# Patient Record
Sex: FEMALE | Race: WHITE | NOT HISPANIC OR LATINO | Employment: FULL TIME | URBAN - METROPOLITAN AREA
[De-identification: names, ages, dates, MRNs, and addresses within clinical notes are randomized per-mention and may not be internally consistent; named-entity substitution may affect disease eponyms.]

---

## 2017-12-13 ENCOUNTER — OFFICE VISIT (OUTPATIENT)
Dept: FAMILY MEDICINE | Facility: CLINIC | Age: 52
End: 2017-12-13
Payer: COMMERCIAL

## 2017-12-13 ENCOUNTER — TELEPHONE (OUTPATIENT)
Dept: FAMILY MEDICINE | Facility: CLINIC | Age: 52
End: 2017-12-13

## 2017-12-13 VITALS
DIASTOLIC BLOOD PRESSURE: 70 MMHG | BODY MASS INDEX: 28.28 KG/M2 | HEART RATE: 93 BPM | HEIGHT: 63 IN | TEMPERATURE: 98 F | OXYGEN SATURATION: 97 % | WEIGHT: 159.6 LBS | SYSTOLIC BLOOD PRESSURE: 120 MMHG

## 2017-12-13 DIAGNOSIS — Z12.11 SCREEN FOR COLON CANCER: ICD-10-CM

## 2017-12-13 DIAGNOSIS — L29.9 ITCHING: ICD-10-CM

## 2017-12-13 DIAGNOSIS — Z23 NEED FOR PROPHYLACTIC VACCINATION AND INOCULATION AGAINST INFLUENZA: ICD-10-CM

## 2017-12-13 DIAGNOSIS — Z00.00 ROUTINE GENERAL MEDICAL EXAMINATION AT A HEALTH CARE FACILITY: Primary | ICD-10-CM

## 2017-12-13 DIAGNOSIS — I10 HYPERTENSION GOAL BP (BLOOD PRESSURE) < 140/90: ICD-10-CM

## 2017-12-13 LAB
ANION GAP SERPL CALCULATED.3IONS-SCNC: 8 MMOL/L (ref 3–14)
BUN SERPL-MCNC: 13 MG/DL (ref 7–30)
CALCIUM SERPL-MCNC: 9.2 MG/DL (ref 8.5–10.1)
CHLORIDE SERPL-SCNC: 106 MMOL/L (ref 94–109)
CHOLEST SERPL-MCNC: 173 MG/DL
CO2 SERPL-SCNC: 26 MMOL/L (ref 20–32)
CREAT SERPL-MCNC: 0.92 MG/DL (ref 0.52–1.04)
GFR SERPL CREATININE-BSD FRML MDRD: 63 ML/MIN/1.7M2
GLUCOSE SERPL-MCNC: 90 MG/DL (ref 70–99)
HDLC SERPL-MCNC: 63 MG/DL
LDLC SERPL CALC-MCNC: 95 MG/DL
NONHDLC SERPL-MCNC: 110 MG/DL
POTASSIUM SERPL-SCNC: 3.9 MMOL/L (ref 3.4–5.3)
SODIUM SERPL-SCNC: 140 MMOL/L (ref 133–144)
TRIGL SERPL-MCNC: 74 MG/DL

## 2017-12-13 PROCEDURE — 36415 COLL VENOUS BLD VENIPUNCTURE: CPT | Performed by: FAMILY MEDICINE

## 2017-12-13 PROCEDURE — 90471 IMMUNIZATION ADMIN: CPT | Performed by: FAMILY MEDICINE

## 2017-12-13 PROCEDURE — 80048 BASIC METABOLIC PNL TOTAL CA: CPT | Performed by: FAMILY MEDICINE

## 2017-12-13 PROCEDURE — 90686 IIV4 VACC NO PRSV 0.5 ML IM: CPT | Performed by: FAMILY MEDICINE

## 2017-12-13 PROCEDURE — 99396 PREV VISIT EST AGE 40-64: CPT | Mod: 25 | Performed by: FAMILY MEDICINE

## 2017-12-13 PROCEDURE — 80061 LIPID PANEL: CPT | Performed by: FAMILY MEDICINE

## 2017-12-13 RX ORDER — LOSARTAN POTASSIUM 100 MG/1
100 TABLET ORAL DAILY
Qty: 90 TABLET | Refills: 3 | Status: SHIPPED | OUTPATIENT
Start: 2017-12-13 | End: 2018-12-05

## 2017-12-13 RX ORDER — TRIAMCINOLONE ACETONIDE 1 MG/G
CREAM TOPICAL 2 TIMES DAILY
COMMUNITY
End: 2017-12-13

## 2017-12-13 RX ORDER — TRIAMCINOLONE ACETONIDE 1 MG/G
CREAM TOPICAL 2 TIMES DAILY
Qty: 30 G | Refills: 1 | Status: SHIPPED | OUTPATIENT
Start: 2017-12-13 | End: 2018-12-17

## 2017-12-13 NOTE — NURSING NOTE
"Chief Complaint   Patient presents with     Physical     Currently, fasting        Initial BP (!) 140/92 (BP Location: Left arm, Patient Position: Chair, Cuff Size: Adult Regular)  Pulse 93  Temp 98  F (36.7  C) (Tympanic)  Ht 5' 3\" (1.6 m)  Wt 159 lb 9.6 oz (72.4 kg)  SpO2 97%  Breastfeeding? No  BMI 28.27 kg/m2 Estimated body mass index is 28.27 kg/(m^2) as calculated from the following:    Height as of this encounter: 5' 3\" (1.6 m).    Weight as of this encounter: 159 lb 9.6 oz (72.4 kg).  Medication Reconciliation: complete     Rochelle Cam MA     "

## 2017-12-13 NOTE — TELEPHONE ENCOUNTER
[employer] Screening form faxed to [phone]  Original /confirmation mailed to patient  Copy sent to stat abstracting  Althea Rosas TC

## 2017-12-13 NOTE — MR AVS SNAPSHOT
After Visit Summary   12/13/2017    Kristen Bernstein    MRN: [id number]           Patient Information     Date Of Birth          1965        Visit Information        Provider Department      12/13/2017 9:00 AM Evelin Soto MD [facility]        Today's Diagnoses     Routine general medical examination at a health care facility    -  1    Screen for colon cancer        Need for prophylactic vaccination and inoculation against influenza        Hypertension goal BP (blood pressure) < 140/90        Itching          Care Instructions      Preventive Health Recommendations  Female Ages 50 - 64    Yearly exam: See your health care provider every year in order to  o Review health changes.   o Discuss preventive care.    o Review your medicines if your doctor has prescribed any.      Get a Pap test every three years (unless you have an abnormal result and your provider advises testing more often).    If you get Pap tests with HPV test, you only need to test every 5 years, unless you have an abnormal result.     You do not need a Pap test if your uterus was removed (hysterectomy) and you have not had cancer.    You should be tested each year for STDs (sexually transmitted diseases) if you're at risk.     Have a mammogram every 1 to 2 years.    Have a colonoscopy at age 50, or have a yearly FIT test (stool test). These exams screen for colon cancer.      Have a cholesterol test every 5 years, or more often if advised.    Have a diabetes test (fasting glucose) every three years. If you are at risk for diabetes, you should have this test more often.     If you are at risk for osteoporosis (brittle bone disease), think about having a bone density scan (DEXA).    Shots: Get a flu shot each year. Get a tetanus shot every 10 years.    Nutrition:     Eat at least 5 servings of fruits and vegetables each day.    Eat whole-grain bread, whole-wheat pasta and brown rice instead of white grains and  rice.    Talk to your provider about Calcium and Vitamin D.     Lifestyle    Exercise at least 150 minutes a week (30 minutes a day, 5 days a week). This will help you control your weight and prevent disease.    Limit alcohol to one drink per day.    No smoking.     Wear sunscreen to prevent skin cancer.     See your dentist every six months for an exam and cleaning.    See your eye doctor every 1 to 2 years.            Follow-ups after your visit        Additional Services     GASTROENTEROLOGY ADULT REF PROCEDURE ONLY       Last Lab Result: Creatinine (mg/dL)       Date                     Value                 11/22/2016               0.95             ----------  Body mass index is 28.27 kg/(m^2).      Patient will be contacted to schedule procedure.     Please be aware that coverage of these services is subject to the terms and limitations of your health insurance plan.  Call member services at your health plan with any benefit or coverage questions.  Any procedures must be performed at a [facility] facility OR coordinated by your clinic's referral office.    Please bring the following with you to your appointment:    (1) Any X-Rays, CTs or MRIs which have been performed.  Contact the facility where they were done to arrange for  prior to your scheduled appointment.    (2) List of current medications   (3) This referral request   (4) Any documents/labs given to you for this referral                  Follow-up notes from your care team     Return in about 1 year (around 12/13/2018) for Annual Physical Exam.      Who to contact     If you have questions or need follow up information about today's clinic visit or your schedule please contact [facility] [place] directly at [phone].  Normal or non-critical lab and imaging results will be communicated to you by MyChart, letter or phone within 4 business days after the clinic has received the results. If you do not hear from us within 7 days, please  "contact the clinic through [email or web address] or phone. If you have a critical or abnormal lab result, we will notify you by phone as soon as possible.  Submit refill requests through [email or web address] or call your pharmacy and they will forward the refill request to us. Please allow 3 business days for your refill to be completed.          Additional Information About Your Visit        [employer] Information     [email or web address] gives you secure access to your electronic health record. If you see a primary care provider, you can also send messages to your care team and make appointments. If you have questions, please call your primary care clinic.  If you do not have a primary care provider, please call [phone] and they will assist you.        Care EveryWhere ID     This is your Care EveryWhere ID. This could be used by other organizations to access your [place] medical records  [id number]        Your Vitals Were     Pulse Temperature Height Pulse Oximetry Breastfeeding? BMI (Body Mass Index)    93 98  F (36.7  C) (Tympanic) 5' 3\" (1.6 m) 97% No 28.27 kg/m2       Blood Pressure from Last 3 Encounters:   12/13/17 120/70   11/22/16 136/86   08/25/16 130/87    Weight from Last 3 Encounters:   12/13/17 159 lb 9.6 oz (72.4 kg)   11/22/16 155 lb (70.3 kg)   08/25/16 158 lb (71.7 kg)              We Performed the Following     Basic metabolic panel     FLU VAC, SPLIT VIRUS IM > 3 YO (QUADRIVALENT) [75934]     GASTROENTEROLOGY ADULT REF PROCEDURE ONLY     Lipid Profile     Vaccine Administration, Initial [31761]          Where to get your medicines      These medications were sent to [id number] - [place] - [address]  [address] [address]     Phone:  [phone]     losartan 100 MG tablet    triamcinolone 0.1 % cream          Primary Care Provider Office Phone # Fax #    Evelin Soto [phone]       [address]  [place] [address]        Equal Access to Services     " MAO MARX : Hadii aad ku codi Dalal, waaxda luqadaha, qaybta kaalmada abimbola, vika jeramie parmjitkarey keyesendermarcelino shaw . So [facility] [phone].    ATENCIÓN: Si habla español, tiene a tyson disposición servicios gratuitos de asistencia lingüística. Llame al [phone].    We comply with applicable federal civil rights laws and [place] laws. We do not discriminate on the basis of race, color, national origin, age, disability, sex, sexual orientation, or gender identity.            Thank you!     Thank you for choosing [facility] [place]  for your care. Our goal is always to provide you with excellent care. Hearing back from our patients is one way we can continue to improve our services. Please take a few minutes to complete the written survey that you may receive in the mail after your visit with us. Thank you!             Your Updated Medication List - Protect others around you: Learn how to safely use, store and throw away your medicines at www.disposemymeds.org.          This list is accurate as of: 12/13/17  9:39 AM.  Always use your most recent med list.                   Brand Name Dispense Instructions for use Diagnosis    losartan 100 MG tablet    COZAAR    90 tablet    Take 1 tablet (100 mg) by mouth daily    Hypertension goal BP (blood pressure) < 140/90       nystatin 411100 UNIT/GM Powd    MYCOSTATIN    60 g    Apply topically 2 times daily as needed (itching)    Candidal skin infection       omeprazole 20 MG CR capsule    priLOSEC    90 capsule    Take 1 capsule by mouth daily.    GERD (gastroesophageal reflux disease)       triamcinolone 0.1 % cream    KENALOG    30 g    Apply topically 2 times daily    Itching

## 2017-12-13 NOTE — PROGRESS NOTES
SUBJECTIVE:   CC: Kristen Bernstein is an 52 year old woman who presents for preventive health visit.     Healthy Habits:    Do you get at least three servings of calcium containing foods daily (dairy, green leafy vegetables, etc.)? yes    Amount of exercise or daily activities, outside of work: 0 hour(s) per day    Problems taking medications regularly No    Medication side effects: No    Have you had an eye exam in the past two years? Unsure     Do you see a dentist twice per year? yes  Do you have sleep apnea, excessive snoring or daytime drowsiness?no      Hypertension Follow-up      Outpatient blood pressures are not being checked.    Low Salt Diet: no added salt          Today's PHQ-2 Score:   PHQ-2 ( 1999 Pfizer) 11/22/2016 11/19/2016   Q1: Little interest or pleasure in doing things 0 -   Q2: Feeling down, depressed or hopeless 0 -   PHQ-2 Score 0 -   Q1: Little interest or pleasure in doing things - Not at all   Q2: Feeling down, depressed or hopeless - Not at all   PHQ-2 Score - 0         Abuse: Current or Past(Physical, Sexual or Emotional)- No  Do you feel safe in your environment - Yes  Social History   Substance Use Topics     Smoking status: Never Smoker     Smokeless tobacco: Never Used     Alcohol use 0.0 oz/week     0 Standard drinks or equivalent per week      Comment: occ.      The patient does not drink >3 drinks per day nor >7 drinks per week.    Reviewed orders with patient.  Reviewed health maintenance and updated orders accordingly - Yes  Labs reviewed in EPIC  BP Readings from Last 3 Encounters:   12/13/17 120/70   11/22/16 136/86   08/25/16 130/87    Wt Readings from Last 3 Encounters:   12/13/17 159 lb 9.6 oz (72.4 kg)   11/22/16 155 lb (70.3 kg)   08/25/16 158 lb (71.7 kg)                  Patient Active Problem List   Diagnosis     Hypertension goal BP (blood pressure) < 140/90     Family history of colon cancer     GERD (gastroesophageal reflux disease)     CARDIOVASCULAR SCREENING;  LDL GOAL LESS THAN 130     Past Surgical History:   Procedure Laterality Date     C C-SEC ONLY,PREV C-SEC       COLONOSCOPY  10/12/2012    Procedure: COLONOSCOPY;  Colonoscopy, screening;  Surgeon: Kristen Alexander MD;  Location: [facility] OR      COLONOSCOPY THRU STOMA, DIAGNOSTIC  2007    polyps removed.      UPPER GI ENDOSCOPY  3/2006    [facility]       Social History   Substance Use Topics     Smoking status: Never Smoker     Smokeless tobacco: Never Used     Alcohol use 0.0 oz/week     0 Standard drinks or equivalent per week      Comment: occ.      Family History   Problem Relation Age of Onset     DIABETES Father      Hypertension Father      Colon Cancer Father      Hypertension Maternal Grandmother      Heart Failure Maternal Grandmother      DIABETES Maternal Uncle      Lung Cancer Maternal Uncle          Current Outpatient Prescriptions   Medication Sig Dispense Refill     losartan (COZAAR) 100 MG tablet Take 1 tablet (100 mg) by mouth daily 90 tablet 3     triamcinolone (KENALOG) 0.1 % cream Apply topically 2 times daily 30 g 1     nystatin (MYCOSTATIN) 326297 UNIT/GM POWD Apply topically 2 times daily as needed (itching) 60 g 1     omeprazole (PRILOSEC) 20 MG capsule Take 1 capsule by mouth daily. 90 capsule 3     [DISCONTINUED] losartan (COZAAR) 100 MG tablet Take 1 tablet (100 mg) by mouth daily 90 tablet 3     No Known Allergies        Mammogram : Will schedule a mammogram for next year     Colonoscopy : Will schedule     Pertinent mammograms are reviewed under the imaging tab.  History of abnormal Pap smear: No    Reviewed and updated as needed this visit by clinical staff  Tobacco  Allergies  Meds  Med Hx  Surg Hx  Fam Hx  Soc Hx        Reviewed and updated as needed this visit by Provider  Allergies              ROS:  C: NEGATIVE for fever, chills, change in weight  I: NEGATIVE for worrisome rashes, moles or lesions  E: NEGATIVE for vision changes or irritation  ENT: NEGATIVE for ear,  "mouth and throat problems  R: NEGATIVE for significant cough or SOB  B: NEGATIVE for masses, tenderness or discharge  CV: NEGATIVE for chest pain, palpitations or peripheral edema  GI: NEGATIVE for nausea, abdominal pain, heartburn, or change in bowel habits  : NEGATIVE for unusual urinary or vaginal symptoms. Periods are regular.  M: NEGATIVE for significant arthralgias or myalgia  N: NEGATIVE for weakness, dizziness or paresthesias  P: NEGATIVE for changes in mood or affect    OBJECTIVE:   /70 (BP Location: Left arm, Patient Position: Chair, Cuff Size: Adult Regular)  Pulse 93  Temp 98  F (36.7  C) (Tympanic)  Ht 5' 3\" (1.6 m)  Wt 159 lb 9.6 oz (72.4 kg)  SpO2 97%  Breastfeeding? No  BMI 28.27 kg/m2  EXAM:  GENERAL: healthy, alert and no distress  EYES: Eyes grossly normal to inspection, PERRL and conjunctivae and sclerae normal  HENT: ear canals and TM's normal, nose and mouth without ulcers or lesions  NECK: no adenopathy, no asymmetry, masses, or scars and thyroid normal to palpation  RESP: lungs clear to auscultation - no rales, rhonchi or wheezes  BREAST: normal without masses, tenderness or nipple discharge and no palpable axillary masses or adenopathy  CV: regular rate and rhythm, normal S1 S2, no S3 or S4, no murmur, click or rub, no peripheral edema and peripheral pulses strong  ABDOMEN: soft, nontender, no hepatosplenomegaly, no masses and bowel sounds normal  MS: no gross musculoskeletal defects noted, no edema  SKIN: no suspicious lesions or rashes  NEURO: Normal strength and tone, mentation intact and speech normal  PSYCH: mentation appears normal, affect normal/bright    ASSESSMENT/PLAN:   1. Routine general medical examination at a health care facility  Screening labs ordered.  - Lipid Profile    2. Hypertension goal BP (blood pressure) < 140/90  Well controlled. Resume Cozaar.  - losartan (COZAAR) 100 MG tablet; Take 1 tablet (100 mg) by mouth daily  Dispense: 90 tablet; Refill: 3  - " "Basic metabolic panel    3. Itching  No active lesions noted. Refill on Kenalog given for itch on the neck.  - triamcinolone (KENALOG) 0.1 % cream; Apply topically 2 times daily  Dispense: 30 g; Refill: 1    4. Screen for colon cancer  Family history of colon cancer in father. Colonoscopy ordered  - GASTROENTEROLOGY ADULT REF PROCEDURE ONLY    5. Need for prophylactic vaccination and inoculation against influenza      - FLU VAC, SPLIT VIRUS IM > 3 YO (QUADRIVALENT) [18526]  - Vaccine Administration, Initial [30845]    COUNSELING:   Reviewed preventive health counseling, as reflected in patient instructions       Regular exercise       Healthy diet/nutrition         reports that she has never smoked. She has never used smokeless tobacco.    Estimated body mass index is 28.27 kg/(m^2) as calculated from the following:    Height as of this encounter: 5' 3\" (1.6 m).    Weight as of this encounter: 159 lb 9.6 oz (72.4 kg).   Weight management plan: Discussed healthy diet and exercise guidelines and patient will follow up in 12 months in clinic to re-evaluate.    Counseling Resources:  ATP IV Guidelines  Pooled Cohorts Equation Calculator  Breast Cancer Risk Calculator  FRAX Risk Assessment  ICSI Preventive Guidelines  Dietary Guidelines for Americans, 2010  USDA's MyPlate  ASA Prophylaxis  Lung CA Screening    Evelin Soto MD  [facility]  Injectable Influenza Immunization Documentation    1.  Is the person to be vaccinated sick today?   No    2. Does the person to be vaccinated have an allergy to a component   of the vaccine?   No  Egg Allergy Algorithm Link    3. Has the person to be vaccinated ever had a serious reaction   to influenza vaccine in the past?   No    4. Has the person to be vaccinated ever had Guillain-Barré syndrome?   No    Form completed by       Rochelle Cam MA            "

## 2017-12-17 ENCOUNTER — HEALTH MAINTENANCE LETTER (OUTPATIENT)
Age: 52
End: 2017-12-17

## 2018-01-22 ENCOUNTER — HOSPITAL ENCOUNTER (OUTPATIENT)
Facility: CLINIC | Age: 53
Discharge: HOME | End: 2018-01-22
Attending: COLON & RECTAL SURGERY | Admitting: COLON & RECTAL SURGERY
Payer: COMMERCIAL

## 2018-01-22 ENCOUNTER — SURGERY (OUTPATIENT)
Age: 53
End: 2018-01-22

## 2018-01-22 VITALS
OXYGEN SATURATION: 99 % | SYSTOLIC BLOOD PRESSURE: 123 MMHG | RESPIRATION RATE: 16 BRPM | DIASTOLIC BLOOD PRESSURE: 87 MMHG | HEIGHT: 63 IN | BODY MASS INDEX: 27.82 KG/M2 | WEIGHT: 157 LBS

## 2018-01-22 LAB — COLONOSCOPY: NORMAL

## 2018-01-22 PROCEDURE — G0500 MOD SEDAT ENDO SERVICE >5YRS: HCPCS | Performed by: COLON & RECTAL SURGERY

## 2018-01-22 PROCEDURE — 88305 TISSUE EXAM BY PATHOLOGIST: CPT | Mod: 26 | Performed by: COLON & RECTAL SURGERY

## 2018-01-22 PROCEDURE — 45385 COLONOSCOPY W/LESION REMOVAL: CPT | Performed by: COLON & RECTAL SURGERY

## 2018-01-22 PROCEDURE — 88305 TISSUE EXAM BY PATHOLOGIST: CPT | Performed by: COLON & RECTAL SURGERY

## 2018-01-22 PROCEDURE — 25000128 H RX IP 250 OP 636: Performed by: COLON & RECTAL SURGERY

## 2018-01-22 PROCEDURE — 45380 COLONOSCOPY AND BIOPSY: CPT | Performed by: COLON & RECTAL SURGERY

## 2018-01-22 RX ORDER — LIDOCAINE 40 MG/G
CREAM TOPICAL
Status: DISCONTINUED | OUTPATIENT
Start: 2018-01-22 | End: 2018-01-22 | Stop reason: HOSPADM

## 2018-01-22 RX ORDER — ONDANSETRON 2 MG/ML
4 INJECTION INTRAMUSCULAR; INTRAVENOUS
Status: DISCONTINUED | OUTPATIENT
Start: 2018-01-22 | End: 2018-01-22 | Stop reason: HOSPADM

## 2018-01-22 RX ORDER — FENTANYL CITRATE 50 UG/ML
INJECTION, SOLUTION INTRAMUSCULAR; INTRAVENOUS PRN
Status: DISCONTINUED | OUTPATIENT
Start: 2018-01-22 | End: 2018-01-22 | Stop reason: HOSPADM

## 2018-01-22 RX ADMIN — FENTANYL CITRATE 100 MCG: 50 INJECTION, SOLUTION INTRAMUSCULAR; INTRAVENOUS at 09:22

## 2018-01-22 RX ADMIN — MIDAZOLAM 1 MG: 1 INJECTION INTRAMUSCULAR; INTRAVENOUS at 09:32

## 2018-01-22 RX ADMIN — MIDAZOLAM 2 MG: 1 INJECTION INTRAMUSCULAR; INTRAVENOUS at 09:22

## 2018-01-22 NOTE — H&P
Colon & Rectal Surgery History and Physical  Pre-Endoscopy Procedure Note    History of Present Illness   I have been asked by Dr. Soto to evaluate this 52 year old female for colorectal cancer screening. She has a family history of colon cancer, diagnosed in her father and a personal history of colon polyps.  She had a normal colonoscopy in  and currently denies any abdominal pain, weight loss, bleeding per rectum, or recent change in bowel habits.    Past Medical History  Diagnosis Date     GERD      Rash and other nonspecific skin eruption (aka PRURITIS)      Unspecified essential hypertension        Past Surgical History  Procedure Laterality Date             Medications  Medication Sig     losartan (COZAAR) 100 MG tablet Take 1 tablet (100 mg) by mouth daily     omeprazole (PRILOSEC) 20 MG capsule Take 1 capsule by mouth daily.     triamcinolone (KENALOG) 0.1 % cream Apply topically 2 times daily     nystatin (MYCOSTATIN) 098574 UNIT/GM POWD Apply topically 2 times daily as needed (itching)       Allergies   No Known Allergies     Family History   Family history includes Colon Cancer in her father; DIABETES in her father and maternal uncle; Heart Failure in her maternal grandmother; Hypertension in her father and maternal grandmother; Lung Cancer in her maternal uncle.     Social History    She reports that she has never smoked. She has never used smokeless tobacco. She reports that she drinks alcohol. She reports that she does not use illicit drugs.    Review of Systems   Constitutional:  No fever, weight change or fatigue.    Eyes:     No dry eyes or vision changes.   Ears/Nose/Throat/Neck:  No oral ulcers, sore throat or voice change.    Cardiovascular:   No palpitations, syncope, angina or edema.   Respiratory:    No chest pain, excessive sleepiness, shortness of breath or hemoptysis.    Gastrointestinal:   No abdominal pain, nausea, vomiting, diarrhea or heartburn.    Genitourinary:   No  "dysuria, hematuria, urinary retention or urinary frequency.   Musculoskeletal:  No joint swelling or arthralgias.    Dermatologic:  No skin rash or other skin changes.   Neurologic:    No focal weakness or numbness. No neuropathy.   Psychiatric:    No depression, anxiety, suicidal ideation, or paranoid ideation.   Endocrine:   No cold or heat intolerance, polydipsia, hirsutism, change in libido, or flushing.   Hematology/Lymphatic:  No bleeding or lymphadenopathy.    Allergy/Immunology:  No rhinitis or hives.     Physical Exam   Vitals:  BP (!) 132/97, RR 16, HR 64 height 1.6 m (5' 3\"), weight 71.2 kg (157 lb), SpO2 100 %, not currently breastfeeding.    General:  Alert and oriented to person, place and time   Airway: Normal oropharyngeal airway and neck mobility   Lungs:  Clear bilaterally   Heart:  Regular rate and rhythm   Abdomen: Soft, NT, ND, no masses   Rectal:  Perianal skin without excoriation, hemorrhoidal disease or anal fissure        Digital rectal examination reveals normal sphincter tone without masses    ASA Grade: II (mild systemic disease)    Impression: Cleared for use of conscious sedation for colorectal cancer screening    Plan: Proceed with colonoscopy     Priscilla You MD  [facility]  [phone]  "

## 2018-01-23 LAB — COPATH REPORT: NORMAL

## 2018-05-02 DIAGNOSIS — Z12.31 ENCOUNTER FOR SCREENING MAMMOGRAM FOR BREAST CANCER: ICD-10-CM

## 2018-05-02 PROCEDURE — 77067 SCR MAMMO BI INCL CAD: CPT | Mod: TC

## 2018-12-05 DIAGNOSIS — I10 HYPERTENSION GOAL BP (BLOOD PRESSURE) < 140/90: ICD-10-CM

## 2018-12-05 RX ORDER — LOSARTAN POTASSIUM 100 MG/1
TABLET ORAL
Qty: 30 TABLET | Refills: 0 | Status: SHIPPED | OUTPATIENT
Start: 2018-12-05 | End: 2018-12-17

## 2018-12-05 NOTE — TELEPHONE ENCOUNTER
Medication is being filled for 1 time refill only due to:  Patient needs to be seen because it has been more than one year since last visit. Routing to team to inform and assist in scheduling.   Freda Sanchez RN   [facility] - Triage

## 2018-12-05 NOTE — TELEPHONE ENCOUNTER
"Requested Prescriptions   Pending Prescriptions Disp Refills     losartan (COZAAR) 100 MG tablet [Pharmacy Med Name: Losartan Potassium Oral Tablet 100 MG]  Last Written Prescription Date:  12/13/17  Last Fill Quantity: 90,  # refills: 3   Last office visit: 12/13/2017 with prescribing provider:  Charles   Future Office Visit:     30 tablet 2     Sig: Take 1 tablet (100 mg) by mouth daily    Angiotensin-II Receptors Passed    12/5/2018  7:01 AM       Passed - Blood pressure under 140/90 in past 12 months    BP Readings from Last 3 Encounters:   01/22/18 123/87   12/13/17 120/70   11/22/16 136/86                Passed - Recent (12 mo) or future (30 days) visit within the authorizing provider's specialty    Patient had office visit in the last 12 months or has a visit in the next 30 days with authorizing provider or within the authorizing provider's specialty.  See \"Patient Info\" tab in inbasket, or \"Choose Columns\" in Meds & Orders section of the refill encounter.             Passed - Patient is age 18 or older       Passed - No active pregnancy on record       Passed - Normal serum creatinine on file in past 12 months    Recent Labs   Lab Test  12/13/17   0939   CR  0.92            Passed - Normal serum potassium on file in past 12 months    Recent Labs   Lab Test  12/13/17   0939   POTASSIUM  3.9                   Passed - No positive pregnancy test in past 12 months          "

## 2018-12-17 ENCOUNTER — OFFICE VISIT (OUTPATIENT)
Dept: FAMILY MEDICINE | Facility: CLINIC | Age: 53
End: 2018-12-17
Payer: COMMERCIAL

## 2018-12-17 VITALS
DIASTOLIC BLOOD PRESSURE: 80 MMHG | TEMPERATURE: 97.9 F | SYSTOLIC BLOOD PRESSURE: 136 MMHG | HEIGHT: 62 IN | BODY MASS INDEX: 30 KG/M2 | HEART RATE: 75 BPM | WEIGHT: 163 LBS | OXYGEN SATURATION: 99 %

## 2018-12-17 DIAGNOSIS — I10 HYPERTENSION GOAL BP (BLOOD PRESSURE) < 140/90: ICD-10-CM

## 2018-12-17 DIAGNOSIS — Z00.00 ANNUAL PHYSICAL EXAM: Primary | ICD-10-CM

## 2018-12-17 DIAGNOSIS — L29.9 ITCHING: ICD-10-CM

## 2018-12-17 LAB
ALBUMIN SERPL-MCNC: 3.8 G/DL (ref 3.4–5)
ALP SERPL-CCNC: 97 U/L (ref 40–150)
ALT SERPL W P-5'-P-CCNC: 30 U/L (ref 0–50)
ANION GAP SERPL CALCULATED.3IONS-SCNC: 5 MMOL/L (ref 3–14)
AST SERPL W P-5'-P-CCNC: 25 U/L (ref 0–45)
BILIRUB SERPL-MCNC: 0.4 MG/DL (ref 0.2–1.3)
BUN SERPL-MCNC: 14 MG/DL (ref 7–30)
CALCIUM SERPL-MCNC: 9.4 MG/DL (ref 8.5–10.1)
CHLORIDE SERPL-SCNC: 107 MMOL/L (ref 94–109)
CHOLEST SERPL-MCNC: 168 MG/DL
CO2 SERPL-SCNC: 26 MMOL/L (ref 20–32)
CREAT SERPL-MCNC: 1.05 MG/DL (ref 0.52–1.04)
GFR SERPL CREATININE-BSD FRML MDRD: 55 ML/MIN/1.7M2
GLUCOSE SERPL-MCNC: 97 MG/DL (ref 70–99)
HDLC SERPL-MCNC: 51 MG/DL
HGB BLD-MCNC: 13.2 G/DL (ref 11.7–15.7)
HIV 1+2 AB+HIV1 P24 AG SERPL QL IA: NONREACTIVE
LDLC SERPL CALC-MCNC: 101 MG/DL
NONHDLC SERPL-MCNC: 117 MG/DL
POTASSIUM SERPL-SCNC: 4.3 MMOL/L (ref 3.4–5.3)
PROT SERPL-MCNC: 7.2 G/DL (ref 6.8–8.8)
SODIUM SERPL-SCNC: 138 MMOL/L (ref 133–144)
TRIGL SERPL-MCNC: 82 MG/DL

## 2018-12-17 PROCEDURE — 99396 PREV VISIT EST AGE 40-64: CPT | Performed by: FAMILY MEDICINE

## 2018-12-17 PROCEDURE — 80061 LIPID PANEL: CPT | Performed by: FAMILY MEDICINE

## 2018-12-17 PROCEDURE — 36415 COLL VENOUS BLD VENIPUNCTURE: CPT | Performed by: FAMILY MEDICINE

## 2018-12-17 PROCEDURE — 80053 COMPREHEN METABOLIC PANEL: CPT | Performed by: FAMILY MEDICINE

## 2018-12-17 PROCEDURE — 85018 HEMOGLOBIN: CPT | Performed by: FAMILY MEDICINE

## 2018-12-17 PROCEDURE — 87389 HIV-1 AG W/HIV-1&-2 AB AG IA: CPT | Performed by: FAMILY MEDICINE

## 2018-12-17 PROCEDURE — 99213 OFFICE O/P EST LOW 20 MIN: CPT | Mod: 25 | Performed by: FAMILY MEDICINE

## 2018-12-17 RX ORDER — TRIAMCINOLONE ACETONIDE 1 MG/G
CREAM TOPICAL 2 TIMES DAILY
Qty: 30 G | Refills: 1 | Status: SHIPPED | OUTPATIENT
Start: 2018-12-17 | End: 2019-05-24

## 2018-12-17 RX ORDER — LOSARTAN POTASSIUM 100 MG/1
TABLET ORAL
Qty: 90 TABLET | Refills: 3 | Status: SHIPPED | OUTPATIENT
Start: 2018-12-17 | End: 2020-01-03

## 2018-12-17 ASSESSMENT — MIFFLIN-ST. JEOR: SCORE: 1299.61

## 2018-12-17 NOTE — PROGRESS NOTES
SUBJECTIVE:   CC: Kristen Bernstein is an 53 year old woman who presents for preventive health visit.     Physical   Annual:     Getting at least 3 servings of Calcium per day:  NO    Bi-annual eye exam:  Yes    Dental care twice a year:  Yes    Sleep apnea or symptoms of sleep apnea:  None    Diet:  Regular (no restrictions)    Frequency of exercise:  None    Taking medications regularly:  Yes    Medication side effects:  None    Additional concerns today:  Yes    PHQ-2 Total Score: 0          Hypertension Follow-up      Outpatient blood pressures are not being checked.    Low Salt Diet: no added salt      Complain of itching at the nape of neck, every morning when she wakes up.  Scalp otherwise does not itch.    Today's PHQ-2 Score:   PHQ-2 ( 1999 Pf[employer]) 12/16/2018   Q1: Little interest or pleasure in doing things 0   Q2: Feeling down, depressed or hopeless 0   PHQ-2 Score 0   Q1: Little interest or pleasure in doing things Not at all   Q2: Feeling down, depressed or hopeless Not at all   PHQ-2 Score 0       Abuse: Current or Past(Physical, Sexual or Emotional)- No  Do you feel safe in your environment? Yes    Social History     Tobacco Use     Smoking status: Never Smoker     Smokeless tobacco: Never Used   Substance Use Topics     Alcohol use: Yes     Alcohol/week: 0.0 oz     Comment: 1/week     Alcohol Use 12/16/2018   If you drink alcohol do you typically have greater than 3 drinks per day OR greater than 7 drinks per week? No       Reviewed orders with patient.  Reviewed health maintenance and updated orders accordingly - Yes  Labs reviewed in EPIC  BP Readings from Last 3 Encounters:   12/17/18 136/80   01/22/18 123/87   12/13/17 120/70    Wt Readings from Last 3 Encounters:   12/17/18 73.9 kg (163 lb)   01/22/18 71.2 kg (157 lb)   12/13/17 72.4 kg (159 lb 9.6 oz)                  Patient Active Problem List   Diagnosis     Hypertension goal BP (blood pressure) < 140/90     Family history of colon cancer      GERD (gastroesophageal reflux disease)     CARDIOVASCULAR SCREENING; LDL GOAL LESS THAN 130     Past Surgical History:   Procedure Laterality Date     C C-SEC ONLY,PREV C-SEC       COLONOSCOPY  10/12/2012    Procedure: COLONOSCOPY;  Colonoscopy, screening;  Surgeon: Kristen Alexander MD;  Location: [facility] OR     COLONOSCOPY N/A 1/22/2018    Procedure: COMBINED COLONOSCOPY, SINGLE OR MULTIPLE BIOPSY/POLYPECTOMY BY BIOPSY;;  Surgeon: Priscilla You MD;  Location:  GI     [facility] COLONOSCOPY THRU STOMA, DIAGNOSTIC  2007    polyps removed.      UPPER GI ENDOSCOPY  3/2006    [facility]       Social History     Tobacco Use     Smoking status: Never Smoker     Smokeless tobacco: Never Used   Substance Use Topics     Alcohol use: Yes     Alcohol/week: 0.0 oz     Comment: 1/week     Family History   Problem Relation Age of Onset     Diabetes Father      Hypertension Father      Colon Cancer Father      Hypertension Maternal Grandmother      Heart Failure Maternal Grandmother      Diabetes Maternal Uncle      Lung Cancer Maternal Uncle          Current Outpatient Medications   Medication Sig Dispense Refill     losartan (COZAAR) 100 MG tablet Take 1 tablet (100 mg) by mouth daily 90 tablet 3     ranitidine (ZANTAC) 150 MG tablet Take 150 mg by mouth 2 times daily       triamcinolone (KENALOG) 0.1 % external cream Apply topically 2 times daily 30 g 1     No Known Allergies    Mammogram Screening: Patient over age 50, mutual decision to screen reflected in health maintenance.    Pertinent mammograms are reviewed under the imaging tab.  History of abnormal Pap smear: NO - age 30-65 PAP every 5 years with negative HPV co-testing recommended  PAP / HPV Latest Ref Rng & Units 11/12/2015 9/5/2012 9/13/2010   PAP - NIL NIL NIL   HPV 16 DNA NEG Negative - -   HPV 18 DNA NEG Negative - -   OTHER HR HPV NEG Negative - -     Reviewed and updated as needed this visit by clinical staff         Reviewed and updated as needed this  visit by Provider            Review of Systems  CONSTITUTIONAL: NEGATIVE for fever, chills, change in weight  EYES: NEGATIVE for vision changes or irritation  ENT: NEGATIVE for ear, mouth and throat problems  RESP: NEGATIVE for significant cough or SOB  BREAST: NEGATIVE for masses, tenderness or discharge  CV: NEGATIVE for chest pain, palpitations or peripheral edema  GI: NEGATIVE for nausea, abdominal pain, heartburn, or change in bowel habits  : NEGATIVE for unusual urinary or vaginal symptoms. No vaginal bleeding.  MUSCULOSKELETAL: NEGATIVE for significant arthralgias or myalgia  NEURO: NEGATIVE for weakness, dizziness or paresthesias  PSYCHIATRIC: NEGATIVE for changes in mood or affect      OBJECTIVE:   There were no vitals taken for this visit.  Physical Exam  GENERAL: healthy, alert and no distress  EYES: Eyes grossly normal to inspection, PERRL and conjunctivae and sclerae normal  HENT: ear canals and TM's normal, nose and mouth without ulcers or lesions  NECK: no adenopathy, no asymmetry, masses, or scars and thyroid normal to palpation  RESP: lungs clear to auscultation - no rales, rhonchi or wheezes  BREAST: normal without masses, tenderness or nipple discharge and no palpable axillary masses or adenopathy  CV: regular rate and rhythm, normal S1 S2, no S3 or S4, no murmur, click or rub, no peripheral edema and peripheral pulses strong  ABDOMEN: soft, nontender, no hepatosplenomegaly, no masses and bowel sounds normal  MS: no gross musculoskeletal defects noted, no edema  SKIN: Erythema noted at the nape of the neck.  A few moles noted on the leg.  NEURO: Normal strength and tone, mentation intact and speech normal  PSYCH: mentation appears normal, affect normal/bright        ASSESSMENT/PLAN:   1. Annual physical exam  Screening labs ordered.  - HIV Antigen Antibody Combo  - Hemoglobin    2. Hypertension goal BP (blood pressure) < 140/90  Well-controlled.  - losartan (COZAAR) 100 MG tablet; Take 1 tablet  "(100 mg) by mouth daily  Dispense: 90 tablet; Refill: 3  - Lipid panel reflex to direct LDL Fasting  - Comprehensive metabolic panel    3. Itching  Recommending using triamcinolone cream twice daily for a week or 2.  If symptoms are not improving, recommended to see skincare clinic.  Patient tells that she is due for annual skin check anyways.  She will plan that.  - triamcinolone (KENALOG) 0.1 % external cream; Apply topically 2 times daily  Dispense: 30 g; Refill: 1    COUNSELING:  Reviewed preventive health counseling, as reflected in patient instructions       Regular exercise       Healthy diet/nutrition    BP Readings from Last 1 Encounters:   01/22/18 123/87     Estimated body mass index is 27.81 kg/m  as calculated from the following:    Height as of 1/22/18: 1.6 m (5' 3\").    Weight as of 1/22/18: 71.2 kg (157 lb).      Weight management plan: Discussed healthy diet and exercise guidelines     reports that  has never smoked. she has never used smokeless tobacco.      Counseling Resources:  ATP IV Guidelines  Pooled Cohorts Equation Calculator  Breast Cancer Risk Calculator  FRAX Risk Assessment  ICSI Preventive Guidelines  Dietary Guidelines for Americans, 2010  USDA's MyPlate  ASA Prophylaxis  Lung CA Screening    Evelin Soto MD  [facility] ([facility] [place]  "

## 2019-01-03 ENCOUNTER — OFFICE VISIT (OUTPATIENT)
Dept: FAMILY MEDICINE | Facility: CLINIC | Age: 54
End: 2019-01-03
Payer: COMMERCIAL

## 2019-01-03 VITALS — SYSTOLIC BLOOD PRESSURE: 100 MMHG | DIASTOLIC BLOOD PRESSURE: 74 MMHG | HEART RATE: 74 BPM

## 2019-01-03 DIAGNOSIS — D22.9 MULTIPLE BENIGN MELANOCYTIC NEVI: ICD-10-CM

## 2019-01-03 DIAGNOSIS — L81.4 SOLAR LENTIGO: ICD-10-CM

## 2019-01-03 DIAGNOSIS — Z12.83 SKIN CANCER SCREENING: Primary | ICD-10-CM

## 2019-01-03 DIAGNOSIS — L30.9 ECZEMA, UNSPECIFIED TYPE: ICD-10-CM

## 2019-01-03 DIAGNOSIS — D48.5 NEOPLASM OF UNCERTAIN BEHAVIOR OF SKIN: ICD-10-CM

## 2019-01-03 PROCEDURE — 99213 OFFICE O/P EST LOW 20 MIN: CPT | Mod: 25 | Performed by: FAMILY MEDICINE

## 2019-01-03 PROCEDURE — 11301 SHAVE SKIN LESION 0.6-1.0 CM: CPT | Performed by: FAMILY MEDICINE

## 2019-01-03 PROCEDURE — 88305 TISSUE EXAM BY PATHOLOGIST: CPT | Mod: TC | Performed by: FAMILY MEDICINE

## 2019-01-03 PROCEDURE — 88342 IMHCHEM/IMCYTCHM 1ST ANTB: CPT | Mod: TC | Performed by: FAMILY MEDICINE

## 2019-01-03 PROCEDURE — 11300 SHAVE SKIN LESION 0.5 CM/<: CPT | Mod: 51 | Performed by: FAMILY MEDICINE

## 2019-01-03 RX ORDER — BETAMETHASONE DIPROPIONATE 0.5 MG/G
CREAM TOPICAL 2 TIMES DAILY
Qty: 45 G | Refills: 1 | Status: SHIPPED | OUTPATIENT
Start: 2019-01-03 | End: 2020-08-25

## 2019-01-03 NOTE — PATIENT INSTRUCTIONS
"FUTURE APPOINTMENTS  Follow up in 1 year(s) for a full-body skin cancer screening.  Follow up per pathology report.    WOUND CARE INSTRUCTIONS  1. Wash hands before every dressing change.  2. After 24 hours, change dressing daily.  3. Wash the wound area with a mild soap, then rinse.  4. Gently pat dry with a sterile gauze or Q-tip.  5. Using a Q-tip, apply Vaseline or Aquaphor only over entire wound. Do NOT use Neosporin - as many people react to neomycin.  6. Finally, cover with a bandage or sterile non-stick gauze with micropore paper tape.  7. Repeat once daily until wound has healed.      Soap, water and shampoo will not hurt this area.    Do not go swimming or take baths, but showering is encouraged.    Limit use of the area where the procedure was done for a few days to allow for optimal healing.    If you experience bleeding:  Wash hands and hold firm pressure on the area for 10 minutes without checking to see if the bleeding has stopped. \"Checking\" pulls off the protective wound clot and restarts the bleeding all over again. Re-apply pressure for 10 minutes if necessary to stop bleeding.  Use additional sterile gauze and tape to maintain pressure once bleeding has stopped.  If bleeding continues, then call back to clinic at [phone].    Signs of Infection:  Infection can occur in any area where skin has been disrupted.  If you notice persistent redness, swelling, colored drainage, increasing pain, fever or other signs of infection, please call us at: [phone] and ask to have me or my colleague paged. We will call you back to discuss.    Pathology Results:  You will be notified, generally via letter or MyChart, in approximately 10 days. If there is anything we need to discuss or further treatment needed, I will call you to discuss it.    PATIENT INFORMATION : WOUNDS  During the healing process you will notice a number of changes. All wounds develop a small halo of redness surrounding the wound.  " "This means healing is occurring. Severe itching with extensive redness usually indicates sensitivity to the ointment or bandage tape used to dress the wound.  You should call our office if this develops.      Swelling  and/or discoloration around your surgical site is common, particularly when performed around the eye.    All wounds normally drain.  The larger the wound the more drainage there will be.  After 7-10 days, you will notice the wound beginning to shrink and new skin will begin to grow.  The wound is healed when you can see skin has formed over the entire area.  A healed wound has a healthy, shiny look to the surface and is red to dark pink in color to normalize.  Wounds may take approximately 4-6 weeks to heal.  Larger wounds may take 6-8 weeks. After the wound is healed you may discontinue dressing changes.    You may experience a sensation of tightness as your wound heals. This is normal and will gradually subside.    Your healed wound may be sensitive to temperature changes. This sensitivity improves with time, but if you re having a lot of discomfort, try to avoid temperature extremes.    Patients frequently experience itching after their wound appears to have healed because of the continue healing under the skin.  Plain Vaseline will help relieve the itching.    SUN PROTECTION INSTRUCTIONS  Sun damage can lead to skin cancer and premature aging of the skin.      The best way to protect from sun damage to your skin is to avoid the sun during peak hours (10 am - 2 pm) even on overcast days.    Never use tanning beds. Tanning beds are associated with much higher risks of skin cancer.    All tanning damages the skin. Aim for ivory skin year round and you will have less trouble with your skin in years to come. There is no merit in getting \"a base tan\" before a warm weather vacation, as any tanning indicates your body's response to sun damage.    Stop smoking. Smokers have higher rates of skin cancer and " "also have premature skin wrinkling.    Use UPF sun-protective clothing, which while more expensive initially provides longer lasting coverage without having to worry about remembering to re-apply.  1. Wear a wide-brimmed hat and sunglasses.   2. Wear sun-protective clothing.  [employer] and other [employer] make sun protective clothing that are stylish, comfortable and cool.   [employer] and other [employer] make UV arm sleeves suitable for golfing, gardening and other activities.    Sunscreen instructions:  1. Use sunscreens with Zinc Oxide, Titanium Dioxide or Avobenzone to protect from UVA rays.  2. Use SPF 30-50+ to protect from UVB rays.  3. Re-apply every 2 hours even if water resistant.  4. Apply on your face every day even when cloudy and even in the winter. UVA \"aging rays\" penetrate window glass and are just as strong in the winter as in the summer.    FYI  You should use about 3 tablespoons of sunscreen to protect your whole body. Thus a typical eight ounce bottle of sunscreen should last 4 applications. Remember, that the SPF rating is compromised if you don t apply enough. Most people only apply 1/2 - 1/3 of the amount they need. Also don t forget areas such as your ears, feet, upper back and harder to reach places. Keep in mind that these amounts should be increased for larger body sizes.    Sunscreens with titanium dioxide and/or zinc oxide in the active ingredients are physical blockers as opposed to chemical blockers. Chemical-free sunscreens should not irritate the skin.    Spray-on sunscreens may be used for touch-up application only, not as a base layer. Also, use with caution around small children due to inhalation risk.    SPF means sun protection factor, which is just the degree to which the sunscreen can protect against UVB rays. There is no rating system for UVA rays. SPF is calculated as the time skin will burn when sunscreen is applied vs. skin without sunscreen.    Water " resistant sunscreens should be re-applied every 1-2 hours.    Product Recommendations:    Consider use of sunscreen sticks with Zinc Oxide and Titanium Dioxide active ingredients such as Neutrogena Pure&Free Baby Sunscreen Stick.    Good examples include: Blue Lizard, EltaMD, Solbar    Good daily moisturizers with SPF: Vanicream, CeraVe.    For sensitive skin, consider : SkinMedica Essential Defense Mineral Shield Broad Spectrum SPF 35    Men: consider use of Neutrogena Triple Protect Facial Lotion    Avoid retinyl palmitate products.  Avoid combination products that include both sunscreen and insect repellant, as sunscreen should be applied every 2 hours, but insect repellant should not be applied as frequently.    For more information:  https://www.skincancer.org/prevention/sun-protection/sunscreen/sunscreens-safe-and-effective    TOPICAL STEROID INSTRUCTIONS  Betamethasone dipropionate 0.05% cream.  Apply two times per day for 10-14 consecutive days. Then, use only when needed.  1. Wash hands before applying topical steroid.  2. Apply sparingly (just enough to rub in) onto affected areas of the neck and shoulders.  (Use the adult fingertip unit (FTU) as a guide.) Apply no more than 1 FTU per area.    3. Wash off any excess, unused topical steroid.    This higher strength steroid should never be used on face nor groin.    After the initial treatment, topical steroid may be used as needed for flare-ups but only for short-term treatment. If you are using this for prolonged periods of time to control flare-ups, return to clinic for re-evaluation of treatment.    Keep in mind to also regularly use moisturizer, as this preventative measure can help maintain your skin's natural protective moisture barrier.    DRY SKIN MANAGEMENT INSTRUCTIONS  Routine use of moisturizer is important for healthy, resilient skin not just for soft skin.     Sealing in moisture    Twice daily use of a moisturizer such as over-the-counter  "(OTC) CeraVe moisturizer cream (in the jar). CeraVe products contain ceramides and filaggrin proteins that can help to maintain the body's moisture layer.    Twice daily use of a moisturizer such as OTC Vaseline petroleum jelly or OTC Aquaphor ointment.    After cleansing or washing, always apply moisturizer immediately after drying off (pat dry only) for best effect.    Protection while hydrating    Do not overuse soap. Unless you have been sweating extensively, just apply soap to groin and armpits.    Recommended products for body include: OTC unscented Dove for sensitive skin or OTC Vanicream cleansing bar.    Recommended facial cleansers include: OTC CeraVe hydrating facial cleanser or OTC Cetaphil daily facial cleanser.    Avoid use of    Scented/perfumed products    Irritating clothing (wool, new jeans, new/unwashed clothing, scratchy synthetics)    Neosporin or triple antibiotic topical products    Products containing aloe, herbs, Vitamin E, or other \"natural ingredients\".    Dryer sheets or fabric softeners (while symptoms are present)    If a topical medication is prescribed, apply topical prescription first, followed by use of moisturizing product.  "

## 2019-01-03 NOTE — PROCEDURES
Name : Shave Excision  Indication : Excision of tissue for pathology evaluation.  Location(s) : Left medial distal thigh: 7 x 3 mm in size irregularly pigmented lesion. ? Atypical nevus ? Other.  Completed by : Dulce Hernandes MD  Photo Taken : no.  Anesthesia : Patient was anesthetized by infiltrating the area surrounding the lesion with 1% lidocaine.   epinephrine [id number] : Yes.  Note : Discussed the risk of pain, infection, scarring, hypo- or hyperpigmentation and recurrence or need for re-treatment. The benefits of treatment and alternative treatments were also discussed.    During this procedure, the universal protocol was utilized. The patient's identity was confirmed by no less than two patient identifiers, correct procedure was verified, correct site was verified and marked as applicable and a final pause was completed.    Sterile technique was used throughout the procedure. The skin was cleaned and prepped with surgical cleanser. Once adequate anesthesia was obtained, the lesion was removed with a deep scallop shave procedure. The specimen was sent to pathology.    Direct pressure and aluminum chloride and monopolar cautery was applied for hemostasis. No bleeding was present upon the completion of the procedure. The wound was coated with antibacterial ointment. A dry sterile dressing was applied. Patient tolerated the procedure well and left in satisfactory condition.    Primary provider and referring provider will be informed regarding the tissue report when it returns.    Name : Shave Excision  Indication : Excision of tissue for pathology evaluation.  Location(s) : Left leg, 17 cm distal to patella: 4 mm in size irregularly pigmented brown macule. ? Atypical nevus ? Other.  Completed by : Dulce Hernandes MD  Photo Taken : no.  Anesthesia : Patient was anesthetized by infiltrating the area surrounding the lesion with 1% lidocaine.   epinephrine [id number] : Yes.  Note : Discussed the risk of pain, infection,  scarring, hypo- or hyperpigmentation and recurrence or need for re-treatment. The benefits of treatment and alternative treatments were also discussed.    During this procedure, the universal protocol was utilized. The patient's identity was confirmed by no less than two patient identifiers, correct procedure was verified, correct site was verified and marked as applicable and a final pause was completed.    Sterile technique was used throughout the procedure. The skin was cleaned and prepped with surgical cleanser. Once adequate anesthesia was obtained, the lesion was removed with a deep scallop shave procedure. The specimen was sent to pathology.    Direct pressure and aluminum chloride and monopolar cautery was applied for hemostasis. No bleeding was present upon the completion of the procedure. The wound was coated with antibacterial ointment. A dry sterile dressing was applied. Patient tolerated the procedure well and left in satisfactory condition.    Primary provider and referring provider will be informed regarding the tissue report when it returns.

## 2019-01-03 NOTE — LETTER
1/3/2019         RE: Kristen Bernstein  [address] [place] [id number]        Dear Colleague,    Thank you for referring your patient, Kristen Bernstein, to the [facility]. Please see a copy of my visit note below.    [facility] - PRIMARY CARE SKIN    CC: skin cancer screening (full-body)  SUBJECTIVE:   Kristen Bernstein is a(n) 53 year old female who presents to clinic today for a full-body skin exam.    Bothersome lesions noticed by the patient or other skin concerns :  Issue One: She has noticed chronic but episodic itchiness at the base of the neck. It is most prominent at night, possibly with increased heat. She has used triamcinolone 0.1% cream twice daily since 12/17/18.  She uses CeraVe moisturizer cream on a daily basis. She is also using Free&Clear shampoo and conditioner.    Personal Medical History  Skin cancer: NO  Eczema Psoriasis Autoimmune   NO NO NO   Other: history of sensitive skin.    Family Medical History  Skin cancer: NO  Eczema Psoriasis Autoimmune   NO NO NO     Sun Exposure History  Previous history of significant sun exposure: YES    Occupation: (indoor).    Refer to electronic medical record (EMR) for past medical history and medications.    INTEGUMENTARY/SKIN: POSITIVE for pruritus  ROS: 14 point review of systems was negative except the symptoms listed above in the HPI.    This document serves as a record of the services and decisions personally performed and made by Latoya Hernandes MD and was created by Bora Lomeli, a trained medical scribe, based on personal observations and provider statements to the medical scribe.  January 3, 2019 11:03 AM   Bora Lomeli    OBJECTIVE:   GENERAL: healthy, alert and no distress.  SKIN: [place] Skin Type - II.  This patient was examined from the top of the head to the bottom of the feet  including scalp, face, neck, trunk, buttocks, both arms, both legs, both hands, both feet, and all fingers and toes. The  dermatoscope was used to help evaluate pigmented lesions.  Skin Pertinent Findings:  Face: Scattered brown, macule(s) most consistent with benign solar lentigo.    Upper extremities: Scattered brown, macule(s) most consistent with benign solar lentigo. Scattered brown macules of various sizes and shapes most consistent with (benign) melanocytic nevi.    Anterior trunk: scattered brown, macule(s) most consistent with benign solar lentigo. brown macules of various sizes and shapes most consistent with (benign) melanocytic nevi.    Back: brown, macule(s) most consistent with benign solar lentigo. brown macules of various sizes and shapes most consistent with (benign) melanocytic nevi.    Posterior lower extremities: Scattered brown macules of various sizes and shapes most consistent with (benign) melanocytic nevi.    Right dorsal fourth toe: 3 mm in size brown macule(s) most consistent with benign nevus(i).    Significant Findings:  Patch of maculopapular eruption on the shoulders. Another similar patch at base of occipital scalp.    Left medial distal thigh: 7 x 3 mm in size irregularly pigmented lesion. ? Atypical nevus ? Other    Left leg, 17 cm distal to patella: 4 mm in size irregularly pigmented brown macule. ? Atypical nevus ? Other    ASSESSMENT:     Encounter Diagnoses   Name Primary?     Skin cancer screening Yes     Solar lentigo      Multiple benign melanocytic nevi      Neoplasm of uncertain behavior of skin      Eczema, unspecified type          PLAN:   Patient Instructions   FUTURE APPOINTMENTS  Follow up in 1 year(s) for a full-body skin cancer screening.  Follow up per pathology report.    WOUND CARE INSTRUCTIONS  1. Wash hands before every dressing change.  2. After 24 hours, change dressing daily.  3. Wash the wound area with a mild soap, then rinse.  4. Gently pat dry with a sterile gauze or Q-tip.  5. Using a Q-tip, apply Vaseline or Aquaphor only over entire wound. Do NOT use Neosporin - as many  "people react to neomycin.  6. Finally, cover with a bandage or sterile non-stick gauze with micropore paper tape.  7. Repeat once daily until wound has healed.      Soap, water and shampoo will not hurt this area.    Do not go swimming or take baths, but showering is encouraged.    Limit use of the area where the procedure was done for a few days to allow for optimal healing.    If you experience bleeding:  Wash hands and hold firm pressure on the area for 10 minutes without checking to see if the bleeding has stopped. \"Checking\" pulls off the protective wound clot and restarts the bleeding all over again. Re-apply pressure for 10 minutes if necessary to stop bleeding.  Use additional sterile gauze and tape to maintain pressure once bleeding has stopped.  If bleeding continues, then call back to clinic at [phone].    Signs of Infection:  Infection can occur in any area where skin has been disrupted.  If you notice persistent redness, swelling, colored drainage, increasing pain, fever or other signs of infection, please call us at: [phone] and ask to have me or my colleague paged. We will call you back to discuss.    Pathology Results:  You will be notified, generally via letter or MyChart, in approximately 10 days. If there is anything we need to discuss or further treatment needed, I will call you to discuss it.    PATIENT INFORMATION : WOUNDS  During the healing process you will notice a number of changes. All wounds develop a small halo of redness surrounding the wound.  This means healing is occurring. Severe itching with extensive redness usually indicates sensitivity to the ointment or bandage tape used to dress the wound.  You should call our office if this develops.      Swelling  and/or discoloration around your surgical site is common, particularly when performed around the eye.    All wounds normally drain.  The larger the wound the more drainage there will be.  After 7-10 days, you will " "notice the wound beginning to shrink and new skin will begin to grow.  The wound is healed when you can see skin has formed over the entire area.  A healed wound has a healthy, shiny look to the surface and is red to dark pink in color to normalize.  Wounds may take approximately 4-6 weeks to heal.  Larger wounds may take 6-8 weeks. After the wound is healed you may discontinue dressing changes.    You may experience a sensation of tightness as your wound heals. This is normal and will gradually subside.    Your healed wound may be sensitive to temperature changes. This sensitivity improves with time, but if you re having a lot of discomfort, try to avoid temperature extremes.    Patients frequently experience itching after their wound appears to have healed because of the continue healing under the skin.  Plain Vaseline will help relieve the itching.    SUN PROTECTION INSTRUCTIONS  Sun damage can lead to skin cancer and premature aging of the skin.      The best way to protect from sun damage to your skin is to avoid the sun during peak hours (10 am - 2 pm) even on overcast days.    Never use tanning beds. Tanning beds are associated with much higher risks of skin cancer.    All tanning damages the skin. Aim for ivory skin year round and you will have less trouble with your skin in years to come. There is no merit in getting \"a base tan\" before a warm weather vacation, as any tanning indicates your body's response to sun damage.    Stop smoking. Smokers have higher rates of skin cancer and also have premature skin wrinkling.    Use UPF sun-protective clothing, which while more expensive initially provides longer lasting coverage without having to worry about remembering to re-apply.  1. Wear a wide-brimmed hat and sunglasses.   2. Wear sun-protective clothing.  [email or web address] and other [employer] make sun protective clothing that are stylish, comfortable and cool.   [employer] and other [employer] make UV arm " "sleeves suitable for golfing, gardening and other activities.    Sunscreen instructions:  1. Use sunscreens with Zinc Oxide, Titanium Dioxide or Avobenzone to protect from UVA rays.  2. Use SPF 30-50+ to protect from UVB rays.  3. Re-apply every 2 hours even if water resistant.  4. Apply on your face every day even when cloudy and even in the winter. UVA \"aging rays\" penetrate window glass and are just as strong in the winter as in the summer.    FYI  You should use about 3 tablespoons of sunscreen to protect your whole body. Thus a typical eight ounce bottle of sunscreen should last 4 applications. Remember, that the SPF rating is compromised if you don t apply enough. Most people only apply 1/2 - 1/3 of the amount they need. Also don t forget areas such as your ears, feet, upper back and harder to reach places. Keep in mind that these amounts should be increased for larger body sizes.    Sunscreens with titanium dioxide and/or zinc oxide in the active ingredients are physical blockers as opposed to chemical blockers. Chemical-free sunscreens should not irritate the skin.    Spray-on sunscreens may be used for touch-up application only, not as a base layer. Also, use with caution around small children due to inhalation risk.    SPF means sun protection factor, which is just the degree to which the sunscreen can protect against UVB rays. There is no rating system for UVA rays. SPF is calculated as the time skin will burn when sunscreen is applied vs. skin without sunscreen.    Water resistant sunscreens should be re-applied every 1-2 hours.    Product Recommendations:    Consider use of sunscreen sticks with Zinc Oxide and Titanium Dioxide active ingredients such as Neutrogena Pure&Free Baby Sunscreen Stick.    Good examples include: Blue Lizard, EltaMD, Solbar    Good daily moisturizers with SPF: Vanicream, CeraVe.    For sensitive skin, consider : SkinMedica Essential Defense Mineral Shield Broad Spectrum SPF " 35    Men: consider use of Neutrogena Triple Protect Facial Lotion    Avoid retinyl palmitate products.  Avoid combination products that include both sunscreen and insect repellant, as sunscreen should be applied every 2 hours, but insect repellant should not be applied as frequently.    For more information:  https://www.skincancer.org/prevention/sun-protection/sunscreen/sunscreens-safe-and-effective    TOPICAL STEROID INSTRUCTIONS  Betamethasone dipropionate 0.05% cream.  Apply two times per day for 10-14 consecutive days. Then, use only when needed.  1. Wash hands before applying topical steroid.  2. Apply sparingly (just enough to rub in) onto affected areas of the neck and shoulders.  (Use the adult fingertip unit (FTU) as a guide.) Apply no more than 1 FTU per area.    3. Wash off any excess, unused topical steroid.    This higher strength steroid should never be used on face nor groin.    After the initial treatment, topical steroid may be used as needed for flare-ups but only for short-term treatment. If you are using this for prolonged periods of time to control flare-ups, return to clinic for re-evaluation of treatment.    Keep in mind to also regularly use moisturizer, as this preventative measure can help maintain your skin's natural protective moisture barrier.    DRY SKIN MANAGEMENT INSTRUCTIONS  Routine use of moisturizer is important for healthy, resilient skin not just for soft skin.     Sealing in moisture    Twice daily use of a moisturizer such as over-the-counter (OTC) CeraVe moisturizer cream (in the jar). CeraVe products contain ceramides and filaggrin proteins that can help to maintain the body's moisture layer.    Twice daily use of a moisturizer such as OTC Vaseline petroleum jelly or OTC Aquaphor ointment.    After cleansing or washing, always apply moisturizer immediately after drying off (pat dry only) for best effect.    Protection while hydrating    Do not overuse soap. Unless you have  "been sweating extensively, just apply soap to groin and armpits.    Recommended products for body include: OTC unscented Dove for sensitive skin or OTC Vanicream cleansing bar.    Recommended facial cleansers include: OTC CeraVe hydrating facial cleanser or OTC Cetaphil daily facial cleanser.    Avoid use of    Scented/perfumed products    Irritating clothing (wool, new jeans, new/unwashed clothing, scratchy synthetics)    Neosporin or triple antibiotic topical products    Products containing aloe, herbs, Vitamin E, or other \"natural ingredients\".    Dryer sheets or fabric softeners (while symptoms are present)    If a topical medication is prescribed, apply topical prescription first, followed by use of moisturizing product.    The patient was counseled about sunscreens and sun avoidance. The patient was counseled to check the skin regularly and report any lesion that is new, changing, itching, scabbing, bleeding or otherwise bothersome. The patient was discharged ambulatory and in stable condition.    TT: 25 minutes.  CT: 15 minutes.    The information in this document, created by the medical scribe for me, accurately reflects the services I personally performed and the decisions made by me. I have reviewed and approved this document for accuracy prior to leaving the patient care area.  January 3, 2019 11:03 AM  Latoya Hernandes MD  [facility]    Again, thank you for allowing me to participate in the care of your patient.        Sincerely,        Latoya Hernandes MD    "

## 2019-01-03 NOTE — PROGRESS NOTES
[facility] - PRIMARY CARE SKIN    CC: skin cancer screening (full-body)  SUBJECTIVE:   Kristen Bernstein is a(n) 53 year old female who presents to clinic today for a full-body skin exam.    Bothersome lesions noticed by the patient or other skin concerns :  Issue One: She has noticed chronic but episodic itchiness at the base of the neck. It is most prominent at night, possibly with increased heat. She has used triamcinolone 0.1% cream twice daily since 12/17/18.  She uses CeraVe moisturizer cream on a daily basis. She is also using Free&Clear shampoo and conditioner.    Personal Medical History  Skin cancer: NO  Eczema Psoriasis Autoimmune   NO NO NO   Other: history of sensitive skin.    Family Medical History  Skin cancer: NO  Eczema Psoriasis Autoimmune   NO NO NO     Sun Exposure History  Previous history of significant sun exposure: YES    Occupation: (indoor).    Refer to electronic medical record (EMR) for past medical history and medications.    INTEGUMENTARY/SKIN: POSITIVE for pruritus  ROS: 14 point review of systems was negative except the symptoms listed above in the HPI.    This document serves as a record of the services and decisions personally performed and made by Latoya Hernandes MD and was created by Bora Lomeli, a trained medical scribe, based on personal observations and provider statements to the medical scribe.  January 3, 2019 11:03 AM   Bora Lomeli    OBJECTIVE:   GENERAL: healthy, alert and no distress.  SKIN: [place] Skin Type - II.  This patient was examined from the top of the head to the bottom of the feet  including scalp, face, neck, trunk, buttocks, both arms, both legs, both hands, both feet, and all fingers and toes. The dermatoscope was used to help evaluate pigmented lesions.  Skin Pertinent Findings:  Face: Scattered brown, macule(s) most consistent with benign solar lentigo.    Upper extremities: Scattered brown, macule(s) most consistent with benign solar lentigo.  Scattered brown macules of various sizes and shapes most consistent with (benign) melanocytic nevi.    Anterior trunk: scattered brown, macule(s) most consistent with benign solar lentigo. brown macules of various sizes and shapes most consistent with (benign) melanocytic nevi.    Back: brown, macule(s) most consistent with benign solar lentigo. brown macules of various sizes and shapes most consistent with (benign) melanocytic nevi.    Posterior lower extremities: Scattered brown macules of various sizes and shapes most consistent with (benign) melanocytic nevi.    Right dorsal fourth toe: 3 mm in size brown macule(s) most consistent with benign nevus(i).    Significant Findings:  Patch of maculopapular eruption on the shoulders. Another similar patch at base of occipital scalp.    Left medial distal thigh: 7 x 3 mm in size irregularly pigmented lesion. ? Atypical nevus ? Other    Left leg, 17 cm distal to patella: 4 mm in size irregularly pigmented brown macule. ? Atypical nevus ? Other    ASSESSMENT:     Encounter Diagnoses   Name Primary?     Skin cancer screening Yes     Solar lentigo      Multiple benign melanocytic nevi      Neoplasm of uncertain behavior of skin      Eczema, unspecified type          PLAN:   Patient Instructions   FUTURE APPOINTMENTS  Follow up in 1 year(s) for a full-body skin cancer screening.  Follow up per pathology report.    WOUND CARE INSTRUCTIONS  1. Wash hands before every dressing change.  2. After 24 hours, change dressing daily.  3. Wash the wound area with a mild soap, then rinse.  4. Gently pat dry with a sterile gauze or Q-tip.  5. Using a Q-tip, apply Vaseline or Aquaphor only over entire wound. Do NOT use Neosporin - as many people react to neomycin.  6. Finally, cover with a bandage or sterile non-stick gauze with micropore paper tape.  7. Repeat once daily until wound has healed.      Soap, water and shampoo will not hurt this area.    Do not go swimming or take baths, but  "showering is encouraged.    Limit use of the area where the procedure was done for a few days to allow for optimal healing.    If you experience bleeding:  Wash hands and hold firm pressure on the area for 10 minutes without checking to see if the bleeding has stopped. \"Checking\" pulls off the protective wound clot and restarts the bleeding all over again. Re-apply pressure for 10 minutes if necessary to stop bleeding.  Use additional sterile gauze and tape to maintain pressure once bleeding has stopped.  If bleeding continues, then call back to clinic at [phone].    Signs of Infection:  Infection can occur in any area where skin has been disrupted.  If you notice persistent redness, swelling, colored drainage, increasing pain, fever or other signs of infection, please call us at: [phone] and ask to have me or my colleague paged. We will call you back to discuss.    Pathology Results:  You will be notified, generally via letter or MyChart, in approximately 10 days. If there is anything we need to discuss or further treatment needed, I will call you to discuss it.    PATIENT INFORMATION : WOUNDS  During the healing process you will notice a number of changes. All wounds develop a small halo of redness surrounding the wound.  This means healing is occurring. Severe itching with extensive redness usually indicates sensitivity to the ointment or bandage tape used to dress the wound.  You should call our office if this develops.      Swelling  and/or discoloration around your surgical site is common, particularly when performed around the eye.    All wounds normally drain.  The larger the wound the more drainage there will be.  After 7-10 days, you will notice the wound beginning to shrink and new skin will begin to grow.  The wound is healed when you can see skin has formed over the entire area.  A healed wound has a healthy, shiny look to the surface and is red to dark pink in color to normalize.  Wounds " "may take approximately 4-6 weeks to heal.  Larger wounds may take 6-8 weeks. After the wound is healed you may discontinue dressing changes.    You may experience a sensation of tightness as your wound heals. This is normal and will gradually subside.    Your healed wound may be sensitive to temperature changes. This sensitivity improves with time, but if you re having a lot of discomfort, try to avoid temperature extremes.    Patients frequently experience itching after their wound appears to have healed because of the continue healing under the skin.  Plain Vaseline will help relieve the itching.    SUN PROTECTION INSTRUCTIONS  Sun damage can lead to skin cancer and premature aging of the skin.      The best way to protect from sun damage to your skin is to avoid the sun during peak hours (10 am - 2 pm) even on overcast days.    Never use tanning beds. Tanning beds are associated with much higher risks of skin cancer.    All tanning damages the skin. Aim for ivory skin year round and you will have less trouble with your skin in years to come. There is no merit in getting \"a base tan\" before a warm weather vacation, as any tanning indicates your body's response to sun damage.    Stop smoking. Smokers have higher rates of skin cancer and also have premature skin wrinkling.    Use UPF sun-protective clothing, which while more expensive initially provides longer lasting coverage without having to worry about remembering to re-apply.  1. Wear a wide-brimmed hat and sunglasses.   2. Wear sun-protective clothing.  [employer]yn[employer] and other [employer] make sun protective clothing that are stylish, comfortable and cool.   [employer] and other [employer] make UV arm sleeves suitable for golfing, gardening and other activities.    Sunscreen instructions:  1. Use sunscreens with Zinc Oxide, Titanium Dioxide or Avobenzone to protect from UVA rays.  2. Use SPF 30-50+ to protect from UVB rays.  3. Re-apply every 2 hours " "even if water resistant.  4. Apply on your face every day even when cloudy and even in the winter. UVA \"aging rays\" penetrate window glass and are just as strong in the winter as in the summer.    FYI  You should use about 3 tablespoons of sunscreen to protect your whole body. Thus a typical eight ounce bottle of sunscreen should last 4 applications. Remember, that the SPF rating is compromised if you don t apply enough. Most people only apply 1/2 - 1/3 of the amount they need. Also don t forget areas such as your ears, feet, upper back and harder to reach places. Keep in mind that these amounts should be increased for larger body sizes.    Sunscreens with titanium dioxide and/or zinc oxide in the active ingredients are physical blockers as opposed to chemical blockers. Chemical-free sunscreens should not irritate the skin.    Spray-on sunscreens may be used for touch-up application only, not as a base layer. Also, use with caution around small children due to inhalation risk.    SPF means sun protection factor, which is just the degree to which the sunscreen can protect against UVB rays. There is no rating system for UVA rays. SPF is calculated as the time skin will burn when sunscreen is applied vs. skin without sunscreen.    Water resistant sunscreens should be re-applied every 1-2 hours.    Product Recommendations:    Consider use of sunscreen sticks with Zinc Oxide and Titanium Dioxide active ingredients such as Neutrogena Pure&Free Baby Sunscreen Stick.    Good examples include: Blue Lizard, EltaMD, Solbar    Good daily moisturizers with SPF: Vanicream, CeraVe.    For sensitive skin, consider : SkinMedica Essential Defense Mineral Shield Broad Spectrum SPF 35    Men: consider use of Neutrogena Triple Protect Facial Lotion    Avoid retinyl palmitate products.  Avoid combination products that include both sunscreen and insect repellant, as sunscreen should be applied every 2 hours, but insect repellant should " not be applied as frequently.    For more information:  https://www.skincancer.org/prevention/sun-protection/sunscreen/sunscreens-safe-and-effective    TOPICAL STEROID INSTRUCTIONS  Betamethasone dipropionate 0.05% cream.  Apply two times per day for 10-14 consecutive days. Then, use only when needed.  1. Wash hands before applying topical steroid.  2. Apply sparingly (just enough to rub in) onto affected areas of the neck and shoulders.  (Use the adult fingertip unit (FTU) as a guide.) Apply no more than 1 FTU per area.    3. Wash off any excess, unused topical steroid.    This higher strength steroid should never be used on face nor groin.    After the initial treatment, topical steroid may be used as needed for flare-ups but only for short-term treatment. If you are using this for prolonged periods of time to control flare-ups, return to clinic for re-evaluation of treatment.    Keep in mind to also regularly use moisturizer, as this preventative measure can help maintain your skin's natural protective moisture barrier.    DRY SKIN MANAGEMENT INSTRUCTIONS  Routine use of moisturizer is important for healthy, resilient skin not just for soft skin.     Sealing in moisture    Twice daily use of a moisturizer such as over-the-counter (OTC) CeraVe moisturizer cream (in the jar). CeraVe products contain ceramides and filaggrin proteins that can help to maintain the body's moisture layer.    Twice daily use of a moisturizer such as OTC Vaseline petroleum jelly or OTC Aquaphor ointment.    After cleansing or washing, always apply moisturizer immediately after drying off (pat dry only) for best effect.    Protection while hydrating    Do not overuse soap. Unless you have been sweating extensively, just apply soap to groin and armpits.    Recommended products for body include: OTC unscented Dove for sensitive skin or OTC Vanicream cleansing bar.    Recommended facial cleansers include: OTC CeraVe hydrating facial cleanser  "or OTC Cetaphil daily facial cleanser.    Avoid use of    Scented/perfumed products    Irritating clothing (wool, new jeans, new/unwashed clothing, scratchy synthetics)    Neosporin or triple antibiotic topical products    Products containing aloe, herbs, Vitamin E, or other \"natural ingredients\".    Dryer sheets or fabric softeners (while symptoms are present)    If a topical medication is prescribed, apply topical prescription first, followed by use of moisturizing product.    The patient was counseled about sunscreens and sun avoidance. The patient was counseled to check the skin regularly and report any lesion that is new, changing, itching, scabbing, bleeding or otherwise bothersome. The patient was discharged ambulatory and in stable condition.    TT: 25 minutes.  CT: 15 minutes.    The information in this document, created by the medical scribe for me, accurately reflects the services I personally performed and the decisions made by me. I have reviewed and approved this document for accuracy prior to leaving the patient care area.  January 3, 2019 11:03 AM  Latoya Hernandes MD  [facility]  "

## 2019-01-09 LAB — COPATH REPORT: NORMAL

## 2019-01-10 ENCOUNTER — TELEPHONE (OUTPATIENT)
Dept: FAMILY MEDICINE | Facility: CLINIC | Age: 54
End: 2019-01-10

## 2019-01-10 NOTE — TELEPHONE ENCOUNTER
kurt returned call- appointment scheduled for 40 minute wide excision 1/21/19    Shannon ReneeRN BSN  [facility]  [phone]

## 2019-01-10 NOTE — TELEPHONE ENCOUNTER
"Voice mail :     A. Skin, left thigh:   - Atypical intraepidermal melanocytic proliferation - see comment and   description     B. Skin, left leg:   - Compound dysplastic nevus with mild atypia - (see description)     COMMENT:   A. The features seen in this specimen are the type which may be seen in   early evolving melanoma in situ;   re-excision is recommended at this site. This case was reviewed at the   dermatopathology consensus conference.   I have personally reviewed all specimens and/or slides, including the   listed special stains, and used them   with my medical judgement to determine or confirm the final diagnosis.     RECOMMENDATIONS :       Atypical nevus on the left thigh has some changes that can be seen in early evolving melanoma in situ therefore recommend wide excision. 40 \" wide excision with myself.       Just mild atypia in the nevus on the left leg.  "

## 2019-01-11 ENCOUNTER — OFFICE VISIT (OUTPATIENT)
Dept: FAMILY MEDICINE | Facility: CLINIC | Age: 54
End: 2019-01-11
Payer: COMMERCIAL

## 2019-01-11 VITALS
DIASTOLIC BLOOD PRESSURE: 80 MMHG | TEMPERATURE: 98.3 F | HEIGHT: 62 IN | OXYGEN SATURATION: 99 % | HEART RATE: 102 BPM | WEIGHT: 162 LBS | SYSTOLIC BLOOD PRESSURE: 132 MMHG | BODY MASS INDEX: 29.81 KG/M2

## 2019-01-11 DIAGNOSIS — R94.4 ABNORMAL RENAL FUNCTION TEST: ICD-10-CM

## 2019-01-11 DIAGNOSIS — M79.602 PAIN OF LEFT UPPER EXTREMITY: Primary | ICD-10-CM

## 2019-01-11 PROCEDURE — 36415 COLL VENOUS BLD VENIPUNCTURE: CPT | Performed by: FAMILY MEDICINE

## 2019-01-11 PROCEDURE — 80048 BASIC METABOLIC PNL TOTAL CA: CPT | Performed by: FAMILY MEDICINE

## 2019-01-11 PROCEDURE — 99213 OFFICE O/P EST LOW 20 MIN: CPT | Performed by: FAMILY MEDICINE

## 2019-01-11 ASSESSMENT — MIFFLIN-ST. JEOR: SCORE: 1294.43

## 2019-01-11 NOTE — PROGRESS NOTES
"  SUBJECTIVE:   Kristen Bernstein is a 53 year old female who presents to clinic today for the following health issues:      Concern - pain shingles injection  Onset: Dec. 17th    Description:   Has had shoulder pain since her shingles injection.  Was done in her left arm    Intensity: mild    Progression of Symptoms:  same    Accompanying Signs & Symptoms:  Not constant pain, no redness no fever    Previous history of similar problem:   no    Precipitating factors:   Worsened by: certain movements are very painful in shoulder arm    Alleviating factors:  Improved by:           Tobacco  Allergies  Meds  Problems  Med Hx  Surg Hx  Fam Hx  Soc Hx        Reviewed and updated as needed this visit by Provider  Tobacco  Allergies  Meds  Problems  Med Hx  Surg Hx  Fam Hx         ROS:  CONSTITUTIONAL: NEGATIVE for fever, chills, change in weight  ENT/MOUTH: NEGATIVE for ear, mouth and throat problems  RESP: NEGATIVE for significant cough or SOB  CV: NEGATIVE for chest pain, palpitations or peripheral edema    OBJECTIVE:                                                    /80   Pulse 102   Temp 98.3  F (36.8  C) (Tympanic)   Ht 1.585 m (5' 2.4\")   Wt 73.5 kg (162 lb)   SpO2 99%   BMI 29.25 kg/m    Body mass index is 29.25 kg/m .   GENERAL: healthy, alert, well nourished, well hydrated, no distress  NECK: no tenderness, no adenopathy, no asymmetry, no masses, no stiffness; thyroid- normal to palpation  RESP: lungs clear to auscultation - no rales, no rhonchi, no wheezes  CV: regular rates and rhythm, normal S1 S2, no S3 or S4 and no murmur, no click or rub -  MS: No localized tenderness noted in the shoulder or deltoid area.  Normal range of motion noted with active and passive at the left shoulder.  Negative Neer's, negative liftoff subscapularis and negative empty can testing.       ASSESSMENT/PLAN:                                                      1. Pain of left upper extremity  No imaging " indicated at this time.  No signs of local inflammation noted.  Hard to say if the pain felt now is related to the injection or not but unlikely in this case.  This was discussed with the patient.  Recommending using Voltaren gel locally to the affected site of pain.  Also given some exercises to use which are the same as her rotator cuff exercises.  If symptoms are not improving in the next few weeks, instructed to notify me back.  - diclofenac (VOLTAREN) 1 % topical gel; Place onto the skin 2 times daily as needed for moderate pain  Dispense: 50 g; Refill: 0    2. Abnormal renal function test  Repeat BMP ordered.  Previously noted to have slightly abnormal renal functions.  - Basic metabolic panel      Evelin Soto MD  [facility] [place]

## 2019-01-11 NOTE — PATIENT INSTRUCTIONS
Rotator Cuff Injury   What is a rotator cuff injury?   A rotator cuff injury is a strain or tear in the group of tendons and muscles that hold your shoulder joint together and help move your shoulder.   How does it occur?   A rotator cuff injury may result from:     using your arm to break a fall     falling onto your arm     lifting a heavy object     use of your shoulder in sports with a repetitive overhead movement, such as swimming, baseball (mainly pitchers), football, and tennis, which gradually strains the tendon     manual labor such as painting, plastering, raking leaves, or housework   What are the symptoms?   The symptoms of a torn rotator cuff are:     arm and shoulder pain     shoulder weakness     shoulder tenderness     loss of shoulder movement, especially overhead   How is it diagnosed?   Your healthcare provider will examine you and check your shoulder for pain, tenderness, and loss of motion as you move your arm in all directions. Your provider will ask if your shoulder pain began suddenly or gradually. You may have an X-ray to make sure there are not any fractures or bone spurs.   Based on these results, you may have other tests or procedures right away or later, such as:     magnetic resonance imaging (MRI), which creates images of your shoulder and surrounding structures with sound waves     an arthrogram, which is an X-ray or MRI that is taken after a special dye has been injected into your shoulder joint to outline its soft structures     arthroscopy, a surgical procedure in which a small instrument is inserted into your shoulder joint so your provider can look directly at your rotator cuff   What is the treatment?   A tendon in your shoulder can be inflamed, partially torn, or completely torn. What is done about it depends on how torn it is and how much it hurts.   If your tear is a minor one, it can be left to heal by itself if it does not interfere with  your everyday activities. Your treatment plan should include:     proper sitting posture, in which your head and shoulders are balanced     rest for your shoulder, which means avoiding strenuous activity or any overhead motion that causes pain     ice packs at least once a day, and preferably 2 or 3 times a day     doing the exercises your healthcare provider gives you     anti-inflammatory drugs. Adults aged 65 years and older should not take non-steroidal anti-inflammatory medicine for more than 7 days without their healthcare provider's approval.     physical therapy to strengthen your shoulder as it heals   If you have a bad tear, you may need to have it repaired by arthroscopy. Arthroscopy can be used to perform surgery on a joint as well as to see inside the joint. The rough edges of a torn tendon can be trimmed and left to heal. Larger tears can be stitched back together. After surgery, your treatment plan will include physical therapy to strengthen your shoulder as it heals.   How long will the effects last?   Full recovery depends on what is torn and how it is treated.   When can I return to my normal activities?   Everyone recovers from an injury at a different rate. Return to your activities will be determined by how soon your shoulder recovers, not by how many days or weeks it has been since your injury has occurred. In general, the longer you have symptoms before you start treatment, the longer it will take to get better. The goal of rehabilitation is to return you to your normal activities as soon as is safely possible. If you return too soon you may worsen your injury.   You may safely return to your normal activities when:     Your injured shoulder has full range of motion without pain.     Your injured shoulder has regained normal strength compared to the uninjured shoulder.   What can be done to help prevent this from recurring?   The best way to prevent a recurrence is to strengthen your shoulder  muscles and keep them in peak condition with shoulder exercises.           Rotator Cuff Strain Rehabilitation Exercises                  You may do all of these exercises right away.   Isometric shoulder external rotation:  a doorway with your elbow bent 90 degrees and the back of the wrist on your injured side pressed against the door frame. Try to press your hand outward into the door frame. Hold for 5 seconds. Do 3 sets of 10.   Isometric shoulder internal rotation:  a doorway with your elbow bent 90 degrees and the front of the wrist on your injured side pressed against the door frame. Try to press your palm into the door frame. Hold for 5 seconds. Do 3 sets of 10.   Wand exercise: Flexion: Stand upright and hold a stick in both hands, palms down. Stretch your arms by lifting them over your head, keeping your arms straight. Hold for 5 seconds and return to the starting position. Repeat 10 times.   Wand exercise: Extension: Stand upright and hold a stick in both hands behind your back. Move the stick away from your back. Hold the end position for 5 seconds. Relax and return to the starting position. Repeat 10 times.   Wand exercise: External rotation: Lie on your back and hold a stick in both hands, palms up. Your upper arms should be resting on the floor with your elbows at your sides and bent 90 degrees. Use your uninjured arm to push your injured arm out away from your body. Keep the elbow of your injured arm at your side while it is being pushed. Hold the stretch for 5 seconds. Repeat 10 times.   Wand exercise: Shoulder abduction and adduction: Stand and hold a stick with both hands, palms facing away from your body. Rest the stick against the front of your thighs. Use your uninjured arm to push your injured arm out to the side and up as high as possible. Keep your arms straight. Hold for 5 seconds. Repeat 10 times.   Resisted shoulder external rotation: Stand sideways next to a door with your  injured arm farther from the door. Tie a knot in the end of the tubing and shut the knot in the door at waist level. Hold the other end of the tubing with the hand of your injured arm. Rest the hand of your injured arm across your stomach. Keeping your elbow in at your side, rotate your arm outward and away from your waist. Make sure you keep your elbow bent 90 degrees and your forearm parallel to the floor. Repeat 10 times. Build up to 3 sets of 10.   Resisted shoulder internal rotation: Stand sideways next to a door with your injured arm closest to the door. Tie a knot in the end of the tubing and shut the knot in the door at waist level. Hold the other end of the tubing with the hand of your injured arm. Bend the elbow of your injured arm 90 degrees. Keeping your elbow in at your side, rotate your forearm across your body and then back to the starting position. Make sure you keep your forearm parallel to the floor. Do 3 sets of 10.   Scaption: Stand with your arms at your sides and with your elbows straight. Slowly raise your arms to eye level. As you raise your arms, spread them apart so that they are only slightly in front of your body (at about a 30-degree angle to the front of your body). Point your thumbs toward the ceiling. Hold for 2 seconds and lower your arms slowly. Do 3 sets of 10. Progress to holding a soup can or light weight when you are doing the exercise and increase the weight as the exercise gets easier.   Side-lying external rotation: Lie on your uninjured side with your injured arm at your side and your elbow bent 90 degrees. Keeping your elbow against your side, raise your forearm toward the ceiling and hold for 2 seconds. Slowly lower your arm. Do 3 sets of 10. You can start doing this exercise holding a soup can or light weight and gradually increase the weight as long as there is no pain.   Horizontal abduction: Lie on your stomach on a table or the edge of a bed with the arm on your  "injured side hanging down over the edge. Raise your arm out to the side, with your thumb pointed toward the ceiling, until your arm is parallel to the floor. Hold for 2 seconds and then lower it slowly. Start this exercise with no weight. As you get stronger, add a light weight or hold a soup can. Do 3 sets of 10.   Push-up with a plus: Begin on the floor on your hands and knees. Keep your arms a shoulder width apart and lift your feet off the floor. Arch your back as high as possible and round your shoulders (this is the \"plus\" part or the exercise). Bend your elbows and lower your body to the floor. Return to the starting position and arch your back again. Do 3 sets of 10.   Published by [employer].  This content is reviewed periodically and is subject to change as new health information becomes available. The information is intended to inform and educate and is not a replacement for medical evaluation, advice, diagnosis or treatment by a healthcare professional.   Written by Caryn Lazaro MS, PT, and Lizett Silva PT, [facility], [facility], for [employer].   ? 2010 [email or web address] and/or its affiliates. All Rights Reserved.   Copyright   [employer] 2011  Adult Health Advisor                "

## 2019-01-13 LAB
ANION GAP SERPL CALCULATED.3IONS-SCNC: 4 MMOL/L (ref 3–14)
BUN SERPL-MCNC: 16 MG/DL (ref 7–30)
CALCIUM SERPL-MCNC: 9.2 MG/DL (ref 8.5–10.1)
CHLORIDE SERPL-SCNC: 105 MMOL/L (ref 94–109)
CO2 SERPL-SCNC: 28 MMOL/L (ref 20–32)
CREAT SERPL-MCNC: 0.98 MG/DL (ref 0.52–1.04)
GFR SERPL CREATININE-BSD FRML MDRD: 65 ML/MIN/{1.73_M2}
GLUCOSE SERPL-MCNC: 76 MG/DL (ref 70–99)
POTASSIUM SERPL-SCNC: 4.3 MMOL/L (ref 3.4–5.3)
SODIUM SERPL-SCNC: 137 MMOL/L (ref 133–144)

## 2019-01-15 ENCOUNTER — TELEPHONE (OUTPATIENT)
Dept: FAMILY MEDICINE | Facility: CLINIC | Age: 54
End: 2019-01-15

## 2019-01-15 NOTE — TELEPHONE ENCOUNTER
"Encounter : voicemail  I will need to talk to her regarding potential early evolving melanoma in situ.    40\" wide excision with myself.     A. Skin, left thigh:   - Atypical intraepidermal melanocytic proliferation - see comment and   description     B. Skin, left leg:   - Compound dysplastic nevus with mild atypia - (see description)     COMMENT:   A. The features seen in this specimen are the type which may be seen in   early evolving melanoma in situ;   re-excision is recommended at this site. This case was reviewed at the   dermatopathology consensus conference.   I have personally reviewed all specimens and/or slides, including the   listed special stains, and used them   with my medical judgement to determine or confirm the final diagnosis.   "

## 2019-01-16 NOTE — TELEPHONE ENCOUNTER
Provider spoke with patient on 1/15/19- appointment scheduled    Shannon Renee,RN BSN  [facility]  [phone]

## 2019-01-21 ENCOUNTER — OFFICE VISIT (OUTPATIENT)
Dept: FAMILY MEDICINE | Facility: CLINIC | Age: 54
End: 2019-01-21
Payer: COMMERCIAL

## 2019-01-21 VITALS — HEART RATE: 67 BPM | DIASTOLIC BLOOD PRESSURE: 78 MMHG | OXYGEN SATURATION: 97 % | SYSTOLIC BLOOD PRESSURE: 138 MMHG

## 2019-01-21 DIAGNOSIS — D22.72 ATYPICAL NEVUS OF LEFT THIGH: Primary | ICD-10-CM

## 2019-01-21 PROBLEM — Z86.006 HISTORY OF MELANOMA IN SITU: Status: ACTIVE | Noted: 2019-01-21

## 2019-01-21 PROCEDURE — 11402 EXC TR-EXT B9+MARG 1.1-2 CM: CPT | Mod: 51 | Performed by: FAMILY MEDICINE

## 2019-01-21 PROCEDURE — 12032 INTMD RPR S/A/T/EXT 2.6-7.5: CPT | Performed by: FAMILY MEDICINE

## 2019-01-21 PROCEDURE — 88305 TISSUE EXAM BY PATHOLOGIST: CPT | Mod: TC | Performed by: FAMILY MEDICINE

## 2019-01-21 NOTE — PROCEDURES
Name: Wide Excision  Indication: Wide excision of tissue for pathology evaluation of atypical proliferation.  Location(s): Left medial distal thigh: 6 mm in size healing shave site. 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  Completed by: Dulce Hernandes MD.  Photo Taken: NO.  Anesthesia: Patient was anesthetized by infiltrating the area surrounding the lesion with 1% lidocaine and epinephrine [id number].  Note: Prior to procedure we discussed expectations for healing, risk of infection, and scar formation. Discussed other treatment options available. Discussed the risk of pain, infection, scarring, hypo- or hyperpigmentation and recurrence or need for re-treatment. The benefits of treatment and alternative treatments were also discussed.    During this procedure, the universal protocol was utilized. The patient's identity was confirmed by no less than two patient identifiers, correct procedure was verified, correct site was verified and marked as applicable and a final pause was completed.    Sterile technique was used throughout the procedure. The skin was cleaned and prepped with Chloroprep. A 5 mm margin was marked out on each side of the lesion. Sterile drapes were laid out. Once adequate anesthesia was obtained, an elliptical incision was made with a #15 blade through the epidermis and dermis. The tissue specimen was placed in formalin and sent to pathology.    Direct pressure and monopolar cautery was applied for hemostasis.  Defect was approximated with 0 Vicryl.  Edges were approximated with 6-0 Rapidgut interrupted simple stitch.  Minimal bleeding occurred. Dressing was applied and patient left in satisfactory condition.    Widest diameter: 1.8 cm.  Final length of defect: 6 cm.    Total number of non-dissolvable stitches in closure of epidermis: 0.  Suture removal / Bandage change: 7 days.    Primary provider and referring provider will be informed regarding tissue  sample report (and/or wound culture) when it returns.

## 2019-01-21 NOTE — PATIENT INSTRUCTIONS
"FUTURE APPOINTMENTS  Follow up in 7 days for bandage change with the nurse.  Follow up in 1 year for a full-body skin cancer screening.    If any problems with wound healing:  call directly back to clinic RN at [phone]     BANDAGE CARE INSTRUCTIONS    Keep dressing completely dry.    Make sure to avoid soaking the procedure area. Cover with Tegaderm or plastic wrap when showering.    Limit use of the area where the procedure was done for a few days to allow for optimal healing.    If you experience bleeding:  Wash hands and hold firm pressure on the area for 10 minutes without checking to see if the bleeding has stopped. \"Checking\" pulls off the protective wound clot and restarts the bleeding all over again. Re-apply pressure for 10 minutes if necessary to stop bleeding.  Use additional sterile gauze and tape to maintain pressure once bleeding has stopped.  If bleeding continues, then call back to clinic at [phone].    Signs of Infection:  Infection can occur in any area where skin has been disrupted.  If you notice persistent redness, swelling, colored drainage, increasing pain, fever or other signs of infection, please call us at: [phone] and ask to have me or my colleague paged. We will call you back to discuss.    Pathology Results:  You will be notified, generally via letter or MyChart, in approximately 10 days. If there is anything we need to discuss or further treatment needed, I will call you to discuss it.    PAIN CONTROL INSTRUCTIONS    First, try either acetaminophen (Tylenol), or NSAID ibuprofen (Advil, Dandre, etc), or NSAID naproxen (Aleve, Naprosyn)    Acetaminophen dosage : one 325-500 mg tablet taken every 4-6 hours with a maximum of 4000 mg/day = 4 g/day    Ibuprofen dosage : one 600 mg tablet taken every 4-6 hours with a maximum of 4-6 tablets/day    Naproxen dosage : one 500 mg tablet taken twice daily with a maximum of 2 tablets/day     Second, try combining acetaminophen and " "an NSAID - often the combination will work better then either one alone.    SUN PROTECTION INSTRUCTIONS  Sun damage can lead to skin cancer and premature aging of the skin.      The best way to protect from sun damage to your skin is to avoid the sun during peak hours (10 am - 2 pm) even on overcast days.    Never use tanning beds. Tanning beds are associated with much higher risks of skin cancer.    All tanning damages the skin. Aim for ivory skin year round and you will have less trouble with your skin in years to come. There is no merit in getting \"a base tan\" before a warm weather vacation, as any tanning indicates your body's response to sun damage.    Stop smoking. Smokers have higher rates of skin cancer and also have premature skin wrinkling.    Use UPF sun-protective clothing, which while more expensive initially provides longer lasting coverage without having to worry about remembering to re-apply.  1. Wear a wide-brimmed hat and sunglasses.   2. Wear sun-protective clothing.  [employer] and other [employer] make sun protective clothing that are stylish, comfortable and cool.   [employer] and other [employer] make UV arm sleeves suitable for golfing, gardening and other activities.    Sunscreen instructions:  1. Use sunscreens with Zinc Oxide, Titanium Dioxide or Avobenzone to protect from UVA rays.  2. Use SPF 30-50+ to protect from UVB rays.  3. Re-apply every 2 hours even if water resistant.  4. Apply on your face every day even when cloudy and even in the winter. UVA \"aging rays\" penetrate window glass and are just as strong in the winter as in the summer.    FYI  You should use about 3 tablespoons of sunscreen to protect your whole body. Thus a typical eight ounce bottle of sunscreen should last 4 applications. Remember, that the SPF rating is compromised if you don t apply enough. Most people only apply 1/2 - 1/3 of the amount they need. Also don t forget areas such as your ears, feet, " upper back and harder to reach places. Keep in mind that these amounts should be increased for larger body sizes.    Sunscreens with titanium dioxide and/or zinc oxide in the active ingredients are physical blockers as opposed to chemical blockers. Chemical-free sunscreens should not irritate the skin.    Spray-on sunscreens may be used for touch-up application only, not as a base layer. Also, use with caution around small children due to inhalation risk.    SPF means sun protection factor, which is just the degree to which the sunscreen can protect against UVB rays. There is no rating system for UVA rays. SPF is calculated as the time skin will burn when sunscreen is applied vs. skin without sunscreen.    Water resistant sunscreens should be re-applied every 1-2 hours.    Product Recommendations:    Consider use of sunscreen sticks with Zinc Oxide and Titanium Dioxide active ingredients such as Neutrogena Pure&Free Baby Sunscreen Stick.    Good examples include: Blue Lizard, EltaMD, Solbar    Good daily moisturizers with SPF: Vanicream, CeraVe.    For sensitive skin, consider : SkinMedica Essential Defense Mineral Shield Broad Spectrum SPF 35    Men: consider use of Neutrogena Triple Protect Facial Lotion    Avoid retinyl palmitate products.  Avoid combination products that include both sunscreen and insect repellant, as sunscreen should be applied every 2 hours, but insect repellant should not be applied as frequently.    For more information:  https://www.skincancer.org/prevention/sun-protection/sunscreen/sunscreens-safe-and-effective    SKIN CANCER SELF-EXAM INSTRUCTIONS  Check every month in the mirror or with a household member. Be aware of any changes, especially bleeding or tenderness. Also, make sure to check your nails for color changes after removal of nail polish.    For melanoma, check for:  A - Asymmetry. One half unlike the other half.  B - Border. Irregular, scalloped, ragged, notched, blurred or  poorly defined borders.  C - Color. Color variations from one area to another, with shades of tan, brown and/or black present. Sometimes white, red or blue.  D - Diameter. Greater than 6 mm (about the size of a pencil eraser). Any new growth of a mole should be concerning and be evaluated.  E - Evolving. A mole or skin lesion that looks different from the rest or is changing in size, shape or color.    For basal cell carcinoma and squamous cell carcinoma, check for:    Sores, shiny bumps, nodules, scaly lesions, or wart-like growths that are itchy, tender, crusting, scabbing, eroding, oozing or bleeding.    Open sores/wounds or reddish/irritated areas that do not heal within 2-3 weeks.    Scar-like areas that are white, yellow or waxy in color.    Also, check your lymph nodes for tenderness or swelling every month. Check front of neck, back of neck, over the collarbone, and in the armpits. (Note that if you have a cold or other illness, they may be swollen)    Consider the following resources:    100 Questions & Answers about Melanoma and Other Skin Cancers by Scar Edwards MD (ISBN-13: [id number])    NCCN Guidelines for Patients - Melanoma by the National Comprehensive Cancer Network (NCCN) (ISBN-13 978-5511680130)

## 2019-01-21 NOTE — PROGRESS NOTES
[facility] - PRIMARY CARE SKIN    CC: atypical intraepidermal melanocytic proliferation  SUBJECTIVE:   Kristen Bernstein is a(n) 53 year old female who presents to clinic today with  for follow-up wide excision of a lesion on the left thigh. Pathology results are below.    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  Location: left medial distal thigh.  Date of diagnosis: 1/3/2019.    Personal history of other skin cancer: NO.  Personal history of abnormal (dysplastic) moles: YES - mild dysplastic nevus(i) on left leg.    Personal Medical History  Eczema Psoriasis Autoimmune   NO NO NO   Other: sensitive skin.    Family history of melanoma: NO.  Family history of other skin cancer: NO.  Family history of abnormal (dysplastic) moles: NO.    Family Medical History  Eczema Psoriasis Autoimmune   NO NO NO     Sun Exposure History  Previous history of significant sun exposure: YES    Occupation: (indoor).    Refer to electronic medical record (EMR) for past medical history and medications.    ROS: 14 point review of systems was negative except the symptoms listed above in the HPI.    This document serves as a record of the services and decisions personally performed and made by Latoya Hernandes MD and was created by Bora Lomeli, a trained medical scribe, based on personal observations and provider statements to the medical scribe.  January 21, 2019 11:12 AM   Bora Lomeli    OBJECTIVE:   GENERAL: healthy, alert and no distress.  SKIN: Waddell Skin Type - II.  Legs examined. The dermatoscope was used to help evaluate pigmented lesions.  Skin Pertinent Findings:  Left medial distal thigh: 6 mm in size healing shave site. 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    ASSESSMENT:     Encounter Diagnosis   Name Primary?      "Atypical nevus of left thigh Yes         PLAN:   Patient Instructions   FUTURE APPOINTMENTS  Follow up in 7 days for bandage change with the nurse.  Follow up in 1 year for a full-body skin cancer screening.    If any problems with wound healing:  call directly back to clinic RN at [phone]     BANDAGE CARE INSTRUCTIONS    Keep dressing completely dry.    Make sure to avoid soaking the procedure area. Cover with Tegaderm or plastic wrap when showering.    Limit use of the area where the procedure was done for a few days to allow for optimal healing.    If you experience bleeding:  Wash hands and hold firm pressure on the area for 10 minutes without checking to see if the bleeding has stopped. \"Checking\" pulls off the protective wound clot and restarts the bleeding all over again. Re-apply pressure for 10 minutes if necessary to stop bleeding.  Use additional sterile gauze and tape to maintain pressure once bleeding has stopped.  If bleeding continues, then call back to clinic at [phone].    Signs of Infection:  Infection can occur in any area where skin has been disrupted.  If you notice persistent redness, swelling, colored drainage, increasing pain, fever or other signs of infection, please call us at: [phone] and ask to have me or my colleague paged. We will call you back to discuss.    Pathology Results:  You will be notified, generally via letter or MyChart, in approximately 10 days. If there is anything we need to discuss or further treatment needed, I will call you to discuss it.    PAIN CONTROL INSTRUCTIONS    First, try either acetaminophen (Tylenol), or NSAID ibuprofen (Advil, Dandre, etc), or NSAID naproxen (Aleve, Naprosyn)    Acetaminophen dosage : one 325-500 mg tablet taken every 4-6 hours with a maximum of 4000 mg/day = 4 g/day    Ibuprofen dosage : one 600 mg tablet taken every 4-6 hours with a maximum of 4-6 tablets/day    Naproxen dosage : one 500 mg tablet taken twice daily " "with a maximum of 2 tablets/day     Second, try combining acetaminophen and an NSAID - often the combination will work better then either one alone.    SUN PROTECTION INSTRUCTIONS  Sun damage can lead to skin cancer and premature aging of the skin.      The best way to protect from sun damage to your skin is to avoid the sun during peak hours (10 am - 2 pm) even on overcast days.    Never use tanning beds. Tanning beds are associated with much higher risks of skin cancer.    All tanning damages the skin. Aim for ivory skin year round and you will have less trouble with your skin in years to come. There is no merit in getting \"a base tan\" before a warm weather vacation, as any tanning indicates your body's response to sun damage.    Stop smoking. Smokers have higher rates of skin cancer and also have premature skin wrinkling.    Use UPF sun-protective clothing, which while more expensive initially provides longer lasting coverage without having to worry about remembering to re-apply.  1. Wear a wide-brimmed hat and sunglasses.   2. Wear sun-protective clothing.  [employer] and other [employer] make sun protective clothing that are stylish, comfortable and cool.   [employer] and other [employer] make UV arm sleeves suitable for golfing, gardening and other activities.    Sunscreen instructions:  1. Use sunscreens with Zinc Oxide, Titanium Dioxide or Avobenzone to protect from UVA rays.  2. Use SPF 30-50+ to protect from UVB rays.  3. Re-apply every 2 hours even if water resistant.  4. Apply on your face every day even when cloudy and even in the winter. UVA \"aging rays\" penetrate window glass and are just as strong in the winter as in the summer.    FYI  You should use about 3 tablespoons of sunscreen to protect your whole body. Thus a typical eight ounce bottle of sunscreen should last 4 applications. Remember, that the SPF rating is compromised if you don t apply enough. Most people only apply 1/2 - 1/3 of " the amount they need. Also don t forget areas such as your ears, feet, upper back and harder to reach places. Keep in mind that these amounts should be increased for larger body sizes.    Sunscreens with titanium dioxide and/or zinc oxide in the active ingredients are physical blockers as opposed to chemical blockers. Chemical-free sunscreens should not irritate the skin.    Spray-on sunscreens may be used for touch-up application only, not as a base layer. Also, use with caution around small children due to inhalation risk.    SPF means sun protection factor, which is just the degree to which the sunscreen can protect against UVB rays. There is no rating system for UVA rays. SPF is calculated as the time skin will burn when sunscreen is applied vs. skin without sunscreen.    Water resistant sunscreens should be re-applied every 1-2 hours.    Product Recommendations:    Consider use of sunscreen sticks with Zinc Oxide and Titanium Dioxide active ingredients such as Neutrogena Pure&Free Baby Sunscreen Stick.    Good examples include: Blue Lizard, EltaMD, Solbar    Good daily moisturizers with SPF: Vanicream, CeraVe.    For sensitive skin, consider : SkinMedica Essential Defense Mineral Shield Broad Spectrum SPF 35    Men: consider use of Neutrogena Triple Protect Facial Lotion    Avoid retinyl palmitate products.  Avoid combination products that include both sunscreen and insect repellant, as sunscreen should be applied every 2 hours, but insect repellant should not be applied as frequently.    For more information:  https://www.skincancer.org/prevention/sun-protection/sunscreen/sunscreens-safe-and-effective    SKIN CANCER SELF-EXAM INSTRUCTIONS  Check every month in the mirror or with a household member. Be aware of any changes, especially bleeding or tenderness. Also, make sure to check your nails for color changes after removal of nail polish.    For melanoma, check for:  A - Asymmetry. One half unlike the other  half.  B - Border. Irregular, scalloped, ragged, notched, blurred or poorly defined borders.  C - Color. Color variations from one area to another, with shades of tan, brown and/or black present. Sometimes white, red or blue.  D - Diameter. Greater than 6 mm (about the size of a pencil eraser). Any new growth of a mole should be concerning and be evaluated.  E - Evolving. A mole or skin lesion that looks different from the rest or is changing in size, shape or color.    For basal cell carcinoma and squamous cell carcinoma, check for:    Sores, shiny bumps, nodules, scaly lesions, or wart-like growths that are itchy, tender, crusting, scabbing, eroding, oozing or bleeding.    Open sores/wounds or reddish/irritated areas that do not heal within 2-3 weeks.    Scar-like areas that are white, yellow or waxy in color.    Also, check your lymph nodes for tenderness or swelling every month. Check front of neck, back of neck, over the collarbone, and in the armpits. (Note that if you have a cold or other illness, they may be swollen)    Consider the following resources:    100 Questions & Answers about Melanoma and Other Skin Cancers by Scar Edwards MD (ISBN-13: [id number])    NCCN Guidelines for Patients - Melanoma by the National Comprehensive Cancer Network (NCCN) (ISBN-13 978-8727771476)    Signs of infection (spreading erythema, increasing pain, purulent discharge) were discussed. Limit physical activity for the next several days involving this area. No swimming, keep lesions clean and dry except for showering until the sutures are removed or absorbed. The patient will be notified of the pathology results at the next office visit or via MyChart or phone. The patient was given written discharge instructions and was discharged in stable condition.    TT: 40 minutes.  CT: 10 minutes.    The information in this document, created by the medical scribe for me, accurately reflects the services I personally performed  and the decisions made by me. I have reviewed and approved this document for accuracy prior to leaving the patient care area.  January 21, 2019 11:12 AM  Latoya Hernandes MD  [facility] [place]

## 2019-01-21 NOTE — LETTER
1/21/2019         RE: Kristen Palacios [facility] [id number]        Dear Colleague,    Thank you for referring your patient, Kristen Bernstein, to the [facility] [place]. Please see a copy of my visit note below.    [facility] - PRIMARY CARE SKIN    CC: atypical intraepidermal melanocytic proliferation  SUBJECTIVE:   Kristen Bernstein is a(n) 53 year old female who presents to clinic today with  for follow-up wide excision of a lesion on the left thigh. Pathology results are below.    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  Location: left medial distal thigh.  Date of diagnosis: 1/3/2019.    Personal history of other skin cancer: NO.  Personal history of abnormal (dysplastic) moles: YES - mild dysplastic nevus(i) on left leg.    Personal Medical History  Eczema Psoriasis Autoimmune   NO NO NO   Other: sensitive skin.    Family history of melanoma: NO.  Family history of other skin cancer: NO.  Family history of abnormal (dysplastic) moles: NO.    Family Medical History  Eczema Psoriasis Autoimmune   NO NO NO     Sun Exposure History  Previous history of significant sun exposure: YES    Occupation: (indoor).    Refer to electronic medical record (EMR) for past medical history and medications.    ROS: 14 point review of systems was negative except the symptoms listed above in the HPI.    This document serves as a record of the services and decisions personally performed and made by Latoya Hernandes MD and was created by Bora Lomeli, a trained medical scribe, based on personal observations and provider statements to the medical scribe.  January 21, 2019 11:12 AM   Bora Lomeli    OBJECTIVE:   GENERAL: healthy, alert and no distress.  SKIN: Waddell Skin Type - II.  Legs examined. The dermatoscope was used to help evaluate  "pigmented lesions.  Skin Pertinent Findings:  Left medial distal thigh: 6 mm in size healing shave site. 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    ASSESSMENT:     Encounter Diagnosis   Name Primary?     Atypical nevus of left thigh Yes         PLAN:   Patient Instructions   FUTURE APPOINTMENTS  Follow up in 7 days for bandage change with the nurse.  Follow up in 1 year for a full-body skin cancer screening.    If any problems with wound healing:  call directly back to clinic RN at [phone]     BANDAGE CARE INSTRUCTIONS    Keep dressing completely dry.    Make sure to avoid soaking the procedure area. Cover with Tegaderm or plastic wrap when showering.    Limit use of the area where the procedure was done for a few days to allow for optimal healing.    If you experience bleeding:  Wash hands and hold firm pressure on the area for 10 minutes without checking to see if the bleeding has stopped. \"Checking\" pulls off the protective wound clot and restarts the bleeding all over again. Re-apply pressure for 10 minutes if necessary to stop bleeding.  Use additional sterile gauze and tape to maintain pressure once bleeding has stopped.  If bleeding continues, then call back to clinic at [phone].    Signs of Infection:  Infection can occur in any area where skin has been disrupted.  If you notice persistent redness, swelling, colored drainage, increasing pain, fever or other signs of infection, please call us at: [phone] and ask to have me or my colleague paged. We will call you back to discuss.    Pathology Results:  You will be notified, generally via letter or MyChart, in approximately 10 days. If there is anything we need to discuss or further treatment needed, I will call you to discuss it.    PAIN CONTROL INSTRUCTIONS    First, try either acetaminophen (Tylenol), or NSAID ibuprofen (Advil, Dandre, etc), or NSAID naproxen (Aleve, " "Naprosyn)    Acetaminophen dosage : one 325-500 mg tablet taken every 4-6 hours with a maximum of 4000 mg/day = 4 g/day    Ibuprofen dosage : one 600 mg tablet taken every 4-6 hours with a maximum of 4-6 tablets/day    Naproxen dosage : one 500 mg tablet taken twice daily with a maximum of 2 tablets/day     Second, try combining acetaminophen and an NSAID - often the combination will work better then either one alone.    SUN PROTECTION INSTRUCTIONS  Sun damage can lead to skin cancer and premature aging of the skin.      The best way to protect from sun damage to your skin is to avoid the sun during peak hours (10 am - 2 pm) even on overcast days.    Never use tanning beds. Tanning beds are associated with much higher risks of skin cancer.    All tanning damages the skin. Aim for ivory skin year round and you will have less trouble with your skin in years to come. There is no merit in getting \"a base tan\" before a warm weather vacation, as any tanning indicates your body's response to sun damage.    Stop smoking. Smokers have higher rates of skin cancer and also have premature skin wrinkling.    Use UPF sun-protective clothing, which while more expensive initially provides longer lasting coverage without having to worry about remembering to re-apply.  1. Wear a wide-brimmed hat and sunglasses.   2. Wear sun-protective clothing.  [employer] and other [employer] make sun protective clothing that are stylish, comfortable and cool.   [employer] and other [employer] make UV arm sleeves suitable for golfing, gardening and other activities.    Sunscreen instructions:  1. Use sunscreens with Zinc Oxide, Titanium Dioxide or Avobenzone to protect from UVA rays.  2. Use SPF 30-50+ to protect from UVB rays.  3. Re-apply every 2 hours even if water resistant.  4. Apply on your face every day even when cloudy and even in the winter. UVA \"aging rays\" penetrate window glass and are just as strong in the winter as in the " summer.    FYI  You should use about 3 tablespoons of sunscreen to protect your whole body. Thus a typical eight ounce bottle of sunscreen should last 4 applications. Remember, that the SPF rating is compromised if you don t apply enough. Most people only apply 1/2 - 1/3 of the amount they need. Also don t forget areas such as your ears, feet, upper back and harder to reach places. Keep in mind that these amounts should be increased for larger body sizes.    Sunscreens with titanium dioxide and/or zinc oxide in the active ingredients are physical blockers as opposed to chemical blockers. Chemical-free sunscreens should not irritate the skin.    Spray-on sunscreens may be used for touch-up application only, not as a base layer. Also, use with caution around small children due to inhalation risk.    SPF means sun protection factor, which is just the degree to which the sunscreen can protect against UVB rays. There is no rating system for UVA rays. SPF is calculated as the time skin will burn when sunscreen is applied vs. skin without sunscreen.    Water resistant sunscreens should be re-applied every 1-2 hours.    Product Recommendations:    Consider use of sunscreen sticks with Zinc Oxide and Titanium Dioxide active ingredients such as Neutrogena Pure&Free Baby Sunscreen Stick.    Good examples include: Blue Lizard, EltaMD, Solbar    Good daily moisturizers with SPF: Vanicream, CeraVe.    For sensitive skin, consider : SkinMedica Essential Defense Mineral Shield Broad Spectrum SPF 35    Men: consider use of Neutrogena Triple Protect Facial Lotion    Avoid retinyl palmitate products.  Avoid combination products that include both sunscreen and insect repellant, as sunscreen should be applied every 2 hours, but insect repellant should not be applied as frequently.    For more information:  https://www.skincancer.org/prevention/sun-protection/sunscreen/sunscreens-safe-and-effective    SKIN CANCER SELF-EXAM  INSTRUCTIONS  Check every month in the mirror or with a household member. Be aware of any changes, especially bleeding or tenderness. Also, make sure to check your nails for color changes after removal of nail polish.    For melanoma, check for:  A - Asymmetry. One half unlike the other half.  B - Border. Irregular, scalloped, ragged, notched, blurred or poorly defined borders.  C - Color. Color variations from one area to another, with shades of tan, brown and/or black present. Sometimes white, red or blue.  D - Diameter. Greater than 6 mm (about the size of a pencil eraser). Any new growth of a mole should be concerning and be evaluated.  E - Evolving. A mole or skin lesion that looks different from the rest or is changing in size, shape or color.    For basal cell carcinoma and squamous cell carcinoma, check for:    Sores, shiny bumps, nodules, scaly lesions, or wart-like growths that are itchy, tender, crusting, scabbing, eroding, oozing or bleeding.    Open sores/wounds or reddish/irritated areas that do not heal within 2-3 weeks.    Scar-like areas that are white, yellow or waxy in color.    Also, check your lymph nodes for tenderness or swelling every month. Check front of neck, back of neck, over the collarbone, and in the armpits. (Note that if you have a cold or other illness, they may be swollen)    Consider the following resources:    100 Questions & Answers about Melanoma and Other Skin Cancers by Scar Edwards MD (ISBN-13: [id number])    NCCN Guidelines for Patients - Melanoma by the National Comprehensive Cancer Network (NCCN) (ISBN-13 978-5000605520)    Signs of infection (spreading erythema, increasing pain, purulent discharge) were discussed. Limit physical activity for the next several days involving this area. No swimming, keep lesions clean and dry except for showering until the sutures are removed or absorbed. The patient will be notified of the pathology results at the next office  visit or via MyChart or phone. The patient was given written discharge instructions and was discharged in stable condition.    TT: 40 minutes.  CT: 10 minutes.    The information in this document, created by the medical scribe for me, accurately reflects the services I personally performed and the decisions made by me. I have reviewed and approved this document for accuracy prior to leaving the patient care area.  January 21, 2019 11:12 AM  Latoya Hernandes MD  [facility] [place]    Again, thank you for allowing me to participate in the care of your patient.        Sincerely,        Latoya Hernandes MD

## 2019-01-25 LAB — COPATH REPORT: NORMAL

## 2019-01-28 ENCOUNTER — ALLIED HEALTH/NURSE VISIT (OUTPATIENT)
Dept: NURSING | Facility: CLINIC | Age: 54
End: 2019-01-28
Payer: COMMERCIAL

## 2019-01-28 DIAGNOSIS — Z48.00 ENCOUNTER FOR CHANGE OF DRESSING: Primary | ICD-10-CM

## 2019-01-28 PROCEDURE — 99207 ZZC NO CHARGE NURSE ONLY: CPT

## 2019-01-28 NOTE — PROCEDURES
Patient returned to clinic for post surgery 1 week follow up bandage change. Patient has no complaints, denies pain. Bandage removed from  Left inner thihg, area cleansed with wound cleanser. Site is healing and wound edges approximating well. Reapplied new steri strips and paper tape.     Advised to watch for signs and sx of infection; spreading redness, drainage, odor, fever. Call or report promptly to clinic if any of these symptoms are present.  Patient verbalized understanding.   Shannon Renee RN BSN  [facility]  [phone]

## 2019-05-24 ENCOUNTER — ANCILLARY PROCEDURE (OUTPATIENT)
Dept: GENERAL RADIOLOGY | Facility: CLINIC | Age: 54
End: 2019-05-24
Attending: FAMILY MEDICINE
Payer: COMMERCIAL

## 2019-05-24 ENCOUNTER — OFFICE VISIT (OUTPATIENT)
Dept: FAMILY MEDICINE | Facility: CLINIC | Age: 54
End: 2019-05-24
Payer: COMMERCIAL

## 2019-05-24 VITALS
OXYGEN SATURATION: 100 % | WEIGHT: 161 LBS | DIASTOLIC BLOOD PRESSURE: 80 MMHG | HEART RATE: 72 BPM | SYSTOLIC BLOOD PRESSURE: 132 MMHG | BODY MASS INDEX: 29.63 KG/M2 | TEMPERATURE: 98.3 F | HEIGHT: 62 IN

## 2019-05-24 DIAGNOSIS — M25.512 CHRONIC LEFT SHOULDER PAIN: ICD-10-CM

## 2019-05-24 DIAGNOSIS — M25.512 CHRONIC LEFT SHOULDER PAIN: Primary | ICD-10-CM

## 2019-05-24 DIAGNOSIS — K21.9 GASTROESOPHAGEAL REFLUX DISEASE WITHOUT ESOPHAGITIS: ICD-10-CM

## 2019-05-24 DIAGNOSIS — G89.29 CHRONIC LEFT SHOULDER PAIN: Primary | ICD-10-CM

## 2019-05-24 DIAGNOSIS — G89.29 CHRONIC LEFT SHOULDER PAIN: ICD-10-CM

## 2019-05-24 PROCEDURE — 99214 OFFICE O/P EST MOD 30 MIN: CPT | Performed by: FAMILY MEDICINE

## 2019-05-24 PROCEDURE — 73030 X-RAY EXAM OF SHOULDER: CPT | Mod: LT

## 2019-05-24 ASSESSMENT — MIFFLIN-ST. JEOR: SCORE: 1289.89

## 2019-05-24 NOTE — PROGRESS NOTES
Subjective     Kristen Bernstein is a 53 year old female who presents to clinic today for the following health issues:    HPI   Joint Pain    Onset: ongoing since January    Description:   Location: left shoulder  Character: Sharp    Intensity: mild    Progression of Symptoms: worse    Accompanying Signs & Symptoms:  Other symptoms: numbness and when reaching to far over her head the she will get shooting pains.  Unable to sleep on her left side due to the pain    History:   Previous similar pain: YES      Precipitating factors:   Trauma or overuse: YES- over use    Alleviating factors:  Improved by: heat    Therapies Tried and outcome: heat      Complain of heartburn.  Denies any nausea or vomiting.  Denies any dysphasia.  Denies any dark stool or blood in stool.  Bowel movements are normal.  Denies any abdominal pain in the lower parts.    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      UPPER GI ENDOSCOPY  3/2006    [facility]       Social History     Tobacco Use     Smoking status: Never Smoker     Smokeless tobacco: Never Used   Substance Use Topics     Alcohol use: Yes     Alcohol/week: 0.0 oz     Comment: 1/week     Family History   Problem Relation Age of Onset      "Diabetes Father      Hypertension Father      Colon Cancer Father      Hypertension Maternal Grandmother      Heart Failure Maternal Grandmother      Diabetes Maternal Uncle      Lung Cancer Maternal Uncle          Current Outpatient Medications   Medication Sig Dispense Refill     betamethasone dipropionate (DIPROSONE) 0.05 % external cream Apply topically 2 times daily for 10-14 days to AA on neck/shoulders. Never for use on face nor groin. 45 g 1     calcium gluconate 500 MG tablet Take 500 mg by mouth daily       diclofenac (VOLTAREN) 1 % topical gel Place onto the skin 2 times daily as needed for moderate pain 50 g 0     losartan (COZAAR) 100 MG tablet Take 1 tablet (100 mg) by mouth daily 90 tablet 3     ranitidine (ZANTAC) 150 MG tablet Take 150 mg by mouth 2 times daily       No Known Allergies      Reviewed and updated as needed this visit by Provider         Review of Systems   ROS COMP: CONSTITUTIONAL: NEGATIVE for fever, chills, change in weight  ENT/MOUTH: NEGATIVE for ear, mouth and throat problems  RESP: NEGATIVE for significant cough or SOB  CV: NEGATIVE for chest pain, palpitations or peripheral edema      Objective    /80   Pulse 72   Temp 98.3  F (36.8  C) (Tympanic)   Ht 1.585 m (5' 2.4\")   Wt 73 kg (161 lb)   SpO2 100%   BMI 29.07 kg/m    Body mass index is 29.07 kg/m .  Physical Exam   GENERAL: healthy, alert and no distress  NECK: no adenopathy, no asymmetry, masses, or scars and thyroid normal to palpation  RESP: lungs clear to auscultation - no rales, rhonchi or wheezes  CV: regular rate and rhythm, normal S1 S2, no S3 or S4, no murmur, click or rub, no peripheral edema and peripheral pulses strong  ABDOMEN: soft, nontender, no hepatosplenomegaly, no masses and bowel sounds normal  MS: no gross musculoskeletal defects noted, no edema            Assessment & Plan     1. Chronic left shoulder pain    - XR Shoulder Left G/E 3 Views; ordered and reviewed myself.  No abnormality " "noted.  No fracture noted.  Recommending to proceed with physical therapy for chronic left shoulder pain.  If with physical therapy symptoms are not improving in the next 4 to 6 weeks, we may need to proceed with an MRI of the shoulder.    - [facility]CTIC REFERRAL; Future    2. Gastroesophageal reflux disease without esophagitis  Recommending trial of over-the-counter PPI.  Dietary modification discussed.  If symptoms are not improving the next few weeks, instructed to notify me back       BMI:   Estimated body mass index is 29.07 kg/m  as calculated from the following:    Height as of this encounter: 1.585 m (5' 2.4\").    Weight as of this encounter: 73 kg (161 lb).               Return in about 7 months (around 12/24/2019) for Annual Physical Exam.    Evelin Soto MD  [facility] [place]      "

## 2019-05-28 NOTE — RESULT ENCOUNTER NOTE
Please call the patient to notify patient that the shoulder joint is normal on x-ray.  Physical therapy ordered.    Evelin Soto MD

## 2019-06-04 ENCOUNTER — THERAPY VISIT (OUTPATIENT)
Dept: PHYSICAL THERAPY | Facility: CLINIC | Age: 54
End: 2019-06-04
Payer: COMMERCIAL

## 2019-06-04 DIAGNOSIS — M75.02 ADHESIVE CAPSULITIS OF LEFT SHOULDER: ICD-10-CM

## 2019-06-04 DIAGNOSIS — M25.512 CHRONIC LEFT SHOULDER PAIN: ICD-10-CM

## 2019-06-04 DIAGNOSIS — G89.29 CHRONIC LEFT SHOULDER PAIN: ICD-10-CM

## 2019-06-04 PROCEDURE — 97140 MANUAL THERAPY 1/> REGIONS: CPT | Mod: GP | Performed by: PHYSICAL THERAPIST

## 2019-06-04 PROCEDURE — 97161 PT EVAL LOW COMPLEX 20 MIN: CPT | Mod: GP | Performed by: PHYSICAL THERAPIST

## 2019-06-04 PROCEDURE — 97110 THERAPEUTIC EXERCISES: CPT | Mod: GP | Performed by: PHYSICAL THERAPIST

## 2019-06-04 NOTE — PROGRESS NOTES
[facility] Initial Evaluation  Subjective:  Patient is referred with a 6 month hx of L shoulder pain and limited ROM. She  Noted onset of sxs after receiving the shingles vaccination. She notes pain with reaching above shoulder level, reaching behind the back and when lying on L side.    The history is provided by the patient.   Kristen Bernstein is a 53 year old female with a left shoulder condition.  Occurance: shingles vaccination.  Condition occurred: in the community.  This is a new condition     Patient reports pain:  In the joint.  Radiates to:  Upper arm.  Pain is described as aching and is intermittent   Associated symptoms:  Loss of motion/stiffness. Pain is the same all the time.  Symptoms are exacerbated by certain positions, using arm behind back, lying on extremity, using arm at shoulder level and using arm overhead and relieved by rest.  Since onset symptoms are unchanged.        General health as reported by patient is good.  Pertinent medical history includes:  Depression, high blood pressure and menopausal.      Current medications:  High blood pressure medication.    Patient is working in normal job without restrictions.  Primary job tasks include:  Prolonged sitting and repetitive tasks.    Barriers include:  None as reported by the patient.    Red flags:  None as reported by the patient.                        Objective:  System                   Shoulder Evaluation:  ROM:  AROM:    Flexion:  Left:  106    Right:  140    Abduction:  Left: 100   Right:  140                      PROM:    Flexion:  Left:  114    Right: 155      Abduction:  Left:  100    Right:  155    Internal Rotation:  Left:  42    Right:  68  External Rotation:  Left:  46    Right:  95                    Strength:  normal                      Stability Testing:  not assessed      Special Tests:  not assessed      Palpation:  normal      Mobility Tests:    Glenohumeral anterior left:  Hypomobile  Glenohumeral  anterior right:  Normal  Glenohumeral posterior left:  Hypomobile  Glenohumeral posterior right:  Normal  Glenohumeral inferior left:  Hypomobile  Glenohumeral inferior right:  Normal                                             General     ROS    Assessment/Plan:    Patient is a 53 year old female with left side shoulder complaints.    Patient has the following significant findings with corresponding treatment plan.                Diagnosis 1:  Left shoulder adhesive capsulitis  Pain -  hot/cold therapy, manual therapy, self management, education and home program  Decreased ROM/flexibility - manual therapy, therapeutic exercise and home program  Decreased joint mobility - manual therapy, therapeutic exercise and home program  Decreased function - therapeutic activities and home program    Therapy Evaluation Codes:   1) History comprised of:   Personal factors that impact the plan of care:      Time since onset of symptoms.    Comorbidity factors that impact the plan of care are:      None.     Medications impacting care: None.  2) Examination of Body Systems comprised of:   Body structures and functions that impact the plan of care:      Shoulder.   Activity limitations that impact the plan of care are:      Dressing, Sleeping and reaching.  3) Clinical presentation characteristics are:   Stable/Uncomplicated.  4) Decision-Making    Low complexity using standardized patient assessment instrument and/or measureable assessment of functional outcome.  Cumulative Therapy Evaluation is: Low complexity.    Previous and current functional limitations:  (See Goal Flow Sheet for this information)    Short term and Long term goals: (See Goal Flow Sheet for this information)     Communication ability:  Patient appears to be able to clearly communicate and understand verbal and written communication and follow directions correctly.  Treatment Explanation - The following has been discussed with the patient:   RX ordered/plan of  care  Anticipated outcomes  Possible risks and side effects  This patient would benefit from PT intervention to resume normal activities.   Rehab potential is good.    Frequency:  1-2 X week, once daily  Duration:  for 4 weeks  Discharge Plan:  Achieve all LTG.  Independent in home treatment program.  Reach maximal therapeutic benefit.    Please refer to the daily flowsheet for treatment today, total treatment time and time spent performing 1:1 timed codes.

## 2019-06-11 ENCOUNTER — THERAPY VISIT (OUTPATIENT)
Dept: PHYSICAL THERAPY | Facility: CLINIC | Age: 54
End: 2019-06-11
Payer: COMMERCIAL

## 2019-06-11 DIAGNOSIS — M75.02 ADHESIVE CAPSULITIS OF LEFT SHOULDER: ICD-10-CM

## 2019-06-11 PROCEDURE — 97110 THERAPEUTIC EXERCISES: CPT | Mod: GP | Performed by: PHYSICAL THERAPIST

## 2019-06-11 PROCEDURE — 97140 MANUAL THERAPY 1/> REGIONS: CPT | Mod: GP | Performed by: PHYSICAL THERAPIST

## 2019-06-18 ENCOUNTER — THERAPY VISIT (OUTPATIENT)
Dept: PHYSICAL THERAPY | Facility: CLINIC | Age: 54
End: 2019-06-18
Payer: COMMERCIAL

## 2019-06-18 DIAGNOSIS — M75.02 ADHESIVE CAPSULITIS OF LEFT SHOULDER: ICD-10-CM

## 2019-06-18 PROCEDURE — 97110 THERAPEUTIC EXERCISES: CPT | Mod: GP | Performed by: PHYSICAL THERAPIST

## 2019-06-18 PROCEDURE — 97140 MANUAL THERAPY 1/> REGIONS: CPT | Mod: GP | Performed by: PHYSICAL THERAPIST

## 2019-07-02 ENCOUNTER — THERAPY VISIT (OUTPATIENT)
Dept: PHYSICAL THERAPY | Facility: CLINIC | Age: 54
End: 2019-07-02
Payer: COMMERCIAL

## 2019-07-02 DIAGNOSIS — M75.02 ADHESIVE CAPSULITIS OF LEFT SHOULDER: ICD-10-CM

## 2019-07-02 PROCEDURE — 97140 MANUAL THERAPY 1/> REGIONS: CPT | Mod: GP | Performed by: PHYSICAL THERAPIST

## 2019-07-02 PROCEDURE — 97110 THERAPEUTIC EXERCISES: CPT | Mod: GP | Performed by: PHYSICAL THERAPIST

## 2019-07-11 ENCOUNTER — THERAPY VISIT (OUTPATIENT)
Dept: PHYSICAL THERAPY | Facility: CLINIC | Age: 54
End: 2019-07-11
Payer: COMMERCIAL

## 2019-07-11 DIAGNOSIS — M75.02 ADHESIVE CAPSULITIS OF LEFT SHOULDER: ICD-10-CM

## 2019-07-11 PROCEDURE — 97110 THERAPEUTIC EXERCISES: CPT | Mod: GP | Performed by: PHYSICAL THERAPIST

## 2019-07-11 PROCEDURE — 97140 MANUAL THERAPY 1/> REGIONS: CPT | Mod: GP | Performed by: PHYSICAL THERAPIST

## 2019-07-16 ENCOUNTER — THERAPY VISIT (OUTPATIENT)
Dept: PHYSICAL THERAPY | Facility: CLINIC | Age: 54
End: 2019-07-16
Payer: COMMERCIAL

## 2019-07-16 DIAGNOSIS — M75.02 ADHESIVE CAPSULITIS OF LEFT SHOULDER: ICD-10-CM

## 2019-07-16 PROCEDURE — 97110 THERAPEUTIC EXERCISES: CPT | Mod: GP | Performed by: PHYSICAL THERAPIST

## 2019-07-16 PROCEDURE — 97140 MANUAL THERAPY 1/> REGIONS: CPT | Mod: GP | Performed by: PHYSICAL THERAPIST

## 2019-09-12 PROBLEM — M75.02 ADHESIVE CAPSULITIS OF LEFT SHOULDER: Status: RESOLVED | Noted: 2019-06-04 | Resolved: 2019-09-12

## 2019-10-15 ENCOUNTER — OFFICE VISIT (OUTPATIENT)
Dept: FAMILY MEDICINE | Facility: CLINIC | Age: 54
End: 2019-10-15
Payer: COMMERCIAL

## 2019-10-15 ENCOUNTER — TELEPHONE (OUTPATIENT)
Dept: FAMILY MEDICINE | Facility: CLINIC | Age: 54
End: 2019-10-15

## 2019-10-15 VITALS
HEIGHT: 64 IN | DIASTOLIC BLOOD PRESSURE: 70 MMHG | TEMPERATURE: 99.1 F | OXYGEN SATURATION: 99 % | BODY MASS INDEX: 27.14 KG/M2 | WEIGHT: 159 LBS | HEART RATE: 89 BPM | SYSTOLIC BLOOD PRESSURE: 122 MMHG

## 2019-10-15 DIAGNOSIS — J20.8 ACUTE BRONCHITIS DUE TO OTHER SPECIFIED ORGANISMS: Primary | ICD-10-CM

## 2019-10-15 PROCEDURE — 99213 OFFICE O/P EST LOW 20 MIN: CPT | Performed by: FAMILY MEDICINE

## 2019-10-15 RX ORDER — ESOMEPRAZOLE MAGNESIUM 40 MG/1
40 CAPSULE, DELAYED RELEASE ORAL
COMMUNITY
End: 2021-01-08

## 2019-10-15 RX ORDER — AZITHROMYCIN 250 MG/1
TABLET, FILM COATED ORAL
Qty: 6 TABLET | Refills: 0 | Status: SHIPPED | OUTPATIENT
Start: 2019-10-15 | End: 2019-11-01

## 2019-10-15 ASSESSMENT — MIFFLIN-ST. JEOR: SCORE: 1301.22

## 2019-10-15 NOTE — PROGRESS NOTES
Subjective     Kristen Bernstein is a 54 year old female who presents to clinic today for the following health issues:    HPI   Acute Illness   Acute illness concerns: cough  Onset: x over one week    Fever: no    Chills/Sweats: no    Headache (location?): no    Sinus Pressure: little bit     Conjunctivitis:  no    Ear Pain: no    Rhinorrhea: no    Congestion: yes - nose    Sore Throat: yes- but better now     Cough: YES-non-productive    Wheeze: no    Decreased Appetite: no    Nausea: no    Vomiting: no    Diarrhea:  no    Dysuria/Freq.: no    Fatigue/Achiness: no    Sick/Strep Exposure: no     Therapies Tried and outcome: nyquil, mucinex, cepacal, sudafed, advil        Patient Active Problem List   Diagnosis     Hypertension goal BP (blood pressure) < 140/90     Family history of colon cancer     GERD (gastroesophageal reflux disease)     CARDIOVASCULAR SCREENING; LDL GOAL LESS THAN 130     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      UPPER GI ENDOSCOPY  3/2006    [facility]       Social History     Tobacco Use     Smoking status: Never Smoker     Smokeless tobacco: Never Used   Substance Use Topics     Alcohol use: Yes     Alcohol/week: 0.0 standard drinks     Comment: 1/week     Family History   Problem Relation Age of Onset     Diabetes Father      Hypertension Father      Colon Cancer Father      Hypertension Maternal Grandmother      Heart Failure Maternal Grandmother      Diabetes  "Maternal Uncle      Lung Cancer Maternal Uncle          Current Outpatient Medications   Medication Sig Dispense Refill     azithromycin (ZITHROMAX) 250 MG tablet Two tablets first day, then one tablet daily for four days. 6 tablet 0     betamethasone dipropionate (DIPROSONE) 0.05 % external cream Apply topically 2 times daily for 10-14 days to AA on neck/shoulders. Never for use on face nor groin. 45 g 1     diclofenac (VOLTAREN) 1 % topical gel Place onto the skin 2 times daily as needed for moderate pain 50 g 0     esomeprazole (NEXIUM) 40 MG DR capsule Take 40 mg by mouth every morning (before breakfast) Take 30-60 minutes before eating.       losartan (COZAAR) 100 MG tablet Take 1 tablet (100 mg) by mouth daily 90 tablet 3     calcium gluconate 500 MG tablet Take 500 mg by mouth daily       ranitidine (ZANTAC) 150 MG tablet Take 150 mg by mouth 2 times daily       No Known Allergies      Reviewed and updated as needed this visit by Provider         Review of Systems   ROS COMP: INTEGUMENTARY/SKIN: NEGATIVE for worrisome rashes, moles or lesions  GI: NEGATIVE for nausea, abdominal pain, heartburn, or change in bowel habits      Objective    /70   Pulse 89   Temp 99.1  F (37.3  C) (Tympanic)   Ht 1.626 m (5' 4\")   Wt 72.1 kg (159 lb)   SpO2 99%   BMI 27.29 kg/m    Body mass index is 27.29 kg/m .  Physical Exam   GENERAL: healthy, alert and no distress  HENT: ear canals and TM's normal, nose and mouth without ulcers or lesions  NECK: no adenopathy, no asymmetry, masses, or scars and thyroid normal to palpation  RESP: No wheezing.  Occasional diffuse rhonchi.  CV: regular rate and rhythm, normal S1 S2, no S3 or S4, no murmur, click or rub, no peripheral edema and peripheral pulses strong            Assessment & Plan     1. Acute bronchitis due to other specified organisms  Started patient on Z-Eusebio for acute bronchitis.  Recommended to stay hydrated.  - azithromycin (ZITHROMAX) 250 MG tablet; Two tablets " "first day, then one tablet daily for four days.  Dispense: 6 tablet; Refill: 0     BMI:   Estimated body mass index is 27.29 kg/m  as calculated from the following:    Height as of this encounter: 1.626 m (5' 4\").    Weight as of this encounter: 72.1 kg (159 lb).           Return in about 5 days (around 10/20/2019) for If symptoms are not improving.    Evelin Soto MD  [facility] [place]      "

## 2019-10-15 NOTE — TELEPHONE ENCOUNTER
Reason for Call:  Call medication  In to  [facility] pharmacy     Detailed comments:  azithromycin (ZITHROMAX)    Phone Number Patient can be reached at:  [phone]    Best Time:any    Can we leave a detailed message on this number? Yes      Call taken on 10/15/2019 at 2:12 PM by Cee Kimbrough

## 2019-10-15 NOTE — TELEPHONE ENCOUNTER
Called pharmacist to give verbal order for medication Zithromax 250 mg tablet:  Two tablets first day, then one tablet daily for four days: 6 tablets with 0 refill.     Called patient to  inform her that verbal order was given and she can  her prescription that the [facility] pharmacy. Patient verbalized understanding and agrees with plan.     Nakita Diez RN, BSN  [facility]

## 2019-11-01 ENCOUNTER — OFFICE VISIT (OUTPATIENT)
Dept: FAMILY MEDICINE | Facility: CLINIC | Age: 54
End: 2019-11-01
Payer: COMMERCIAL

## 2019-11-01 VITALS
TEMPERATURE: 97.3 F | WEIGHT: 164 LBS | SYSTOLIC BLOOD PRESSURE: 124 MMHG | HEIGHT: 64 IN | HEART RATE: 70 BPM | DIASTOLIC BLOOD PRESSURE: 80 MMHG | BODY MASS INDEX: 28 KG/M2 | OXYGEN SATURATION: 96 %

## 2019-11-01 DIAGNOSIS — H93.8X9 SENSATION OF PLUGGED EAR, UNSPECIFIED LATERALITY: Primary | ICD-10-CM

## 2019-11-01 PROCEDURE — 99213 OFFICE O/P EST LOW 20 MIN: CPT | Performed by: FAMILY MEDICINE

## 2019-11-01 ASSESSMENT — MIFFLIN-ST. JEOR: SCORE: 1323.9

## 2019-11-01 NOTE — PROGRESS NOTES
"Subjective     Kristen Bernstein is a 54 year old female who presents to clinic today for the following health issues:    HPI   Concern - Plugged ears   Onset: x beginning of October     Description:   Left plugged ear   Recently saw Charles for bronchitis and thinks it \"popped\" because of coughing           Reviewed and updated as needed this visit by Provider         Review of Systems   ROS COMP: Constitutional, HEENT, cardiovascular, pulmonary, gi and gu systems are negative, except as otherwise noted.      Objective    There were no vitals taken for this visit.  There is no height or weight on file to calculate BMI.  Physical Exam   GENERAL: healthy, alert and no distress  NECK: no adenopathy, no asymmetry, masses, or scars and thyroid normal to palpation  RESP: lungs clear to auscultation - no rales, rhonchi or wheezes  CV: regular rate and rhythm, normal S1 S2, no S3 or S4, no murmur, click or rub, no peripheral edema and peripheral pulses strong  Ear exam is normal        Assessment & Plan     1. Sensation of plugged ear, unspecified laterality  Patient has a plugged ear sensation however her exam is completely normal.  This could be slight fluid behind the ear but does not seem like any infection.  I suggested she can try antihistamine like Allegra to see if that helps.  Continue blowing your nose to help mobilize some fluid.  Most of these symptoms resolved spontaneously without any further evaluation.       BMI:   Estimated body mass index is 28.15 kg/m  as calculated from the following:    Height as of this encounter: 1.626 m (5' 4\").    Weight as of this encounter: 74.4 kg (164 lb).     Keshawn Eagle MD  [facility]    "

## 2020-01-03 ENCOUNTER — OFFICE VISIT (OUTPATIENT)
Dept: FAMILY MEDICINE | Facility: CLINIC | Age: 55
End: 2020-01-03
Payer: COMMERCIAL

## 2020-01-03 VITALS
HEART RATE: 71 BPM | SYSTOLIC BLOOD PRESSURE: 132 MMHG | TEMPERATURE: 98 F | OXYGEN SATURATION: 98 % | DIASTOLIC BLOOD PRESSURE: 82 MMHG | HEIGHT: 63 IN | WEIGHT: 165 LBS | BODY MASS INDEX: 29.23 KG/M2

## 2020-01-03 DIAGNOSIS — Z00.00 ROUTINE GENERAL MEDICAL EXAMINATION AT A HEALTH CARE FACILITY: Primary | ICD-10-CM

## 2020-01-03 DIAGNOSIS — Z23 NEED FOR VACCINATION: ICD-10-CM

## 2020-01-03 DIAGNOSIS — I10 HYPERTENSION GOAL BP (BLOOD PRESSURE) < 140/90: ICD-10-CM

## 2020-01-03 LAB
ALBUMIN SERPL-MCNC: 3.6 G/DL (ref 3.4–5)
ALP SERPL-CCNC: 107 U/L (ref 40–150)
ALT SERPL W P-5'-P-CCNC: 24 U/L (ref 0–50)
ANION GAP SERPL CALCULATED.3IONS-SCNC: 5 MMOL/L (ref 3–14)
AST SERPL W P-5'-P-CCNC: 20 U/L (ref 0–45)
BILIRUB SERPL-MCNC: 0.5 MG/DL (ref 0.2–1.3)
BUN SERPL-MCNC: 18 MG/DL (ref 7–30)
CALCIUM SERPL-MCNC: 8.9 MG/DL (ref 8.5–10.1)
CHLORIDE SERPL-SCNC: 108 MMOL/L (ref 94–109)
CHOLEST SERPL-MCNC: 179 MG/DL
CO2 SERPL-SCNC: 27 MMOL/L (ref 20–32)
CREAT SERPL-MCNC: 0.96 MG/DL (ref 0.52–1.04)
GFR SERPL CREATININE-BSD FRML MDRD: 66 ML/MIN/{1.73_M2}
GLUCOSE SERPL-MCNC: 97 MG/DL (ref 70–99)
HDLC SERPL-MCNC: 50 MG/DL
LDLC SERPL CALC-MCNC: 108 MG/DL
NONHDLC SERPL-MCNC: 129 MG/DL
POTASSIUM SERPL-SCNC: 4 MMOL/L (ref 3.4–5.3)
PROT SERPL-MCNC: 7 G/DL (ref 6.8–8.8)
SODIUM SERPL-SCNC: 140 MMOL/L (ref 133–144)
TRIGL SERPL-MCNC: 106 MG/DL

## 2020-01-03 PROCEDURE — 99396 PREV VISIT EST AGE 40-64: CPT | Mod: 25 | Performed by: FAMILY MEDICINE

## 2020-01-03 PROCEDURE — 90471 IMMUNIZATION ADMIN: CPT | Performed by: FAMILY MEDICINE

## 2020-01-03 PROCEDURE — 36415 COLL VENOUS BLD VENIPUNCTURE: CPT | Performed by: FAMILY MEDICINE

## 2020-01-03 PROCEDURE — 80061 LIPID PANEL: CPT | Performed by: FAMILY MEDICINE

## 2020-01-03 PROCEDURE — 90714 TD VACC NO PRESV 7 YRS+ IM: CPT | Performed by: FAMILY MEDICINE

## 2020-01-03 PROCEDURE — 80053 COMPREHEN METABOLIC PANEL: CPT | Performed by: FAMILY MEDICINE

## 2020-01-03 RX ORDER — LOSARTAN POTASSIUM 100 MG/1
TABLET ORAL
Qty: 90 TABLET | Refills: 3 | Status: SHIPPED | OUTPATIENT
Start: 2020-01-03 | End: 2021-01-08

## 2020-01-03 ASSESSMENT — MIFFLIN-ST. JEOR: SCORE: 1306.18

## 2020-01-03 NOTE — PROGRESS NOTES
SUBJECTIVE:   CC: Kristen Bernstein is an 54 year old woman who presents for preventive health visit.     Healthy Habits:    Do you get at least three servings of calcium containing foods daily (dairy, green leafy vegetables, etc.)? yes    Amount of exercise or daily activities, outside of work: 3-4 day(s) per week    Problems taking medications regularly No    Medication side effects: No    Have you had an eye exam in the past two years? yes    Do you see a dentist twice per year? yes    Do you have sleep apnea, excessive snoring or daytime drowsiness?no      Hypertension Follow-up      Do you check your blood pressure regularly outside of the clinic? No     Are you following a low salt diet? Yes    Are your blood pressures ever more than 140 on the top number (systolic) OR more   than 90 on the bottom number (diastolic), for example 140/90? No      Today's PHQ-2 Score:   PHQ-2 ( 1999 [employer]) 10/15/2019 5/24/2019   Q1: Little interest or pleasure in doing things 0 0   Q2: Feeling down, depressed or hopeless 0 0   PHQ-2 Score 0 0   Q1: Little interest or pleasure in doing things - -   Q2: Feeling down, depressed or hopeless - -   PHQ-2 Score - -       Abuse: Current or Past(Physical, Sexual or Emotional)- No  Do you feel safe in your environment? Yes    Have you ever done Advance Care Planning? (For example, a Health Directive, POLST, or a discussion with a medical provider or your loved ones about your wishes): No, advance care planning information given to patient to review.  Patient plans to discuss their wishes with loved ones or provider.      Social History     Tobacco Use     Smoking status: Never Smoker     Smokeless tobacco: Never Used   Substance Use Topics     Alcohol use: Yes     Alcohol/week: 0.0 standard drinks     Comment: 1/week     If you drink alcohol do you typically have >3 drinks per day or >7 drinks per week? No                     Reviewed orders with patient.  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      UPPER GI ENDOSCOPY  3/2006    [facility]       Social History     Tobacco Use     Smoking status: Never Smoker     Smokeless tobacco: Never Used   Substance Use Topics     Alcohol use: Yes     Alcohol/week: 0.0 standard drinks     Comment: 1/week     Family History   Problem Relation Age of Onset     Diabetes Father      Hypertension Father      Colon Cancer Father      Hypertension Maternal Grandmother      Heart Failure Maternal Grandmother      Diabetes Maternal Uncle      Lung Cancer Maternal Uncle          Current Outpatient Medications   Medication Sig Dispense Refill     betamethasone dipropionate (DIPROSONE) 0.05 % external cream Apply topically 2 times daily for 10-14 days to AA on neck/shoulders. Never for use on face nor groin. 45 g 1     esomeprazole (NEXIUM) 40 MG DR capsule Take 40 mg by mouth every morning (before breakfast) Take 30-60 minutes before  eating.       losartan (COZAAR) 100 MG tablet Take 1 tablet (100 mg) by mouth daily 90 tablet 3     No Known Allergies    Mammogram Screening: Patient over age 50, mutual decision to screen reflected in health maintenance.    Pertinent mammograms are reviewed under the imaging tab.  History of abnormal Pap smear: NO - age 30-65 PAP every 5 years with negative HPV co-testing recommended  PAP / HPV Latest Ref Rng & Units 11/12/2015 9/5/2012 9/13/2010   PAP - NIL NIL NIL   HPV 16 DNA NEG Negative - -   HPV 18 DNA NEG Negative - -   OTHER HR HPV NEG Negative - -     Reviewed and updated as needed this visit by clinical staff         Reviewed and updated as needed this visit by Provider            ROS:  CONSTITUTIONAL: NEGATIVE for fever, chills, change in weight  INTEGUMENTARY/SKIN: NEGATIVE for worrisome rashes, moles or lesions  EYES: NEGATIVE for vision changes or irritation  ENT: NEGATIVE for ear, mouth and throat problems  RESP: NEGATIVE for significant cough or SOB  BREAST: NEGATIVE for masses, tenderness or discharge  CV: NEGATIVE for chest pain, palpitations or peripheral edema  GI: NEGATIVE for nausea, abdominal pain, heartburn, or change in bowel habits  : NEGATIVE for unusual urinary or vaginal symptoms. No vaginal bleeding.  MUSCULOSKELETAL: NEGATIVE for significant arthralgias or myalgia  NEURO: NEGATIVE for weakness, dizziness or paresthesias  PSYCHIATRIC: NEGATIVE for changes in mood or affect     OBJECTIVE:   There were no vitals taken for this visit.  EXAM:  GENERAL APPEARANCE: healthy, alert and no distress  EYES: Eyes grossly normal to inspection, PERRL and conjunctivae and sclerae normal  HENT: ear canals and TM's normal, nose and mouth without ulcers or lesions, oropharynx clear and oral mucous membranes moist  NECK: no adenopathy, no asymmetry, masses, or scars and thyroid normal to palpation  RESP: lungs clear to auscultation - no rales, rhonchi or wheezes  BREAST: normal without masses,  "tenderness or nipple discharge and no palpable axillary masses or adenopathy  CV: regular rate and rhythm, normal S1 S2, no S3 or S4, no murmur, click or rub, no peripheral edema and peripheral pulses strong  ABDOMEN: soft, nontender, no hepatosplenomegaly, no masses and bowel sounds normal  MS: no musculoskeletal defects are noted and gait is age appropriate without ataxia  SKIN: no suspicious lesions or rashes  NEURO: Normal strength and tone, sensory exam grossly normal, mentation intact and speech normal  PSYCH: mentation appears normal and affect normal/bright        ASSESSMENT/PLAN:   1. Routine general medical examination at a health care facility      2. Hypertension goal BP (blood pressure) < 140/90  Well-controlled.  Resume current medication.  Await the fasting blood work results.  - losartan (COZAAR) 100 MG tablet; Take 1 tablet (100 mg) by mouth daily  Dispense: 90 tablet; Refill: 3  - Lipid panel reflex to direct LDL Fasting  - Comprehensive metabolic panel    3. Need for vaccination  Tetanus vaccine ordered.      COUNSELING:   Reviewed preventive health counseling, as reflected in patient instructions       Regular exercise       Healthy diet/nutrition    Estimated body mass index is 28.15 kg/m  as calculated from the following:    Height as of 11/1/19: 1.626 m (5' 4\").    Weight as of 11/1/19: 74.4 kg (164 lb).    Weight management plan: Discussed healthy diet and exercise guidelines     reports that she has never smoked. She has never used smokeless tobacco.      Counseling Resources:  ATP IV Guidelines  Pooled Cohorts Equation Calculator  Breast Cancer Risk Calculator  FRAX Risk Assessment  ICSI Preventive Guidelines  Dietary Guidelines for Americans, 2010  USDA's MyPlate  ASA Prophylaxis  Lung CA Screening    Evelin Soto MD  [facility] [place]  "

## 2020-01-29 NOTE — PATIENT INSTRUCTIONS
"FUTURE APPOINTMENTS  Follow up in 1 year(s) for full-body skin cancer screening.     SUN PROTECTION INSTRUCTIONS  Sun damage can lead to skin cancer and premature aging of the skin.      The best way to protect from sun damage to your skin is to avoid the sun during peak hours (10 am - 2 pm) even on overcast days.    Never use tanning beds. Tanning beds are associated with much higher risks of skin cancer.    All tanning damages the skin. Aim for ivory skin year round and you will have less trouble with your skin in years to come. There is no merit in getting \"a base tan\" before a warm weather vacation, as any tanning indicates your body's response to sun damage.    Stop smoking. Smokers have higher rates of skin cancer and also have premature skin wrinkling.    Use UPF sun-protective clothing, which while more expensive initially provides longer lasting coverage without having to worry about remembering to re-apply.  1. Wear a wide-brimmed hat and sunglasses.   2. Wear sun-protective clothing.  [employer] and other [employer] make sun protective clothing that are stylish, comfortable and cool.   [employer] and other [employer] make UV arm sleeves suitable for golfing, gardening and other activities.    Sunscreen instructions:  1. Use sunscreens with Zinc Oxide, Titanium Dioxide or Avobenzone to protect from UVA rays.  2. Use SPF 30-50+ to protect from UVB rays.  3. Re-apply every 2 hours even if water resistant.  4. Apply on your face every day even when cloudy and even in the winter. UVA \"aging rays\" penetrate window glass and are just as strong in the winter as in the summer.    FYI  You should use about 3 tablespoons of sunscreen to protect your whole body. Thus a typical eight ounce bottle of sunscreen should last 4 applications. Remember, that the SPF rating is compromised if you don t apply enough. Most people only apply 1/2 - 1/3 of the amount they need. Also don t forget areas such as your ears, " feet, upper back and harder to reach places. Keep in mind that these amounts should be increased for larger body sizes.    Sunscreens with titanium dioxide and/or zinc oxide in the active ingredients are physical blockers as opposed to chemical blockers. Chemical-free sunscreens should not irritate the skin.    Spray-on sunscreens may be used for touch-up application only, not as a base layer. Also, use with caution around small children due to inhalation risk.    SPF means sun protection factor, which is just the degree to which the sunscreen can protect against UVB rays. There is no rating system for UVA rays. SPF is calculated as the time skin will burn when sunscreen is applied vs. skin without sunscreen.    Water resistant sunscreens should be re-applied every 1-2 hours.    Product Recommendations:    Consider use of sunscreen sticks with Zinc Oxide and Titanium Dioxide active ingredients such as Neutrogena Pure&Free Baby Sunscreen Stick.    Good examples include: Blue Lizard, EltaMD, Solbar    Good daily moisturizers with SPF: Vanicream, CeraVe.    For sensitive skin, consider : SkinMedica Essential Defense Mineral Shield Broad Spectrum SPF 35    Men: consider use of Neutrogena Triple Protect Facial Lotion    Avoid retinyl palmitate products.  Avoid combination products that include both sunscreen and insect repellant, as sunscreen should be applied every 2 hours, but insect repellant should not be applied as frequently.    For more information:  https://www.skincancer.org/prevention/sun-protection/sunscreen/sunscreens-safe-and-effective    SKIN CANCER SELF-EXAM INSTRUCTIONS  Check every month in the mirror or with a household member. Be aware of any changes, especially bleeding or tenderness. Also, make sure to check your nails for color changes after removal of nail polish.    For melanoma, check for:  A - Asymmetry. One half unlike the other half.  B - Border. Irregular, scalloped, ragged, notched, blurred  or poorly defined borders.  C - Color. Color variations from one area to another, with shades of tan, brown and/or black present. Sometimes white, red or blue.  D - Diameter. Greater than 6 mm (about the size of a pencil eraser). Any new growth of a mole should be concerning and be evaluated.  E - Evolving. A mole or skin lesion that looks different from the rest or is changing in size, shape or color.    For basal cell carcinoma and squamous cell carcinoma, check for:    Sores, shiny bumps, nodules, scaly lesions, or wart-like growths that are itchy, tender, crusting, scabbing, eroding, oozing or bleeding.    Open sores/wounds or reddish/irritated areas that do not heal within 2-3 weeks.    Scar-like areas that are white, yellow or waxy in color.    Also, check your lymph nodes for tenderness or swelling every month. Check front of neck, back of neck, over the collarbone, and in the armpits. (Note that if you have a cold or other illness, they may be swollen)

## 2020-01-29 NOTE — PROGRESS NOTES
[facility] - PRIMARY CARE SKIN    CC: skin cancer screening (full-body)  SUBJECTIVE:   Kristen Bernstein is a(n) 54 year old female who presents to clinic today for a full-body skin exam. History of melanoma in situ.    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  Location: left medial distal thigh.  Date of diagnosis: 1/3/2019.     Personal history of other skin cancer: NO.  Personal history of abnormal (dysplastic) moles: YES - mild dysplastic nevus(i) on left leg.     Personal Medical History  Eczema Psoriasis Autoimmune   NO NO NO   Other: sensitive skin.     Family history of melanoma: NO.  Family history of other skin cancer: NO.  Family history of abnormal (dysplastic) moles: NO.     Family Medical History  Eczema Psoriasis Autoimmune   NO NO NO      Sun Exposure History  Previous history of significant sun exposure: YES     Occupation: (indoor).       Refer to electronic medical record (EMR) for past medical history and medications.    INTEGUMENTARY/SKIN: NEGATIVE for worrisome rashes, moles or lesions  ROS: 14 point review of systems was negative except the symptoms listed above in the HPI.    This document serves as a record of the services and decisions personally performed and made by Latoya Hernandes MD and was created by Chaka Acosta, a trained medical scribe, based on personal observations and provider statements to the medical scribe.  January 30, 2020 12:32 PM   Chaka Acosta      OBJECTIVE:   GENERAL: healthy, alert and no distress.   LYMPH: Cervical, supraclavicular and axillary nodes are normal.  HEENT: PERRL. Conjunctiva, sclera clear.  SKIN: Waddell Skin Type - II.  This patient was examined from the top of the head to the bottom of the feet  including scalp, face, neck,  "trunk, buttocks, both arms, both legs, both hands, both feet, and all fingers and toes. The dermatoscope was used to help evaluate pigmented lesions.  Skin Pertinent Findings:  Face: Scattered brown, macule(s) most consistent with benign solar lentigo    Upper extremities: Scattered brown macules of various sizes and shapes most consistent with (benign) melanocytic nevi. Some brown, macule(s) most consistent with benign solar lentigo.     Anterior lower extremities: Multiple scattered brown macules of various sizes and shapes most consistent with (benign) melanocytic nevi.     Dorsum of the right fourth toe: 3 mm benign brown macule.     Back: Scattered multiple brown macules of various sizes and shapes most consistent with (benign) melanocytic nevi.      ASSESSMENT:     Encounter Diagnoses   Name Primary?     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     SUN PROTECTION INSTRUCTIONS  Sun damage can lead to skin cancer and premature aging of the skin.      The best way to protect from sun damage to your skin is to avoid the sun during peak hours (10 am - 2 pm) even on overcast days.    Never use tanning beds. Tanning beds are associated with much higher risks of skin cancer.    All tanning damages the skin. Aim for ivory skin year round and you will have less trouble with your skin in years to come. There is no merit in getting \"a base tan\" before a warm weather vacation, as any tanning indicates your body's response to sun damage.    Stop smoking. Smokers have higher rates of skin cancer and also have premature skin wrinkling.    Use UPF sun-protective clothing, which while more expensive initially provides longer lasting coverage without " "having to worry about remembering to re-apply.  1. Wear a wide-brimmed hat and sunglasses.   2. Wear sun-protective clothing.  [employer] and other [employer] make sun protective clothing that are stylish, comfortable and cool.   [employer] and other companies make UV arm sleeves suitable for golfing, gardening and other activities.    Sunscreen instructions:  1. Use sunscreens with Zinc Oxide, Titanium Dioxide or Avobenzone to protect from UVA rays.  2. Use SPF 30-50+ to protect from UVB rays.  3. Re-apply every 2 hours even if water resistant.  4. Apply on your face every day even when cloudy and even in the winter. UVA \"aging rays\" penetrate window glass and are just as strong in the winter as in the summer.    FYI  You should use about 3 tablespoons of sunscreen to protect your whole body. Thus a typical eight ounce bottle of sunscreen should last 4 applications. Remember, that the SPF rating is compromised if you don t apply enough. Most people only apply 1/2 - 1/3 of the amount they need. Also don t forget areas such as your ears, feet, upper back and harder to reach places. Keep in mind that these amounts should be increased for larger body sizes.    Sunscreens with titanium dioxide and/or zinc oxide in the active ingredients are physical blockers as opposed to chemical blockers. Chemical-free sunscreens should not irritate the skin.    Spray-on sunscreens may be used for touch-up application only, not as a base layer. Also, use with caution around small children due to inhalation risk.    SPF means sun protection factor, which is just the degree to which the sunscreen can protect against UVB rays. There is no rating system for UVA rays. SPF is calculated as the time skin will burn when sunscreen is applied vs. skin without sunscreen.    Water resistant sunscreens should be re-applied every 1-2 hours.    Product Recommendations:    Consider use of sunscreen sticks with Zinc Oxide and Titanium " Dioxide active ingredients such as Neutrogena Pure&Free Baby Sunscreen Stick.    Good examples include: Blue Lizard, EltaMD, Solbar    Good daily moisturizers with SPF: Vanicream, CeraVe.    For sensitive skin, consider : SkinMedica Essential Defense Mineral Shield Broad Spectrum SPF 35    Men: consider use of Neutrogena Triple Protect Facial Lotion    Avoid retinyl palmitate products.  Avoid combination products that include both sunscreen and insect repellant, as sunscreen should be applied every 2 hours, but insect repellant should not be applied as frequently.    For more information:  https://www.skincancer.org/prevention/sun-protection/sunscreen/sunscreens-safe-and-effective    SKIN CANCER SELF-EXAM INSTRUCTIONS  Check every month in the mirror or with a household member. Be aware of any changes, especially bleeding or tenderness. Also, make sure to check your nails for color changes after removal of nail polish.    For melanoma, check for:  A - Asymmetry. One half unlike the other half.  B - Border. Irregular, scalloped, ragged, notched, blurred or poorly defined borders.  C - Color. Color variations from one area to another, with shades of tan, brown and/or black present. Sometimes white, red or blue.  D - Diameter. Greater than 6 mm (about the size of a pencil eraser). Any new growth of a mole should be concerning and be evaluated.  E - Evolving. A mole or skin lesion that looks different from the rest or is changing in size, shape or color.    For basal cell carcinoma and squamous cell carcinoma, check for:    Sores, shiny bumps, nodules, scaly lesions, or wart-like growths that are itchy, tender, crusting, scabbing, eroding, oozing or bleeding.    Open sores/wounds or reddish/irritated areas that do not heal within 2-3 weeks.    Scar-like areas that are white, yellow or waxy in color.    Also, check your lymph nodes for tenderness or swelling every month. Check front of neck, back of neck, over the  collarbone, and in the armpits. (Note that if you have a cold or other illness, they may be swollen)      The patient was counseled about sunscreens and sun avoidance. The patient was counseled to check the skin regularly and report any lesion that is new, changing, itching, scabbing, bleeding or otherwise bothersome. The patient was discharged ambulatory and in stable condition.      TT: 25 minutes.  CT: 15 minutes.    The information in this document, created by the medical scribe for me, accurately reflects the services I personally performed and the decisions made by me. I have reviewed and approved this document for accuracy prior to leaving the patient care area.  January 30, 2020 12:32 PM  Latoya Hernandes MD  [facility] [place]

## 2020-01-30 ENCOUNTER — OFFICE VISIT (OUTPATIENT)
Dept: FAMILY MEDICINE | Facility: CLINIC | Age: 55
End: 2020-01-30
Payer: COMMERCIAL

## 2020-01-30 VITALS — DIASTOLIC BLOOD PRESSURE: 64 MMHG | SYSTOLIC BLOOD PRESSURE: 122 MMHG

## 2020-01-30 DIAGNOSIS — Z86.006 HISTORY OF MELANOMA IN SITU: Primary | ICD-10-CM

## 2020-01-30 DIAGNOSIS — L81.4 SOLAR LENTIGO: ICD-10-CM

## 2020-01-30 DIAGNOSIS — D22.9 MULTIPLE BENIGN MELANOCYTIC NEVI: ICD-10-CM

## 2020-01-30 DIAGNOSIS — Z12.83 SKIN CANCER SCREENING: ICD-10-CM

## 2020-01-30 PROCEDURE — 99213 OFFICE O/P EST LOW 20 MIN: CPT | Performed by: FAMILY MEDICINE

## 2020-01-30 NOTE — LETTER
1/30/2020         RE: Kristen Palacios [facility], [place], operated by [facility] [id number]        Dear Colleague,    Thank you for referring your patient, Kristen Bernstein, to the [facility] [place]. Please see a copy of my visit note below.    [facility] - PRIMARY CARE SKIN    CC: skin cancer screening (full-body)  SUBJECTIVE:   Kristen Bernstein is a(n) 54 year old female who presents to clinic today for a full-body skin exam. History of melanoma in situ.    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  Location: left medial distal thigh.  Date of diagnosis: 1/3/2019.     Personal history of other skin cancer: NO.  Personal history of abnormal (dysplastic) moles: YES - mild dysplastic nevus(i) on left leg.     Personal Medical History  Eczema Psoriasis Autoimmune   NO NO NO   Other: sensitive skin.     Family history of melanoma: NO.  Family history of other skin cancer: NO.  Family history of abnormal (dysplastic) moles: NO.     Family Medical History  Eczema Psoriasis Autoimmune   NO NO NO      Sun Exposure History  Previous history of significant sun exposure: YES     Occupation: (indoor).       Refer to electronic medical record (EMR) for past medical history and medications.    INTEGUMENTARY/SKIN: NEGATIVE for worrisome rashes, moles or lesions  ROS: 14 point review of systems was negative except the symptoms listed above in the HPI.    This document serves as a record of the services and decisions personally performed and made by Latoya Hernandes MD and was created by Chaka Acosta, a trained medical scribe, based on personal observations and provider statements to the medical scribe.  January 30, 2020 12:32 PM   Chaka Acosta      OBJECTIVE:   GENERAL: healthy, alert and no distress.  "  LYMPH: Cervical, supraclavicular and axillary nodes are normal.  HEENT: PERRL. Conjunctiva, sclera clear.  SKIN: Waddell Skin Type - II.  This patient was examined from the top of the head to the bottom of the feet  including scalp, face, neck, trunk, buttocks, both arms, both legs, both hands, both feet, and all fingers and toes. The dermatoscope was used to help evaluate pigmented lesions.  Skin Pertinent Findings:  Face: Scattered brown, macule(s) most consistent with benign solar lentigo    Upper extremities: Scattered brown macules of various sizes and shapes most consistent with (benign) melanocytic nevi. Some brown, macule(s) most consistent with benign solar lentigo.     Anterior lower extremities: Multiple scattered brown macules of various sizes and shapes most consistent with (benign) melanocytic nevi.     Dorsum of the right fourth toe: 3 mm benign brown macule.     Back: Scattered multiple brown macules of various sizes and shapes most consistent with (benign) melanocytic nevi.      ASSESSMENT:     Encounter Diagnoses   Name Primary?     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     SUN PROTECTION INSTRUCTIONS  Sun damage can lead to skin cancer and premature aging of the skin.      The best way to protect from sun damage to your skin is to avoid the sun during peak hours (10 am - 2 pm) even on overcast days.    Never use tanning beds. Tanning beds are associated with much higher risks of skin cancer.    All tanning damages the skin. Aim for ivory skin year round and you will have less trouble with your skin in years to come. There is no merit in getting \"a base tan\" before a warm weather vacation, as any tanning " "indicates your body's response to sun damage.    Stop smoking. Smokers have higher rates of skin cancer and also have premature skin wrinkling.    Use UPF sun-protective clothing, which while more expensive initially provides longer lasting coverage without having to worry about remembering to re-apply.  1. Wear a wide-brimmed hat and sunglasses.   2. Wear sun-protective clothing.  [employer] and other [employer] make sun protective clothing that are stylish, comfortable and cool.   [employer] and other [employer] make UV arm sleeves suitable for golfing, gardening and other activities.    Sunscreen instructions:  1. Use sunscreens with Zinc Oxide, Titanium Dioxide or Avobenzone to protect from UVA rays.  2. Use SPF 30-50+ to protect from UVB rays.  3. Re-apply every 2 hours even if water resistant.  4. Apply on your face every day even when cloudy and even in the winter. UVA \"aging rays\" penetrate window glass and are just as strong in the winter as in the summer.    FYI  You should use about 3 tablespoons of sunscreen to protect your whole body. Thus a typical eight ounce bottle of sunscreen should last 4 applications. Remember, that the SPF rating is compromised if you don t apply enough. Most people only apply 1/2 - 1/3 of the amount they need. Also don t forget areas such as your ears, feet, upper back and harder to reach places. Keep in mind that these amounts should be increased for larger body sizes.    Sunscreens with titanium dioxide and/or zinc oxide in the active ingredients are physical blockers as opposed to chemical blockers. Chemical-free sunscreens should not irritate the skin.    Spray-on sunscreens may be used for touch-up application only, not as a base layer. Also, use with caution around small children due to inhalation risk.    SPF means sun protection factor, which is just the degree to which the sunscreen can protect against UVB rays. There is no rating system for UVA rays. SPF is " calculated as the time skin will burn when sunscreen is applied vs. skin without sunscreen.    Water resistant sunscreens should be re-applied every 1-2 hours.    Product Recommendations:    Consider use of sunscreen sticks with Zinc Oxide and Titanium Dioxide active ingredients such as Neutrogena Pure&Free Baby Sunscreen Stick.    Good examples include: Blue Lizard, EltaMD, Solbar    Good daily moisturizers with SPF: Vanicream, CeraVe.    For sensitive skin, consider : SkinMedica Essential Defense Mineral Shield Broad Spectrum SPF 35    Men: consider use of Neutrogena Triple Protect Facial Lotion    Avoid retinyl palmitate products.  Avoid combination products that include both sunscreen and insect repellant, as sunscreen should be applied every 2 hours, but insect repellant should not be applied as frequently.    For more information:  https://www.skincancer.org/prevention/sun-protection/sunscreen/sunscreens-safe-and-effective    SKIN CANCER SELF-EXAM INSTRUCTIONS  Check every month in the mirror or with a household member. Be aware of any changes, especially bleeding or tenderness. Also, make sure to check your nails for color changes after removal of nail polish.    For melanoma, check for:  A - Asymmetry. One half unlike the other half.  B - Border. Irregular, scalloped, ragged, notched, blurred or poorly defined borders.  C - Color. Color variations from one area to another, with shades of tan, brown and/or black present. Sometimes white, red or blue.  D - Diameter. Greater than 6 mm (about the size of a pencil eraser). Any new growth of a mole should be concerning and be evaluated.  E - Evolving. A mole or skin lesion that looks different from the rest or is changing in size, shape or color.    For basal cell carcinoma and squamous cell carcinoma, check for:    Sores, shiny bumps, nodules, scaly lesions, or wart-like growths that are itchy, tender, crusting, scabbing, eroding, oozing or bleeding.    Open  sores/wounds or reddish/irritated areas that do not heal within 2-3 weeks.    Scar-like areas that are white, yellow or waxy in color.    Also, check your lymph nodes for tenderness or swelling every month. Check front of neck, back of neck, over the collarbone, and in the armpits. (Note that if you have a cold or other illness, they may be swollen)      The patient was counseled about sunscreens and sun avoidance. The patient was counseled to check the skin regularly and report any lesion that is new, changing, itching, scabbing, bleeding or otherwise bothersome. The patient was discharged ambulatory and in stable condition.      TT: 25 minutes.  CT: 15 minutes.    The information in this document, created by the medical scribe for me, accurately reflects the services I personally performed and the decisions made by me. I have reviewed and approved this document for accuracy prior to leaving the patient care area.  January 30, 2020 12:32 PM  Latoya Hernandes MD  [facility] [place]      Again, thank you for allowing me to participate in the care of your patient.        Sincerely,        Latoya Hernandes MD     none

## 2020-06-02 ENCOUNTER — ANCILLARY PROCEDURE (OUTPATIENT)
Dept: MAMMOGRAPHY | Facility: CLINIC | Age: 55
End: 2020-06-02
Payer: COMMERCIAL

## 2020-06-02 DIAGNOSIS — Z12.31 ENCOUNTER FOR SCREENING MAMMOGRAM FOR BREAST CANCER: ICD-10-CM

## 2020-06-02 PROCEDURE — 77067 SCR MAMMO BI INCL CAD: CPT | Mod: TC

## 2020-06-02 PROCEDURE — 77063 BREAST TOMOSYNTHESIS BI: CPT | Mod: TC

## 2020-08-21 DIAGNOSIS — L30.9 ECZEMA, UNSPECIFIED TYPE: ICD-10-CM

## 2020-08-25 RX ORDER — BETAMETHASONE DIPROPIONATE 0.5 MG/G
CREAM TOPICAL
Qty: 45 G | Refills: 0 | Status: SHIPPED | OUTPATIENT
Start: 2020-08-25 | End: 2021-01-08

## 2020-08-25 NOTE — TELEPHONE ENCOUNTER
Last Written Prescription Date:  1/3/20  Last Fill Quantity: 45g,  # refills: 1   Last office visit: 1/30/2020 with prescribing provider:     Future Office Visit:          Requested Prescriptions   Pending Prescriptions Disp Refills     betamethasone dipropionate (DIPROSONE) 0.05 % external cream [Pharmacy Med Name: Betamethasone Dipropionate External Cream 0.05 %] 45 g 0     Sig: APPLY TOPICALLY TO THE AFFECTED AREA(S) ON NECK/SHOULDERS TWICE DAILY FOR 10 TO 14 DAYS. NEVER FOR USE ON FACE OR GROIN       There is no refill protocol information for this order

## 2020-12-13 ENCOUNTER — HEALTH MAINTENANCE LETTER (OUTPATIENT)
Age: 55
End: 2020-12-13

## 2021-01-08 ENCOUNTER — OFFICE VISIT (OUTPATIENT)
Dept: FAMILY MEDICINE | Facility: CLINIC | Age: 56
End: 2021-01-08
Payer: COMMERCIAL

## 2021-01-08 VITALS
HEIGHT: 62 IN | OXYGEN SATURATION: 97 % | SYSTOLIC BLOOD PRESSURE: 128 MMHG | HEART RATE: 68 BPM | WEIGHT: 172 LBS | BODY MASS INDEX: 31.65 KG/M2 | DIASTOLIC BLOOD PRESSURE: 80 MMHG | TEMPERATURE: 98.3 F

## 2021-01-08 DIAGNOSIS — I10 HYPERTENSION GOAL BP (BLOOD PRESSURE) < 140/90: ICD-10-CM

## 2021-01-08 DIAGNOSIS — Z12.4 SCREENING FOR MALIGNANT NEOPLASM OF CERVIX: ICD-10-CM

## 2021-01-08 DIAGNOSIS — L30.9 ECZEMA, UNSPECIFIED TYPE: ICD-10-CM

## 2021-01-08 DIAGNOSIS — Z00.00 ANNUAL PHYSICAL EXAM: Primary | ICD-10-CM

## 2021-01-08 PROCEDURE — G0145 SCR C/V CYTO,THINLAYER,RESCR: HCPCS | Performed by: FAMILY MEDICINE

## 2021-01-08 PROCEDURE — 99396 PREV VISIT EST AGE 40-64: CPT | Performed by: FAMILY MEDICINE

## 2021-01-08 PROCEDURE — 87624 HPV HI-RISK TYP POOLED RSLT: CPT | Performed by: FAMILY MEDICINE

## 2021-01-08 RX ORDER — LOSARTAN POTASSIUM 100 MG/1
TABLET ORAL
Qty: 90 TABLET | Refills: 3 | Status: SHIPPED | OUTPATIENT
Start: 2021-01-08 | End: 2021-12-23

## 2021-01-08 RX ORDER — BETAMETHASONE DIPROPIONATE 0.5 MG/G
CREAM TOPICAL
Qty: 45 G | Refills: 0 | Status: SHIPPED | OUTPATIENT
Start: 2021-01-08 | End: 2021-10-12

## 2021-01-08 ASSESSMENT — MIFFLIN-ST. JEOR: SCORE: 1327.31

## 2021-01-08 NOTE — PROGRESS NOTES
SUBJECTIVE:   CC: Kristen Bernstein is an 55 year old woman who presents for preventive health visit.       Patient has been advised of split billing requirements and indicates understanding: Yes  Healthy Habits:     Getting at least 3 servings of Calcium per day:  NO    Bi-annual eye exam:  NO    Dental care twice a year:  Yes    Sleep apnea or symptoms of sleep apnea:  None    Diet:  Regular (no restrictions)    Frequency of exercise:  2-3 days/week    Duration of exercise:  Less than 15 minutes    Taking medications regularly:  Yes    Medication side effects:  None    PHQ-2 Total Score: 0    Additional concerns today:  Yes          Hypertension Follow-up      Do you check your blood pressure regularly outside of the clinic? No     Are you following a low salt diet? No    Are your blood pressures ever more than 140 on the top number (systolic) OR more   than 90 on the bottom number (diastolic), for example 140/90? NA      Uses steroid medication for eczema as needed.  Request a refill  Today's PHQ-2 Score:   PHQ-2 ( 1999 [employer]) 1/6/2021   Q1: Little interest or pleasure in doing things 0   Q2: Feeling down, depressed or hopeless 0   PHQ-2 Score 0   Q1: Little interest or pleasure in doing things Not at all   Q2: Feeling down, depressed or hopeless Not at all   PHQ-2 Score 0       Abuse: Current or Past (Physical, Sexual or Emotional) - No  Do you feel safe in your environment? Yes        Social History     Tobacco Use     Smoking status: Never Smoker     Smokeless tobacco: Never Used   Substance Use Topics     Alcohol use: Yes     Alcohol/week: 0.0 standard drinks     Comment: 1/week         Alcohol Use 1/6/2021   Prescreen: >3 drinks/day or >7 drinks/week? No   Prescreen: >3 drinks/day or >7 drinks/week? -       Reviewed orders with patient.  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      UPPER GI ENDOSCOPY  3/2006    [facility]       Social History     Tobacco Use     Smoking status: Never Smoker     Smokeless tobacco: Never Used   Substance Use Topics     Alcohol use: Yes     Alcohol/week: 0.0 standard drinks     Comment: 1/week     Family History   Problem Relation Age of Onset     Diabetes Father      Hypertension Father      Colon Cancer Father      Hypertension Maternal Grandmother      Heart Failure Maternal Grandmother      Diabetes Maternal Uncle      Lung Cancer Maternal Uncle          Current Outpatient Medications   Medication Sig Dispense Refill     betamethasone dipropionate (DIPROSONE) 0.05 % external cream APPLY TOPICALLY TO THE AFFECTED AREA(S) ON NECK/SHOULDERS TWICE DAILY FOR 10 TO 14 DAYS. NEVER FOR USE ON FACE OR GROIN 45 g 0     losartan (COZAAR) 100 MG tablet Take 1 tablet (100 mg) by mouth daily 90 tablet 3     Pseudoeph-Doxylamine-DM-APAP (NYQUIL PO) Take by mouth nightly as needed       No Known Allergies    Mammogram Screening: Patient over age 50, mutual decision to screen reflected in health maintenance.    Pertinent mammograms are reviewed under the imaging tab.  History of abnormal Pap smear: NO - age 30-65 PAP every 5 years with negative HPV co-testing recommended  PAP / HPV Latest  "Ref Rng & Units 11/12/2015 9/5/2012 9/13/2010   PAP - NIL NIL NIL   HPV 16 DNA NEG Negative - -   HPV 18 DNA NEG Negative - -   OTHER HR HPV NEG Negative - -     Reviewed and updated as needed this visit by clinical staff  Tobacco  Allergies  Meds              Reviewed and updated as needed this visit by Provider                    Review of Systems  CONSTITUTIONAL: NEGATIVE for fever, chills, change in weight  INTEGUMENTARY/SKIN: NEGATIVE for worrisome rashes, moles or lesions  EYES: NEGATIVE for vision changes or irritation  ENT: NEGATIVE for ear, mouth and throat problems  RESP: NEGATIVE for significant cough or SOB  BREAST: NEGATIVE for masses, tenderness or discharge  CV: NEGATIVE for chest pain, palpitations or peripheral edema  GI: NEGATIVE for nausea, abdominal pain, heartburn, or change in bowel habits  : NEGATIVE for unusual urinary or vaginal symptoms. No vaginal bleeding.  MUSCULOSKELETAL: NEGATIVE for significant arthralgias or myalgia  NEURO: NEGATIVE for weakness, dizziness or paresthesias  PSYCHIATRIC: NEGATIVE for changes in mood or affect      OBJECTIVE:   /80   Pulse 68   Temp 98.3  F (36.8  C) (Tympanic)   Ht 1.581 m (5' 2.24\")   Wt 78 kg (172 lb)   LMP 05/15/2015 (Exact Date)   SpO2 97%   BMI 31.21 kg/m    Physical Exam  GENERAL APPEARANCE: healthy, alert and no distress  EYES: Eyes grossly normal to inspection, PERRL and conjunctivae and sclerae normal  HENT: ear canals and TM's normal, nose and mouth without ulcers or lesions, oropharynx clear and oral mucous membranes moist  NECK: no adenopathy, no asymmetry, masses, or scars and thyroid normal to palpation  RESP: lungs clear to auscultation - no rales, rhonchi or wheezes  BREAST: normal without masses, tenderness or nipple discharge and no palpable axillary masses or adenopathy  CV: regular rate and rhythm, normal S1 S2, no S3 or S4, no murmur, click or rub, no peripheral edema and peripheral pulses strong  ABDOMEN: soft, " "nontender, no hepatosplenomegaly, no masses and bowel sounds normal   (female): normal female external genitalia, normal urethral meatus, vaginal mucosal atrophy noted, normal cervix, adnexae, and uterus without masses or abnormal discharge  MS: no musculoskeletal defects are noted and gait is age appropriate without ataxia  SKIN: no suspicious lesions or rashes  NEURO: Normal strength and tone, sensory exam grossly normal, mentation intact and speech normal  PSYCH: mentation appears normal and affect normal/bright        ASSESSMENT/PLAN:   1. Annual physical exam    2. Screening for malignant neoplasm of cervix    - Pap imaged thin layer screen with HPV - recommended age 30 - 65 years (select HPV order below)  - HPV High Risk Types DNA Cervical    3. Eczema, unspecified type  Well managed with the use of steroid medication as needed  - betamethasone dipropionate (DIPROSONE) 0.05 % external cream; APPLY TOPICALLY TO THE AFFECTED AREA(S) ON NECK/SHOULDERS TWICE DAILY FOR 10 TO 14 DAYS. NEVER FOR USE ON FACE OR GROIN  Dispense: 45 g; Refill: 0    4. Hypertension goal BP (blood pressure) < 140/90  Well controlled  - losartan (COZAAR) 100 MG tablet; Take 1 tablet (100 mg) by mouth daily  Dispense: 90 tablet; Refill: 3  - Lipid panel reflex to direct LDL Fasting; Future  - Comprehensive metabolic panel; Future    Patient has been advised of split billing requirements and indicates understanding:   COUNSELING:  Reviewed preventive health counseling, as reflected in patient instructions       Regular exercise       Healthy diet/nutrition    Estimated body mass index is 31.21 kg/m  as calculated from the following:    Height as of this encounter: 1.581 m (5' 2.24\").    Weight as of this encounter: 78 kg (172 lb).    Weight management plan: Discussed healthy diet and exercise guidelines    She reports that she has never smoked. She has never used smokeless tobacco.      Counseling Resources:  ATP IV Guidelines  Pooled " Cohorts Equation Calculator  Breast Cancer Risk Calculator  BRCA-Related Cancer Risk Assessment: FHS-7 Tool  FRAX Risk Assessment  ICSI Preventive Guidelines  Dietary Guidelines for Americans, 2010  USDA's MyPlate  ASA Prophylaxis  Lung CA Screening    Evelin Soto MD  [facility]

## 2021-01-12 LAB
COPATH REPORT: NORMAL
PAP: NORMAL

## 2021-01-14 LAB
FINAL DIAGNOSIS: NORMAL
HPV HR 12 DNA CVX QL NAA+PROBE: NEGATIVE
HPV16 DNA SPEC QL NAA+PROBE: NEGATIVE
HPV18 DNA SPEC QL NAA+PROBE: NEGATIVE
SPECIMEN DESCRIPTION: NORMAL
SPECIMEN SOURCE CVX/VAG CYTO: NORMAL

## 2021-01-18 DIAGNOSIS — I10 HYPERTENSION GOAL BP (BLOOD PRESSURE) < 140/90: ICD-10-CM

## 2021-01-18 PROCEDURE — 80061 LIPID PANEL: CPT | Performed by: FAMILY MEDICINE

## 2021-01-18 PROCEDURE — 80053 COMPREHEN METABOLIC PANEL: CPT | Performed by: FAMILY MEDICINE

## 2021-01-18 PROCEDURE — 36415 COLL VENOUS BLD VENIPUNCTURE: CPT | Performed by: FAMILY MEDICINE

## 2021-01-19 LAB
ALBUMIN SERPL-MCNC: 3.5 G/DL (ref 3.4–5)
ALP SERPL-CCNC: 107 U/L (ref 40–150)
ALT SERPL W P-5'-P-CCNC: 23 U/L (ref 0–50)
ANION GAP SERPL CALCULATED.3IONS-SCNC: 6 MMOL/L (ref 3–14)
AST SERPL W P-5'-P-CCNC: 21 U/L (ref 0–45)
BILIRUB SERPL-MCNC: 0.5 MG/DL (ref 0.2–1.3)
BUN SERPL-MCNC: 18 MG/DL (ref 7–30)
CALCIUM SERPL-MCNC: 8.8 MG/DL (ref 8.5–10.1)
CHLORIDE SERPL-SCNC: 107 MMOL/L (ref 94–109)
CHOLEST SERPL-MCNC: 178 MG/DL
CO2 SERPL-SCNC: 26 MMOL/L (ref 20–32)
CREAT SERPL-MCNC: 1.05 MG/DL (ref 0.52–1.04)
GFR SERPL CREATININE-BSD FRML MDRD: 59 ML/MIN/{1.73_M2}
GLUCOSE SERPL-MCNC: 104 MG/DL (ref 70–99)
HDLC SERPL-MCNC: 45 MG/DL
LDLC SERPL CALC-MCNC: 105 MG/DL
NONHDLC SERPL-MCNC: 133 MG/DL
POTASSIUM SERPL-SCNC: 4.5 MMOL/L (ref 3.4–5.3)
PROT SERPL-MCNC: 7.1 G/DL (ref 6.8–8.8)
SODIUM SERPL-SCNC: 139 MMOL/L (ref 133–144)
TRIGL SERPL-MCNC: 138 MG/DL

## 2021-04-05 ENCOUNTER — IMMUNIZATION (OUTPATIENT)
Dept: NURSING | Facility: CLINIC | Age: 56
End: 2021-04-05
Payer: COMMERCIAL

## 2021-04-05 PROCEDURE — 0001A PR COVID VAC PFIZER DIL RECON 30 MCG/0.3 ML IM: CPT

## 2021-04-05 PROCEDURE — 91300 PR COVID VAC PFIZER DIL RECON 30 MCG/0.3 ML IM: CPT

## 2021-04-26 ENCOUNTER — IMMUNIZATION (OUTPATIENT)
Dept: NURSING | Facility: CLINIC | Age: 56
End: 2021-04-26
Attending: PHARMACIST
Payer: COMMERCIAL

## 2021-04-26 PROCEDURE — 91300 PR COVID VAC PFIZER DIL RECON 30 MCG/0.3 ML IM: CPT

## 2021-04-26 PROCEDURE — 0002A PR COVID VAC PFIZER DIL RECON 30 MCG/0.3 ML IM: CPT

## 2021-07-07 DIAGNOSIS — Z12.31 VISIT FOR SCREENING MAMMOGRAM: ICD-10-CM

## 2021-07-07 PROCEDURE — 77063 BREAST TOMOSYNTHESIS BI: CPT | Mod: TC | Performed by: RADIOLOGY

## 2021-07-07 PROCEDURE — 77067 SCR MAMMO BI INCL CAD: CPT | Mod: TC | Performed by: RADIOLOGY

## 2021-09-26 ENCOUNTER — HEALTH MAINTENANCE LETTER (OUTPATIENT)
Age: 56
End: 2021-09-26

## 2021-10-12 ENCOUNTER — OFFICE VISIT (OUTPATIENT)
Dept: FAMILY MEDICINE | Facility: CLINIC | Age: 56
End: 2021-10-12
Payer: COMMERCIAL

## 2021-10-12 VITALS — SYSTOLIC BLOOD PRESSURE: 122 MMHG | DIASTOLIC BLOOD PRESSURE: 70 MMHG

## 2021-10-12 DIAGNOSIS — D22.9 MULTIPLE BENIGN NEVI: ICD-10-CM

## 2021-10-12 DIAGNOSIS — Z87.898 HISTORY OF ATYPICAL NEVUS: ICD-10-CM

## 2021-10-12 DIAGNOSIS — Z12.83 SKIN CANCER SCREENING: Primary | ICD-10-CM

## 2021-10-12 DIAGNOSIS — L24.9 IRRITANT DERMATITIS: ICD-10-CM

## 2021-10-12 PROCEDURE — 99203 OFFICE O/P NEW LOW 30 MIN: CPT | Performed by: PHYSICIAN ASSISTANT

## 2021-10-12 RX ORDER — BETAMETHASONE DIPROPIONATE 0.5 MG/G
CREAM TOPICAL
Qty: 45 G | Refills: 3 | Status: SHIPPED | OUTPATIENT
Start: 2021-10-12 | End: 2022-10-11

## 2021-10-12 NOTE — LETTER
10/12/2021         RE: Krist[place] Bernstein  [address] [place] [address]        Dear Colleague,    Thank you for referring your patient, Kristen Bernstein, to the [facility]. Please see a copy of my visit note below.    [facility] Note  Encounter Date: Oct 12, 2021  Office Visit     Dermatology Problem List:  #. History dysplastic and atypical nevus  Skin cancer screening 10/12/21  ____________________________________________    Assessment & Plan:    #. History of dysplastic nevi.   - Continue photoprotection  - Continue yearly skin exams  - Advised to monitor for changing, non-healing, bleeding, painful, changing, or otherwise symptomatic lesions    #. Atypical melanocytic proliferation, left lower anterior leg. Photodocumentation obtained today.    #. Benign lesions: Multiple benign nevi, solar lentigos, seborrheic keratoses, cherry angiomas. Explained to patient benign nature of lesion. No treatment is necessary at this time unless the lesion changes or becomes symptomatic.   - ABCDs of melanoma were discussed and self skin checks were advised.  - Sun precaution was advised including the use of sun screens of SPF 30 or higher, sun protective clothing, and avoidance of tanning beds.     Procedures Performed:   None    Follow-up: 6 months, or earlier for new or changing lesions    Staff and Scribe:     Provider Disclosure:   The documentation recorded by the scribe accurately reflects the services I personally performed and the decisions made by me.    All risks, benefits and alternatives were discussed with patient.  Patient is in agreement and understands the assessment and plan.  All questions were answered.  Sun Screen Education was given.   Return to Clinic 6 months or sooner as needed.   Saskia Oliveros PA-C   [facility]     Scribe Disclosure:  I, Yanni Black, am serving as a scribe to document services  personally performed by Saskia Oliveros PA-C based on data collection and the provider's statements to me.   ____________________________________________    CC: Skin Check (Request a refill of betamethasone.)      HPI:  Ms. Kristen Bernstein is a(n) 56 year old female who presents today as a new patient for FBSE.    The patient was last seen on 1/30/2020 by Dr Hernandes, at which time she had a benign FBSE.      Today, she reports some spots on her scalp and upper extremities that are new and some are itchy. She has been prescribed betamethasone which has cleared these areas. She would like a refill.      She denies painful, bleeding, or nonhealing spots. Denies family history of skin cancer or other skin diseases.    Patient is otherwise feeling well, without additional skin concerns. She denies significant sun burns when she was younger, though states that she did have a lot of sun     Labs Reviewed:  N/A    Physical Exam:  Vitals: /70   LMP 05/15/2015 (Exact Date)   SKIN: Full skin, which includes the head/face, both arms, chest, back, abdomen,both legs, genitalia and/or groin buttocks, digits and/or nails, was examined.  - Slightly hyperpigmented symmetric nevus on the left lower anterior leg. No concerning features on dermoscopy.  - Linear scar on the left medial thigh, no pigmentation.  - Atrophic macule on the medial lower leg, no pigmentation.  - Multiple regular brown pigmented macules and papules are identified on the trunk and extremities.   - Scattered brown macules on sun exposed areas.  - There are waxy stuck on tan to brown papules on the trunk and extremities  - No other lesions of concern on areas examined.     Medications:  Current Outpatient Medications   Medication     betamethasone dipropionate (DIPROSONE) 0.05 % external cream     losartan (COZAAR) 100 MG tablet     Pseudoeph-Doxylamine-DM-APAP (NYQUIL PO)     No current facility-administered medications for this visit.        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      Again, thank you for allowing me to participate in the care of your patient.        Sincerely,        Saskia Oliveros PA-C

## 2021-10-12 NOTE — PATIENT INSTRUCTIONS
Proper skin care from [facility]:    -Eliminate harsh soaps as they strip the natural oils from the skin, often resulting in dry itchy skin ( i.e. Dial, Zest, Babs Spring)  -Use mild soaps such as Cetaphil or Dove Sensitive Skin in the shower. You do not need to use soap on arms, legs, and trunk every time you shower unless visibly soiled.   -Avoid hot or cold showers.  -After showering, lightly dry off and apply moisturizing within 2-3 minutes. This will help trap moisture in the skin.   -Aggressive use of a moisturizer at least 1-2 times a day to the entire body (including -Vanicream, Cetaphil, Aquaphor or Cerave) and moisturize hands after every washing.  -We recommend using moisturizers that come in a tub that needs to be scooped out, not a pump. This has more of an oil base. It will hold moisture in your skin much better than a water base moisturizer. The above recommended are non-pore clogging.      Wear a sunscreen with at least SPF 30 on your face, ears, neck and V of the chest daily. Wear sunscreen on other areas of the body if those areas are exposed to the sun throughout the day. Sunscreens can contain physical and/or chemical blockers. Physical blockers are less likely to clog pores, these include zinc oxide and titanium dioxide. Reapply every two hour and after swimming.     Sunscreen examples: https://www.ewg.org/sunscreen/    UV radiation  UVA radiation remains constant throughout the day and throughout the year. It is a longer wavelength than UVB and therefore penetrates deeper into the skin leading to immediate and delayed tanning, photoaging, and skin cancer. 70-80% of UVA and UVB radiation occurs between the hours of 10am-2pm.  UVB radiation  UVB radiation causes the most harmful effects and is more significant during the summer months. However, snow and ice can reflect UVB radiation leading to skin damage during the winter months as well. UVB radiation is responsible for tanning,  burning, inflammation, delayed erythema (pinkness), pigmentation (brown spots), and skin cancer.     I recommend self monthly full body exams and yearly full body exams with a dermatology provider. If you develop a new or changing lesion please follow up for examination. Most skin cancers are pink and scaly or pink and pearly. However, we do see blue/brown/black skin cancers.  Consider the ABCDEs of melanoma when giving yourself your monthly full body exam ( don't forget the groin, buttocks, feet, toes, etc). A-asymmetry, B-borders, C-color, D-diameter, E-elevation or evolving. If you see any of these changes please follow up in clinic. If you cannot see your back I recommend purchasing a hand held mirror to use with a larger wall mirror.

## 2021-10-12 NOTE — PROGRESS NOTES
[facility] Dermatology Note  Encounter Date: Oct 12, 2021  Office Visit     Dermatology Problem List:  #. History dysplastic and atypical nevus  Skin cancer screening 10/12/21    # Irritant dermatitis, betamethasone 0.05% cream   ____________________________________________    Assessment & Plan:    #. History of dysplastic nevi.   - Continue photoprotection  - Continue yearly skin exams  - Advised to monitor for changing, non-healing, bleeding, painful, changing, or otherwise symptomatic lesions    #. Atypical melanocytic proliferation, left lower anterior leg. Photodocumentation obtained today.    #. Benign lesions: Multiple benign nevi, solar lentigos, seborrheic keratoses, cherry angiomas. Explained to patient benign nature of lesion. No treatment is necessary at this time unless the lesion changes or becomes symptomatic.   - ABCDs of melanoma were discussed and self skin checks were advised.  - Sun precaution was advised including the use of sun screens of SPF 30 or higher, sun protective clothing, and avoidance of tanning beds.   #  Irritant dermatitis, scalp, flanks intermittently. responsive to topical steroids.  Restart betamethsone  0.05% cream bid prn  Procedures Performed:   None    Follow-up: 6 months, or earlier for new or changing lesions    Staff and Scribe:     Provider Disclosure:   The documentation recorded by the scribe accurately reflects the services I personally performed and the decisions made by me.    All risks, benefits and alternatives were discussed with patient.  Patient is in agreement and understands the assessment and plan.  All questions were answered.  Sun Screen Education was given.   Return to Clinic 6 months or sooner as needed.   Saskia Oliveros PA-C   [facility]     Scribe Disclosure:  I, Yanni Black, am serving as a scribe to document services personally performed by Saskia Oliveros PA-C based on data collection  and the provider's statements to me.   ____________________________________________    CC: Skin Check (Request a refill of betamethasone.)      HPI:  Ms. Kristen Bernstein is a(n) 56 year old female who presents today as a new patient for FBSE.    The patient was last seen on 1/30/2020 by Dr Hernandes, at which time she had a benign FBSE.      Today, she reports some spots on her scalp and upper extremities that are new and some are itchy. She has been prescribed betamethasone which has cleared these areas. She would like a refill.      She denies painful, bleeding, or nonhealing spots. Denies family history of skin cancer or other skin diseases.    Patient is otherwise feeling well, without additional skin concerns. She denies significant sun burns when she was younger, though states that she did have a lot of sun     Labs Reviewed:  N/A    Physical Exam:  Vitals: /70   LMP 05/15/2015 (Exact Date)   SKIN: Full skin, which includes the head/face, both arms, chest, back, abdomen,both legs, genitalia and/or groin buttocks, digits and/or nails, was examined.  - Slightly hyperpigmented symmetric nevus on the left lower anterior leg. No concerning features on dermoscopy.  - Linear scar on the left medial thigh, no pigmentation.  - Atrophic macule on the medial lower leg, no pigmentation.  - Multiple regular brown pigmented macules and papules are identified on the trunk and extremities.   - Scattered brown macules on sun exposed areas.  - There are waxy stuck on tan to brown papules on the trunk and extremities  -No pink scaly plaques on the flanks or scalp   - No other lesions of concern on areas examined.     Medications:  Current Outpatient Medications   Medication     betamethasone dipropionate (DIPROSONE) 0.05 % external cream     losartan (COZAAR) 100 MG tablet     Pseudoeph-Doxylamine-DM-APAP (NYQUIL PO)     No current facility-administered medications for this visit.        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

## 2021-12-22 DIAGNOSIS — I10 HYPERTENSION GOAL BP (BLOOD PRESSURE) < 140/90: ICD-10-CM

## 2021-12-22 NOTE — TELEPHONE ENCOUNTER
Routing refill request to provider for review/approval because:  Labs out of range:  Gael JEWELL RN  EP Triage

## 2021-12-23 RX ORDER — LOSARTAN POTASSIUM 100 MG/1
TABLET ORAL
Qty: 90 TABLET | Refills: 0 | Status: SHIPPED | OUTPATIENT
Start: 2021-12-23 | End: 2022-02-07

## 2022-01-06 ENCOUNTER — VIRTUAL VISIT (OUTPATIENT)
Dept: FAMILY MEDICINE | Facility: CLINIC | Age: 57
End: 2022-01-06
Payer: COMMERCIAL

## 2022-01-06 DIAGNOSIS — N30.00 ACUTE CYSTITIS WITHOUT HEMATURIA: Primary | ICD-10-CM

## 2022-01-06 PROCEDURE — 99213 OFFICE O/P EST LOW 20 MIN: CPT | Mod: TEL | Performed by: FAMILY MEDICINE

## 2022-01-06 RX ORDER — NITROFURANTOIN 25; 75 MG/1; MG/1
100 CAPSULE ORAL 2 TIMES DAILY
Qty: 10 CAPSULE | Refills: 0 | Status: SHIPPED | OUTPATIENT
Start: 2022-01-06 | End: 2022-01-11

## 2022-01-06 NOTE — PROGRESS NOTES
ASSESMENT AND PLAN:  Diagnoses and all orders for this visit:  Acute cystitis without hematuria  Discussed 3 potential options with the patient.  Observation versus ordering UA UC and awaiting those results versus empiric treatment based on her symptoms.  We reviewed the risks and benefits of treatment and she would like to go ahead and start treatment as she is pretty convinced based on her previous presentation that she does have a urinary tract infection.  Counseled the patient on the importance of follow-up for an exam and full evaluation and labs if she does not have complete symptom resolution with this treatment.  -     nitroFURantoin macrocrystal-monohydrate (MACROBID) 100 MG capsule; Take 1 capsule (100 mg) by mouth 2 times daily for 5 days      Reviewed the risks and benefits of the treatment plan with the patient and/or caregiver and we discussed indications for routine and emergent follow-up.    Duration of telephone call: 11 minutes.    SUBJECTIVE: 56-year-old female has a 2-day history of urinary frequency, which she describes as a pressure rather than a pain in the pelvis, and frequent feeling like she has to urinate without much urine coming out.  This is consistent with what she has felt with previous urinary tract infections, she has not had any in the last couple of years.  No recent antibiotics, no history of allergies.  She has not been having any fever or chills or vomiting.  No burning with urination or visible blood in the urine or pink color in the urine.  No abdominal pain or flank pain.    Past Medical History:   Diagnosis Date     GERD      Rash and other nonspecific skin eruption (aka PRURITIS)      Unspecified essential hypertension      Patient Active Problem List   Diagnosis     Hypertension goal BP (blood pressure) < 140/90     Family history of colon cancer     GERD (gastroesophageal reflux disease)     CARDIOVASCULAR SCREENING; LDL GOAL LESS THAN 130     History of melanoma in situ  - atypical melanocytic proliferation best treated as an early evolving melanoma in situ     Current Outpatient Medications   Medication Sig Dispense Refill     nitroFURantoin macrocrystal-monohydrate (MACROBID) 100 MG capsule Take 1 capsule (100 mg) by mouth 2 times daily for 5 days 10 capsule 0     betamethasone dipropionate (DIPROSONE) 0.05 % external cream APPLY TOPICALLY TO THE AFFECTED AREA(S) ON NECK/SHOULDERS TWICE DAILY FOR 10 TO 14 DAYS. NEVER FOR USE ON FACE OR GROIN 45 g 3     losartan (COZAAR) 100 MG tablet TAKE ONE TABLET BY MOUTH ONCE DAILY 90 tablet 0     Pseudoeph-Doxylamine-DM-APAP (NYQUIL PO) Take by mouth nightly as needed       History   Smoking Status     Never Smoker   Smokeless Tobacco     Never Used       OBJECTICE: LMP 05/15/2015 (Exact Date)      No results found for this or any previous visit (from the past 24 hour(s)).       Rafael Montgomery MD

## 2022-02-07 RX ORDER — LOSARTAN POTASSIUM 100 MG/1
TABLET ORAL
Qty: 90 TABLET | Refills: 0 | Status: SHIPPED | OUTPATIENT
Start: 2022-02-07 | End: 2022-04-06

## 2022-02-07 NOTE — TELEPHONE ENCOUNTER
Reason for Call:  Medication or medication refill:  Refill of Losartan 100mg  Do you use a [facility]?  Name [facility] and [facility] the [phone]    Name of the medication requested:Losartan (COZAAR) 100mg    Other request: to be refilled    Can we leave a detailed message on this number? YES    Phone number patient can be reached at: Home number on file [phone] (home)    Best Time: anytime    Call taken on 2/7/2022 at 12:56 PM by Cecile Sheikh

## 2022-03-13 ENCOUNTER — HEALTH MAINTENANCE LETTER (OUTPATIENT)
Age: 57
End: 2022-03-13

## 2022-04-06 ENCOUNTER — OFFICE VISIT (OUTPATIENT)
Dept: FAMILY MEDICINE | Facility: CLINIC | Age: 57
End: 2022-04-06
Payer: COMMERCIAL

## 2022-04-06 VITALS
DIASTOLIC BLOOD PRESSURE: 84 MMHG | SYSTOLIC BLOOD PRESSURE: 126 MMHG | WEIGHT: 173.6 LBS | TEMPERATURE: 98.1 F | OXYGEN SATURATION: 93 % | RESPIRATION RATE: 16 BRPM | HEART RATE: 80 BPM | BODY MASS INDEX: 31.5 KG/M2

## 2022-04-06 DIAGNOSIS — I10 HYPERTENSION GOAL BP (BLOOD PRESSURE) < 140/90: ICD-10-CM

## 2022-04-06 DIAGNOSIS — Z23 HIGH PRIORITY FOR 2019-NCOV VACCINE: ICD-10-CM

## 2022-04-06 DIAGNOSIS — Z00.00 ANNUAL PHYSICAL EXAM: Primary | ICD-10-CM

## 2022-04-06 LAB
ALBUMIN SERPL-MCNC: 3.7 G/DL (ref 3.4–5)
ALP SERPL-CCNC: 111 U/L (ref 40–150)
ALT SERPL W P-5'-P-CCNC: 26 U/L (ref 0–50)
ANION GAP SERPL CALCULATED.3IONS-SCNC: 7 MMOL/L (ref 3–14)
AST SERPL W P-5'-P-CCNC: 20 U/L (ref 0–45)
BILIRUB SERPL-MCNC: 0.5 MG/DL (ref 0.2–1.3)
BUN SERPL-MCNC: 18 MG/DL (ref 7–30)
CALCIUM SERPL-MCNC: 9 MG/DL (ref 8.5–10.1)
CHLORIDE BLD-SCNC: 105 MMOL/L (ref 94–109)
CHOLEST SERPL-MCNC: 152 MG/DL
CO2 SERPL-SCNC: 25 MMOL/L (ref 20–32)
CREAT SERPL-MCNC: 1 MG/DL (ref 0.52–1.04)
FASTING STATUS PATIENT QL REPORTED: YES
GFR SERPL CREATININE-BSD FRML MDRD: 65 ML/MIN/1.73M2
GLUCOSE BLD-MCNC: 97 MG/DL (ref 70–99)
HDLC SERPL-MCNC: 42 MG/DL
LDLC SERPL CALC-MCNC: 90 MG/DL
NONHDLC SERPL-MCNC: 110 MG/DL
POTASSIUM BLD-SCNC: 4.2 MMOL/L (ref 3.4–5.3)
PROT SERPL-MCNC: 7 G/DL (ref 6.8–8.8)
SODIUM SERPL-SCNC: 137 MMOL/L (ref 133–144)
TRIGL SERPL-MCNC: 102 MG/DL

## 2022-04-06 PROCEDURE — 0054A COVID-19,PF,PFIZER (12+ YRS): CPT | Performed by: FAMILY MEDICINE

## 2022-04-06 PROCEDURE — 80061 LIPID PANEL: CPT | Performed by: FAMILY MEDICINE

## 2022-04-06 PROCEDURE — 80053 COMPREHEN METABOLIC PANEL: CPT | Performed by: FAMILY MEDICINE

## 2022-04-06 PROCEDURE — 91305 COVID-19,PF,PFIZER (12+ YRS): CPT | Performed by: FAMILY MEDICINE

## 2022-04-06 PROCEDURE — 99396 PREV VISIT EST AGE 40-64: CPT | Performed by: FAMILY MEDICINE

## 2022-04-06 PROCEDURE — 99213 OFFICE O/P EST LOW 20 MIN: CPT | Mod: 25 | Performed by: FAMILY MEDICINE

## 2022-04-06 PROCEDURE — 36415 COLL VENOUS BLD VENIPUNCTURE: CPT | Performed by: FAMILY MEDICINE

## 2022-04-06 RX ORDER — LOSARTAN POTASSIUM 100 MG/1
TABLET ORAL
Qty: 90 TABLET | Refills: 3 | Status: SHIPPED | OUTPATIENT
Start: 2022-04-06 | End: 2023-04-25

## 2022-04-06 ASSESSMENT — ENCOUNTER SYMPTOMS
NAUSEA: 0
DYSURIA: 0
HEMATOCHEZIA: 0
BREAST MASS: 0
ARTHRALGIAS: 0
MYALGIAS: 0
JOINT SWELLING: 0
SHORTNESS OF BREATH: 0
WEAKNESS: 0
FEVER: 0
DIZZINESS: 0
SORE THROAT: 0
PARESTHESIAS: 0
HEMATURIA: 0
NERVOUS/ANXIOUS: 0
CHILLS: 0
COUGH: 0
HEADACHES: 0
ABDOMINAL PAIN: 0
HEARTBURN: 0
DIARRHEA: 0
CONSTIPATION: 0
PALPITATIONS: 0
FREQUENCY: 0
EYE PAIN: 0

## 2022-04-06 ASSESSMENT — PAIN SCALES - GENERAL: PAINLEVEL: NO PAIN (0)

## 2022-04-06 NOTE — PROGRESS NOTES
SUBJECTIVE:   CC: Kristen Bernstein is an 56 year old woman who presents for preventive health visit.       Patient has been advised of split billing requirements and indicates understanding: Yes  Healthy Habits:     Getting at least 3 servings of Calcium per day:  NO    Bi-annual eye exam:  Yes    Dental care twice a year:  Yes    Sleep apnea or symptoms of sleep apnea:  None    Diet:  Regular (no restrictions)    Frequency of exercise:  4-5 days/week    Duration of exercise:  15-30 minutes    Taking medications regularly:  Yes    Medication side effects:  None    PHQ-2 Total Score: 0    Additional concerns today:  No      Hypertension Follow-up      Do you check your blood pressure regularly outside of the clinic? No     Are you following a low salt diet? Yes    Are your blood pressures ever more than 140 on the top number (systolic) OR more   than 90 on the bottom number (diastolic), for example 140/90? No      Today's PHQ-2 Score:   PHQ-2 ( 1999 [employer]) 4/6/2022   Q1: Little interest or pleasure in doing things 0   Q2: Feeling down, depressed or hopeless 0   PHQ-2 Score 0   PHQ-2 Total Score (12-17 Years)- Positive if 3 or more points; Administer PHQ-A if positive -   Q1: Little interest or pleasure in doing things Not at all   Q2: Feeling down, depressed or hopeless Not at all   PHQ-2 Score 0       Abuse: Current or Past (Physical, Sexual or Emotional) - No  Do you feel safe in your environment? Yes        Social History     Tobacco Use     Smoking status: Never Smoker     Smokeless tobacco: Never Used   Substance Use Topics     Alcohol use: Yes     Alcohol/week: 0.0 standard drinks     Comment: 1/week         Alcohol Use 4/6/2022   Prescreen: >3 drinks/day or >7 drinks/week? No   Prescreen: >3 drinks/day or >7 drinks/week? -       Reviewed orders with patient.  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       [facility] COLONOSCOPY THRU STOMA, DIAGNOSTIC  2007    polyps removed.        Social History     Tobacco Use     Smoking status: Never Smoker     Smokeless tobacco: Never Used   Substance Use Topics     Alcohol use: Yes     Alcohol/week: 0.0 standard drinks     Comment: 1/week     Family History   Problem Relation Age of Onset     Diabetes Father      Hypertension Father      Colon Cancer Father      Hypertension Maternal Grandmother      Heart Failure Maternal Grandmother      Diabetes Maternal Uncle      Lung Cancer Maternal Uncle          Current Outpatient Medications   Medication Sig Dispense Refill     betamethasone dipropionate (DIPROSONE) 0.05 % external cream APPLY TOPICALLY TO THE AFFECTED AREA(S) ON NECK/SHOULDERS TWICE DAILY FOR 10 TO 14 DAYS. NEVER FOR USE ON FACE OR GROIN 45 g 3     losartan (COZAAR) 100 MG tablet TAKE ONE TABLET BY MOUTH ONCE DAILY 90 tablet 3     No Known Allergies    Breast Cancer Screening:    FHS-7:   Breast CA Risk Assessment (FHS-7) 4/6/2022   Did any of your first-degree relatives have breast or ovarian cancer? No   Did any of your relatives have bilateral breast cancer? No       Mammogram Screening: Recommended mammography every 1-2 years with patient discussion and risk factor  consideration  Pertinent mammograms are reviewed under the imaging tab.    History of abnormal Pap smear: NO - age 30-65 PAP every 5 years with negative HPV co-testing recommended  PAP / HPV Latest Ref Rng & Units 1/8/2021 11/12/2015 9/5/2012   PAP (Historical) - NIL NIL NIL   HPV16 NEG:Negative Negative Negative -   HPV18 NEG:Negative Negative Negative -   HRHPV NEG:Negative Negative Negative -     Reviewed and updated as needed this visit by clinical staff   Tobacco  Allergies  Meds   Med Hx            Reviewed and updated as needed this visit by Provider    Allergies    Med Hx               Review of Systems   Constitutional: Negative for chills and fever.   HENT: Negative for congestion, ear pain, hearing loss and sore throat.    Eyes: Negative for pain and visual disturbance.   Respiratory: Negative for cough and shortness of breath.    Cardiovascular: Negative for chest pain, palpitations and peripheral edema.   Gastrointestinal: Negative for abdominal pain, constipation, diarrhea, heartburn, hematochezia and nausea.   Breasts:  Negative for tenderness, breast mass and discharge.   Genitourinary: Negative for dysuria, frequency, genital sores, hematuria, pelvic pain, urgency, vaginal bleeding and vaginal discharge.   Musculoskeletal: Negative for arthralgias, joint swelling and myalgias.   Skin: Negative for rash.   Neurological: Negative for dizziness, weakness, headaches and paresthesias.   Psychiatric/Behavioral: Negative for mood changes. The patient is not nervous/anxious.           OBJECTIVE:   /84 (BP Location: Left arm, Patient Position: Sitting, Cuff Size: Adult Regular)   Pulse 80   Temp 98.1  F (36.7  C) (Tympanic)   Resp 16   Wt 78.7 kg (173 lb 9.6 oz)   LMP 05/15/2015 (Exact Date)   SpO2 93%   BMI 31.50 kg/m    Physical Exam  GENERAL APPEARANCE: healthy, alert and no distress  EYES: Eyes grossly normal to inspection, PERRL and conjunctivae and sclerae normal  HENT: ear canals  "and TM's normal, nose and mouth without ulcers or lesions, oropharynx clear and oral mucous membranes moist  NECK: no adenopathy, no asymmetry, masses, or scars and thyroid normal to palpation  RESP: lungs clear to auscultation - no rales, rhonchi or wheezes  BREAST: normal without masses, tenderness or nipple discharge and no palpable axillary masses or adenopathy  CV: regular rate and rhythm, normal S1 S2, no S3 or S4, no murmur, click or rub, no peripheral edema and peripheral pulses strong  ABDOMEN: soft, nontender, no hepatosplenomegaly, no masses and bowel sounds normal  MS: no musculoskeletal defects are noted and gait is age appropriate without ataxia  SKIN: no suspicious lesions or rashes  NEURO: Normal strength and tone, sensory exam grossly normal, mentation intact and speech normal  PSYCH: mentation appears normal and affect normal/bright        ASSESSMENT/PLAN:     1. Annual physical exam      2. Hypertension goal BP (blood pressure) < 140/90  Well-controlled.  Resume current medication.  Refill on medication ordered.  Labs ordered.  Patient is fasting today  - losartan (COZAAR) 100 MG tablet; TAKE ONE TABLET BY MOUTH ONCE DAILY  Dispense: 90 tablet; Refill: 3    - Lipid panel reflex to direct LDL Fasting  - Comprehensive metabolic panel    3. High priority for 2019-nCoV vaccine    - COVID-19,PF,PFIZER (12+ Yrs GRAY LABEL)        COUNSELING:  Reviewed preventive health counseling, as reflected in patient instructions       Regular exercise       Healthy diet/nutrition    Estimated body mass index is 31.5 kg/m  as calculated from the following:    Height as of 1/8/21: 1.581 m (5' 2.24\").    Weight as of this encounter: 78.7 kg (173 lb 9.6 oz).    Weight management plan: Discussed healthy diet and exercise guidelines    She reports that she has never smoked. She has never used smokeless tobacco.      Counseling Resources:  ATP IV Guidelines  Pooled Cohorts Equation Calculator  Breast Cancer Risk " Calculator  BRCA-Related Cancer Risk Assessment: FHS-7 Tool  FRAX Risk Assessment  ICSI Preventive Guidelines  Dietary Guidelines for Americans, 2010  USDA's MyPlate  ASA Prophylaxis  Lung CA Screening    Evelin Soto MD  [facility]

## 2022-04-12 ENCOUNTER — OFFICE VISIT (OUTPATIENT)
Dept: FAMILY MEDICINE | Facility: CLINIC | Age: 57
End: 2022-04-12
Payer: COMMERCIAL

## 2022-04-12 VITALS — SYSTOLIC BLOOD PRESSURE: 122 MMHG | DIASTOLIC BLOOD PRESSURE: 60 MMHG

## 2022-04-12 DIAGNOSIS — D22.9 MULTIPLE BENIGN NEVI: ICD-10-CM

## 2022-04-12 DIAGNOSIS — Z87.898 HISTORY OF ATYPICAL NEVUS: ICD-10-CM

## 2022-04-12 DIAGNOSIS — D48.9 NEOPLASM OF UNCERTAIN BEHAVIOR: ICD-10-CM

## 2022-04-12 DIAGNOSIS — Z12.83 SKIN CANCER SCREENING: Primary | ICD-10-CM

## 2022-04-12 DIAGNOSIS — D18.01 CHERRY ANGIOMA: ICD-10-CM

## 2022-04-12 DIAGNOSIS — L81.4 SOLAR LENTIGO: ICD-10-CM

## 2022-04-12 DIAGNOSIS — L82.1 SEBORRHEIC KERATOSIS: ICD-10-CM

## 2022-04-12 PROCEDURE — 88305 TISSUE EXAM BY PATHOLOGIST: CPT | Performed by: DERMATOLOGY

## 2022-04-12 PROCEDURE — 99213 OFFICE O/P EST LOW 20 MIN: CPT | Mod: 25 | Performed by: PHYSICIAN ASSISTANT

## 2022-04-12 PROCEDURE — 11102 TANGNTL BX SKIN SINGLE LES: CPT | Performed by: PHYSICIAN ASSISTANT

## 2022-04-12 NOTE — PROGRESS NOTES
[facility] Dermatology Note  Encounter Date: Apr 12, 2022  Office Visit     Dermatology Problem List:  0. Skin cancer screening 4/12/22  1. History of dysplastic and atypical nevi  2. Irritant dermatitis  - Betamethasone 0.05% cream  3. NUB, left lower leg s/p shave 4/12/2022   ____________________________________________    Assessment & Plan:    # Neoplasm of uncertain behavior, left lower leg. R/o atypia  - Shave biopsy performed today, see note below.    # History of dysplastic nevi  - Continue photoprotection  - Continue yearly skin exams  - Advised to monitor for changing, non-healing, bleeding, painful, changing, or otherwise symptomatic lesions    # Benign lesions: Multiple benign nevi, solar lentigines, seborrheic keratoses, cherry angiomas.   Explained to patient benign nature of lesion. No treatment is necessary at this time unless the lesion changes or becomes symptomatic.   - ABCDs of melanoma were discussed and self skin checks were advised.  - Sun precaution was advised including the use of sun screens of SPF 30 or higher, sun protective clothing, and avoidance of tanning beds.      Procedures Performed:   - Shave biopsy procedure note, location(s): see above. After discussion of benefits and risks including but not limited to bleeding, infection, scar, incomplete removal, recurrence, and non-diagnostic biopsy, written consent and photographs were obtained. The area was cleaned with isopropyl alcohol. 0.5mL of 1% lidocaine with epinephrine was injected to obtain adequate anesthesia of lesion(s). Shave biopsy at site(s) performed. Hemostasis was achieved with aluminium chloride. Petrolatum ointment and a sterile dressing were applied. The patient tolerated the procedure and no complications were noted. The patient was provided with verbal and written post care instructions.     Follow-up: pending path results    Staff and Scribe:     Scribe Disclosure:  Yanni ASCENCIO am  serving as a scribe to document services personally performed by Saskia Oliveros PA-C based on data collection and the provider's statements to me.     Provider Disclosure:   The documentation recorded by the scribe accurately reflects the services I personally performed and the decisions made by me.    All risks, benefits and alternatives were discussed with patient.  Patient is in agreement and understands the assessment and plan.  All questions were answered.  Sun Screen Education was given.   Return to Clinic in 6 months or sooner as needed.   Saskia Oliveros PA-C   [facility]   ____________________________________________    CC: Derm Problem (6 month FBS)    HPI:  Ms. Kristen Bernstein is a(n) 56 year old female who presents today as a return patient for FBSE. The patient was last seen in dermatology on 10/21/21 by myself, at which time she had a benign skin exam.    Today, the patient denies any new or particularly concerning spots that she has noticed. She states that she has a lot of spots, however, and is not completely sure if any of them have changed.    While here, the patient states that her scalp has been itchy lately. She has started using Free & Clear shampoo because of this. Patient denies noticeable flakiness or other symptoms, just itching.     Patient is otherwise feeling well, without additional skin concerns.    Labs Reviewed:  N/A    Physical Exam:  Vitals: /60   LMP 05/15/2015 (Exact Date)   SKIN: Total skin excluding the undergarment areas was performed. The exam included the head/face, neck, both arms, chest, back, abdomen, both legs, digits and/or nails.   - 4 mm hyperpigmented slightly asymmetric macule left lower leg.  - There are dome shaped bright red papules on the trunk and extremities.   - Multiple regular brown pigmented macules and papules are identified on the trunk and extremities.   - Scattered brown macules on sun exposed  areas.  - There are waxy stuck on tan to brown papules on the trunk and extremities.   - No other lesions of concern on areas examined.                 Medications:  Current Outpatient Medications   Medication     betamethasone dipropionate (DIPROSONE) 0.05 % external cream     losartan (COZAAR) 100 MG tablet     No current facility-administered medications for this visit.      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

## 2022-04-12 NOTE — PATIENT INSTRUCTIONS

## 2022-04-12 NOTE — LETTER
4/12/2022         RE: Kristen Bernstein  [address] [facility] [address]        Dear Colleague,    Thank you for referring your patient, Kristen Bernstein, to the [facility]. Please see a copy of my visit note below.    [facility] Dermatology Note  Encounter Date: Apr 12, 2022  Office Visit     Dermatology Problem List:  0. Skin cancer screening 4/12/22  1. History of dysplastic and atypical nevi  2. Irritant dermatitis  - Betamethasone 0.05% cream  3. NUB, left lower leg s/p shave 4/12/2022   ____________________________________________    Assessment & Plan:    # Neoplasm of uncertain behavior, left lower leg. R/o atypia  - Shave biopsy performed today, see note below.    # History of dysplastic nevi  - Continue photoprotection  - Continue yearly skin exams  - Advised to monitor for changing, non-healing, bleeding, painful, changing, or otherwise symptomatic lesions    # Benign lesions: Multiple benign nevi, solar lentigines, seborrheic keratoses, cherry angiomas.   Explained to patient benign nature of lesion. No treatment is necessary at this time unless the lesion changes or becomes symptomatic.   - ABCDs of melanoma were discussed and self skin checks were advised.  - Sun precaution was advised including the use of sun screens of SPF 30 or higher, sun protective clothing, and avoidance of tanning beds.      Procedures Performed:   - Shave biopsy procedure note, location(s): see above. After discussion of benefits and risks including but not limited to bleeding, infection, scar, incomplete removal, recurrence, and non-diagnostic biopsy, written consent and photographs were obtained. The area was cleaned with isopropyl alcohol. 0.5mL of 1% lidocaine with epinephrine was injected to obtain adequate anesthesia of lesion(s). Shave biopsy at site(s) performed. Hemostasis was achieved with aluminium chloride. Petrolatum ointment and a sterile dressing were  applied. The patient tolerated the procedure and no complications were noted. The patient was provided with verbal and written post care instructions.     Follow-up: pending path results    Staff and Scribe:     Scribe Disclosure:  I, Yanni Black, am serving as a scribe to document services personally performed by Saskia Oliveros PA-C based on data collection and the provider's statements to me.     Provider Disclosure:   The documentation recorded by the scribe accurately reflects the services I personally performed and the decisions made by me.    All risks, benefits and alternatives were discussed with patient.  Patient is in agreement and understands the assessment and plan.  All questions were answered.  Sun Screen Education was given.   Return to Clinic in 6 months or sooner as needed.   Saskia Oliveros PA-C   [facility]   ____________________________________________    CC: Derm Problem (6 month FBSC)    HPI:  Ms. Kristen Bernstein is a(n) 56 year old female who presents today as a return patient for FBSE. The patient was last seen in dermatology on 10/21/21 by myself, at which time she had a benign skin exam.    Today, the patient denies any new or particularly concerning spots that she has noticed. She states that she has a lot of spots, however, and is not completely sure if any of them have changed.    While here, the patient states that her scalp has been itchy lately. She has started using Free & Clear shampoo because of this. Patient denies noticeable flakiness or other symptoms, just itching.     Patient is otherwise feeling well, without additional skin concerns.    Labs Reviewed:  N/A    Physical Exam:  Vitals: /60   LMP 05/15/2015 (Exact Date)   SKIN: Total skin excluding the undergarment areas was performed. The exam included the head/face, neck, both arms, chest, back, abdomen, both legs, digits and/or nails.   - 4 mm hyperpigmented slightly  asymmetric macule left lower leg.  - There are dome shaped bright red papules on the trunk and extremities.   - Multiple regular brown pigmented macules and papules are identified on the trunk and extremities.   - Scattered brown macules on sun exposed areas.  - There are waxy stuck on tan to brown papules on the trunk and extremities.   - No other lesions of concern on areas examined.                 Medications:  Current Outpatient Medications   Medication     betamethasone dipropionate (DIPROSONE) 0.05 % external cream     losartan (COZAAR) 100 MG tablet     No current facility-administered medications for this visit.      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        Sincerely,        Saskia Oliveros PA-C

## 2022-04-14 LAB
PATH REPORT.COMMENTS IMP SPEC: NORMAL
PATH REPORT.COMMENTS IMP SPEC: NORMAL
PATH REPORT.FINAL DX SPEC: NORMAL
PATH REPORT.GROSS SPEC: NORMAL
PATH REPORT.MICROSCOPIC SPEC OTHER STN: NORMAL
PATH REPORT.RELEVANT HX SPEC: NORMAL

## 2022-08-17 ENCOUNTER — ANCILLARY PROCEDURE (OUTPATIENT)
Dept: MAMMOGRAPHY | Facility: CLINIC | Age: 57
End: 2022-08-17
Attending: FAMILY MEDICINE
Payer: COMMERCIAL

## 2022-08-17 DIAGNOSIS — Z12.31 VISIT FOR SCREENING MAMMOGRAM: ICD-10-CM

## 2022-08-17 PROCEDURE — 77063 BREAST TOMOSYNTHESIS BI: CPT | Mod: TC | Performed by: RADIOLOGY

## 2022-08-17 PROCEDURE — 77067 SCR MAMMO BI INCL CAD: CPT | Mod: TC | Performed by: RADIOLOGY

## 2022-10-10 NOTE — PROGRESS NOTES
[facility] Dermatology Note  Encounter Date: Oct 11, 2022  Office Visit     Dermatology Problem List:  1. History of melanoma  - Early melanocytic proliferation (treated as melanoma in situ), left medial distal thigh s/p WLE 1/21/2019  2. History of dysplastic and atypical nevi  - Compound dysplastic nevus with mild atypia, left leg s/p shave biopsy 1/3/2019  3. Seborrheic dermatitis  - Current tx: ketoconazole 2% shampoo, betamethasone 0.05% cream    Last FBSE: 10/11/2022  ____________________________________________    Assessment & Plan:    # Seborrheic dermatitis,  - Start ketoconazole 2% shampoo every other day, alternating with current OTC Dove shampoo.  - Continue betamethasone 0.05% cream BID PRN to affected areas on the scalp or neck/shoulders; refilled today    # Solar lentigines  # Multiple benign nevi   - No concerning lesions today  - Monitor for ABCDEs of melanoma   - Continue sun protection - recommend SPF 30 or higher with frequent application   - Return sooner if noticing changing or symptomatic lesions    # Cherry angioma  # Seborrheic keratosis  Discussed the natural history and benign nature of this lesion. Reassurance provided that no additional treatment is necessary.    # History of melanoma. No evidence of recurrence.  # History of atypical nevus  - Continue skin exams every 6 months.    Procedures Performed:   None    Follow-up: 6 month(s) in-person, or earlier for new or changing lesions    Staff and Scribe:     Scribe Disclosure:  I, Jayesh Manuel, am serving as a scribe to document services personally performed by Saskia Oliveros PA-C based on data collection and the provider's statements to me.     Provider Disclosure:   The documentation recorded by the scribe accurately reflects the services I personally performed and the decisions made by me.    All risks, benefits and alternatives were discussed with patient.  Patient is in agreement and understands the  assessment and plan.  All questions were answered.  Sun Screen Education was given.   Return to Clinic in 6 months or sooner as needed.   Saskia Oliveros PA-C   [facility]   ____________________________________________    CC: Skin Check    HPI:  Ms. Kristen Bernstein is a(n) 57 year old female who presents today as a return patient for a FBSE. Last seen in dermatology by me on 4/12/2022, at which time patient underwent a shave biopsy to a NUB on the left lower leg revealing a solar lentigo with papillary dermal pigment incontinence.    Today, the patient does not note any spots of concern aside from a slightly itchy patch above her right eyebrow. She also notes an itchy scalp and uses various OTC topicals/soaps such as Dove anti-dandruff shampoo, which helps. It is generally worse in the morning and localizes to the right side and top of her scalp.    Patient is otherwise feeling well, without additional skin concerns.    Labs Reviewed:  N/A    Physical Exam:  Vitals: LMP 05/15/2015 (Exact Date)   SKIN: Total skin excluding the undergarment areas was performed. The exam included the head/face, neck, both arms, chest, back, abdomen, both legs, digits and/or nails.   - Mild erythema noted to the scalp with a faint scaly plaque on the right occipital scalp.  - There are dome shaped bright red papules on the trunk and extremities.   - Multiple regular brown pigmented macules and papules are identified on the trunk and extremities.   - Scattered brown macules on sun exposed areas.  - There are waxy stuck on tan to brown papules on the trunk and extremities.  - No other lesions of concern on areas examined.     Medications:  Current Outpatient Medications   Medication     betamethasone dipropionate (DIPROSONE) 0.05 % external cream     losartan (COZAAR) 100 MG tablet     No current facility-administered medications for this visit.      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 on close of this encounter.

## 2022-10-11 ENCOUNTER — OFFICE VISIT (OUTPATIENT)
Dept: FAMILY MEDICINE | Facility: CLINIC | Age: 57
End: 2022-10-11
Payer: COMMERCIAL

## 2022-10-11 DIAGNOSIS — L21.9 SEBORRHEIC DERMATITIS: Primary | ICD-10-CM

## 2022-10-11 DIAGNOSIS — D22.9 MULTIPLE PIGMENTED NEVI: ICD-10-CM

## 2022-10-11 DIAGNOSIS — L81.4 SOLAR LENTIGINOSIS: ICD-10-CM

## 2022-10-11 DIAGNOSIS — L24.9 IRRITANT DERMATITIS: ICD-10-CM

## 2022-10-11 DIAGNOSIS — D18.01 CHERRY ANGIOMA: ICD-10-CM

## 2022-10-11 DIAGNOSIS — L82.1 SEBORRHEIC KERATOSIS: ICD-10-CM

## 2022-10-11 DIAGNOSIS — Z86.006 HISTORY OF MELANOMA IN SITU: ICD-10-CM

## 2022-10-11 DIAGNOSIS — Z12.83 SKIN CANCER SCREENING: ICD-10-CM

## 2022-10-11 PROCEDURE — 99213 OFFICE O/P EST LOW 20 MIN: CPT | Performed by: PHYSICIAN ASSISTANT

## 2022-10-11 RX ORDER — KETOCONAZOLE 20 MG/ML
SHAMPOO TOPICAL
Qty: 120 ML | Refills: 11 | Status: SHIPPED | OUTPATIENT
Start: 2022-10-11 | End: 2023-04-18

## 2022-10-11 RX ORDER — BETAMETHASONE DIPROPIONATE 0.5 MG/G
CREAM TOPICAL
Qty: 45 G | Refills: 3 | Status: SHIPPED | OUTPATIENT
Start: 2022-10-11 | End: 2023-04-25

## 2022-10-11 ASSESSMENT — PAIN SCALES - GENERAL: PAINLEVEL: NO PAIN (0)

## 2022-10-11 NOTE — PATIENT INSTRUCTIONS
Patient Education     Checking for Skin Cancer  You can find cancer early by checking your skin each month. There are 3 kinds of skin cancer. They are melanoma, basal cell carcinoma, and squamous cell carcinoma. Doing monthly skin checks is the best way to find new marks or skin changes. Follow the instructions below for checking your skin.   The ABCDEs of checking moles for melanoma   Check your moles or growths for signs of melanoma using ABCDE:   Asymmetry: the sides of the mole or growth don t match  Border: the edges are ragged, notched, or blurred  Color: the color within the mole or growth varies  Diameter: the mole or growth is larger than 6 mm (size of a pencil eraser)  Evolving: the size, shape, or color of the mole or growth is changing (evolving is not shown in the images below)    Checking for other types of skin cancer  Basal cell carcinoma or squamous cell carcinoma have symptoms such as:     A spot or mole that looks different from all other marks on your skin  Changes in how an area feels, such as itching, tenderness, or pain  Changes in the skin's surface, such as oozing, bleeding, or scaliness  A sore that does not heal  New swelling or redness beyond the border of a mole    Who s at risk?  Anyone can get skin cancer. But you are at greater risk if you have:   Fair skin, light-colored hair, or light-colored eyes  Many moles or abnormal moles on your skin  A history of sunburns from sunlight or tanning beds  A family history of skin cancer  A history of exposure to radiation or chemicals  A weakened immune system  If you have had skin cancer in the past, you are at risk for recurring skin cancer.   How to check your skin  Do your monthly skin checkups in front of a full-length mirror. Check all parts of your body, including your:   Head (ears, face, neck, and scalp)  Torso (front, back, and sides)  Arms (tops, undersides, upper, and lower armpits)  Hands (palms, backs, and fingers, including  under the nails)  Buttocks and genitals  Legs (front, back, and sides)  Feet (tops, soles, toes, including under the nails, and between toes)  If you have a lot of moles, take digital photos of them each month. Make sure to take photos both up close and from a distance. These can help you see if any moles change over time.   Most skin changes are not cancer. But if you see any changes in your skin, call your doctor right away. Only he or she can diagnose a problem. If you have skin cancer, seeing your doctor can be the first step toward getting the treatment that could save your life.   [employer] last reviewed this educational content on 4/1/2019 2000-2020 [employer] [address] [place] [address], [place] [address]. All rights reserved. This information is not intended as a substitute for professional medical care. Always follow your healthcare professional's instructions.       When should I call my doctor?  If you are worsening or not improving, please, contact us or seek urgent care as noted below.     Who should I call with questions (adults)?  [facility] (adult and pediatric): [phone]  [facility] (adult): [phone]  For urgent needs outside of business hours call the [facility] at [phone] and ask for the dermatology resident on call to be paged  If this is a medical emergency and you are unable to reach an ER, Call [phone]    Who should I call with questions (pediatric)?  [facility]- Pediatric Dermatology  Dr. Eden Aguirre, Dr. Ruiz Victoria, Dr. Evette Patel, DRU Michelle, Dr. Luisana Garcia, Dr. Татьяна Guzman & Dr. Cali Alvarado  Non-urgent nurse triage line; [phone]- [facility] DAY Care Coordinatoraysha Jimenez (/Complex ) [phone]    If you need a prescription refill, please contact your pharmacy. Refills are approved or denied by our  Physicians during normal business hours, Monday through Fridays  Per office policy, refills will not be granted if you have not been seen within the past year (or sooner depending on your child's condition)    Scheduling Information:  Pediatric Appointment Scheduling and Call Center [phone]  Radiology Scheduling- [phone]  Sedation Unit Scheduling- [phone]  [facility] Scheduling- General [phone]; Pediatric Dermatology [phone]  Main  Services: [phone]  Japanese: [phone]  Palauan: [phone]  Hmong/Greenlandic/Portuguese: [phone]  Preadmission Nursing Department Fax Number: [phone] (Fax all pre-operative paperwork to this number)    For urgent matters arising during evenings, weekends, or holidays that cannot wait for normal business hours please call [phone] and ask for the dermatology resident on call to be paged.

## 2022-10-11 NOTE — LETTER
10/11/2022         RE: Kristen Bernstein  [address] [facility] [place] [address]        Dear Colleague,    Thank you for referring your patient, Kristen Bernstein, to the [facility] [place]. Please see a copy of my visit note below.    [facility] Dermatology Note  Encounter Date: Oct 11, 2022  Office Visit     Dermatology Problem List:  1. History of melanoma  - Early melanocytic proliferation (treated as melanoma in situ), left medial distal thigh s/p WLE 1/21/2019  2. History of dysplastic and atypical nevi  - Compound dysplastic nevus with mild atypia, left leg s/p shave biopsy 1/3/2019  3. Seborrheic dermatitis  - Current tx: ketoconazole 2% shampoo, betamethasone 0.05% cream    Last FBSE: 10/11/2022  ____________________________________________    Assessment & Plan:    # Seborrheic dermatitis,  - Start ketoconazole 2% shampoo every other day, alternating with current OTC Dove shampoo.  - Continue betamethasone 0.05% cream BID PRN to affected areas on the scalp or neck/shoulders; refilled today    # Solar lentigines  # Multiple benign nevi   - No concerning lesions today  - Monitor for ABCDEs of melanoma   - Continue sun protection - recommend SPF 30 or higher with frequent application   - Return sooner if noticing changing or symptomatic lesions    # Cherry angioma  # Seborrheic keratosis  Discussed the natural history and benign nature of this lesion. Reassurance provided that no additional treatment is necessary.    # History of melanoma. No evidence of recurrence.  # History of atypical nevus  - Continue skin exams every 6 months.    Procedures Performed:   None    Follow-up: 6 month(s) in-person, or earlier for new or changing lesions    Staff and Scribe:     Scribe Disclosure:  Jayesh ASCENCIO, am serving as a scribe to document services personally performed by Saskia Oliveros PA-C based on data collection and the provider's statements to me.     Provider  Disclosure:   The documentation recorded by the scribe accurately reflects the services I personally performed and the decisions made by me.    All risks, benefits and alternatives were discussed with patient.  Patient is in agreement and understands the assessment and plan.  All questions were answered.  Sun Screen Education was given.   Return to Clinic in 6 months or sooner as needed.   Saskia Oliveros PA-C   [facility]   ____________________________________________    CC: Skin Check    HPI:  Ms. Kristen Bernstein is a(n) 57 year old female who presents today as a return patient for a FBSE. Last seen in dermatology by me on 4/12/2022, at which time patient underwent a shave biopsy to a NUB on the left lower leg revealing a solar lentigo with papillary dermal pigment incontinence.    Today, the patient does not note any spots of concern aside from a slightly itchy patch above her right eyebrow. She also notes an itchy scalp and uses various OTC topicals/soaps such as Dove anti-dandruff shampoo, which helps. It is generally worse in the morning and localizes to the right side and top of her scalp.    Patient is otherwise feeling well, without additional skin concerns.    Labs Reviewed:  N/A    Physical Exam:  Vitals: LMP 05/15/2015 (Exact Date)   SKIN: Total skin excluding the undergarment areas was performed. The exam included the head/face, neck, both arms, chest, back, abdomen, both legs, digits and/or nails.   - Mild erythema noted to the scalp with a faint scaly plaque on the right occipital scalp.  - There are dome shaped bright red papules on the trunk and extremities.   - Multiple regular brown pigmented macules and papules are identified on the trunk and extremities.   - Scattered brown macules on sun exposed areas.  - There are waxy stuck on tan to brown papules on the trunk and extremities.  - No other lesions of concern on areas examined.     Medications:  Current  Outpatient Medications   Medication     betamethasone dipropionate (DIPROSONE) 0.05 % external cream     losartan (COZAAR) 100 MG tablet     No current facility-administered medications for this visit.      Past Medical History:   Patient Active Problem List   Diagnosis     Hypertension goal BP (blood pressure) < 140/90     Family history of colon cancer     GERD (gastroesophageal reflux disease)     CARDIOVASCULAR SCREENING; LDL GOAL LESS THAN 130     History of melanoma in situ - atypical melanocytic proliferation best treated as an early evolving melanoma in situ     Past Medical History:   Diagnosis Date     GERD      Rash and other nonspecific skin eruption (aka PRURITIS)      Unspecified essential hypertension         CC No referring provider defined for this encounter. on close of this encounter.      Again, thank you for allowing me to participate in the care of your patient.        Sincerely,        Saskia Oliveros PA-C

## 2022-11-11 ENCOUNTER — E-VISIT (OUTPATIENT)
Dept: URGENT CARE | Facility: URGENT CARE | Age: 57
End: 2022-11-11
Payer: COMMERCIAL

## 2022-11-11 DIAGNOSIS — N39.0 ACUTE UTI (URINARY TRACT INFECTION): Primary | ICD-10-CM

## 2022-11-11 PROCEDURE — 99421 OL DIG E/M SVC 5-10 MIN: CPT | Performed by: PHYSICIAN ASSISTANT

## 2022-11-11 RX ORDER — NITROFURANTOIN 25; 75 MG/1; MG/1
100 CAPSULE ORAL 2 TIMES DAILY
Qty: 10 CAPSULE | Refills: 0 | Status: SHIPPED | OUTPATIENT
Start: 2022-11-11 | End: 2022-11-16

## 2022-11-11 NOTE — PATIENT INSTRUCTIONS
Dear Kristen Bernstein    After reviewing your responses, I've been able to diagnose you with a urinary tract infection, which is a common infection of the bladder with bacteria.  This is not a sexually transmitted infection, though urinating immediately after intercourse can help prevent infections.  Drinking lots of fluids is also helpful to clear your current infection and prevent the next one.      I have sent a prescription for antibiotics to your pharmacy to treat this infection.    It is important that you take all of your prescribed medication even if your symptoms are improving after a few doses.  Taking all of your medicine helps prevent the symptoms from returning.     If your symptoms worsen, you develop pain in your back or stomach, develop fevers, or are not improving in 5 days, please contact your primary care provider for an appointment or visit any of our convenient Walk-in or Urgent Care Centers to be seen, which can be found on our website here.    Thanks again for choosing us as your health care partner,    Clemente Ashley PA-C    Urinary Tract Infections in Women  Urinary tract infections (UTIs) are most often caused by bacteria. These bacteria enter the urinary tract. The bacteria may come from inside the body. Or they may travel from the skin outside the rectum or vagina into the urethra. Female anatomy makes it easy for bacteria from the bowel to enter a woman s urinary tract, which is the most common source of UTI. This means women develop UTIs more often than men. Pain in or around the urinary tract is a common UTI symptom. But the only way to know for sure if you have a UTI for the healthcare provider to test your urine. The two tests that may be done are the urinalysis and urine culture.     Types of UTIs    Cystitis. A bladder infection (cystitis) is the most common UTI in women. You may have urgent or frequent need to pee. You may also have pain, burning when you pee, and bloody  urine.    Urethritis. This is an inflamed urethra, which is the tube that carries urine from the bladder to outside the body. You may have lower stomach or back pain. You may also have urgent or frequent need to pee.    Pyelonephritis. This is a kidney infection. If not treated, it can be serious and damage your kidneys. In severe cases, you may need to stay in the hospital. You may have a fever and lower back pain.    Medicines to treat a UTI  Most UTIs are treated with antibiotics. These kill the bacteria. The length of time you need to take them depends on the type of infection. It may be as short as 3 days. If you have repeated UTIs, you may need a low-dose antibiotic for several months. Take antibiotics exactly as directed. Don t stop taking them until all of the medicine is gone. If you stop taking the antibiotic too soon, the infection may not go away. You may also develop a resistance to the antibiotic. This can make it much harder to treat.   Lifestyle changes to treat and prevent UTIs   The lifestyle changes below will help get rid of your UTI. They may also help prevent future UTIs.     Drink plenty of fluids. This includes water, juice, or other caffeine-free drinks. Fluids help flush bacteria out of your body.    Empty your bladder. Always empty your bladder when you feel the urge to pee. And always pee before going to sleep. Urine that stays in your bladder can lead to infection. Try to pee before and after sex as well.    Practice good personal hygiene. Wipe yourself from front to back after using the toilet. This helps keep bacteria from getting into the urethra.    Use condoms during sex. These help prevent UTIs caused by sexually transmitted bacteria. Also don't use spermicides during sex. These can increase the risk for UTIs. Choose other forms of birth control instead. For women who tend to get UTIs after sex, a low-dose of a preventive antibiotic may be used. Be sure to discuss this option with  your healthcare provider.    Follow up with your healthcare provider as directed. He or she may test to make sure the infection has cleared. If needed, more treatment may be started.  [employer] last reviewed this educational content on 7/1/2019 2000-2021 [employer] All rights reserved. This information is not intended as a substitute for professional medical care. Always follow your healthcare professional's instructions.          Understanding Urinary Tract Infections (UTIs)   Most UTIs are caused by bacteria, but they may also be caused by viruses or fungi. Bacteria from the bowel are the most common source of infection. The infection may start because of any of the following:     Sexual activity.  During sex, bacteria can travel from the penis, vagina, or rectum into the urethra.     Bacteria outside the rectum getting into the urethra.  Bacteria on the skin outside the rectum may travel into the urethra. This is more common in women since the rectum and urethra are closer to each other than in men. Wiping from front to back after using the toilet and keeping the area clean can help prevent germs from getting to the urethra.    Blocked urine flow through the urinary tract. If urine sits too long, germs may start to grow out of control.  Parts of the urinary tract  The infection can occur in any part of the urinary tract.       The kidneys. These organs collect and store urine.    The ureters. These tubes carry urine from the kidneys to the bladder.    The bladder. This holds urine until you are ready to let it out.    The urethra. This tube carries urine from the bladder out of the body. It is shorter in women, so bacteria can move through it more easily. The urethra is longer in men, so a UTI is less likely to reach the bladder or kidneys in men.  [employer] last reviewed this educational content on 1/1/2020 2000-2021 [employer] All rights reserved. This information is not  intended as a substitute for professional medical care. Always follow your healthcare professional's instructions.

## 2023-01-08 ENCOUNTER — HEALTH MAINTENANCE LETTER (OUTPATIENT)
Age: 58
End: 2023-01-08

## 2023-04-17 NOTE — PROGRESS NOTES
[facility] Dermatology Note  Encounter Date: Apr 18, 2023  Office Visit     Dermatology Problem List:  0. NUB, L lower back s/p shave biopsy 4/18/2023  0. LTM, R lower cheek (photo 4/18/2023)  1. History of melanoma  - Early melanocytic proliferation (treated as MIS), L medial distal thigh s/p WLE 1/21/2019  2. History of dysplastic nevus  - Mild atypia, L leg s/p shave biopsy 1/3/2019  3. Seborrheic dermatitis  - Current tx: fluocinonide 0.05% solution, ketoconazole 2% cream, triamcinolone 0.025% cream  - Previous tx: ketoconazole 2% shampoo, betamethasone 0.05% cream  4. Solar lentigo, L lower leg s/p shave biopsy 4/12/2022     Last FBSE: 4/18/2023  ____________________________________________    Assessment & Plan:    # NUB, L lower back ddx r/o dysplasia  - Shave biopsy today; see procedure note below.    # Lesion to monitor (macular sk), R lower cheek  - Photodocumentation today.  - Patient to monitor for future changes.    # Seborrheic dermatitis. Chronic, not well controlled.  - Start fluocinonide 0.05% solution BID PRN to affected areas on the scalp.  - Start ketoconazole 2% cream mixed 1:1 triamcinolone 0.025% cream BID PRN to affected areas on the right eyebrow     # Cherry angioma  # Seborrheic keratosis  Discussed the natural history and benign nature of this lesion. Reassurance provided that no additional treatment is necessary.    # Solar lentigines  # Multiple benign nevi   - No concerning lesions today  - Monitor for ABCDEs of melanoma   - Continue sun protection - recommend SPF 30 or higher with frequent application   - Return sooner if noticing changing or symptomatic lesions     # History of melanoma. No evidence of recurrent disease.  # History of dysplastic nevus  - ABCDEs of melanoma advised  - Continue photoprotection  - Continue yearly skin exams  - Advised to monitor for changing, non-healing, bleeding, painful, changing, or otherwise symptomatic lesions    Procedures  Performed:   - Shave biopsy procedure note, location(s): see above. After discussion of benefits and risks including but not limited to bleeding, infection, scar, incomplete removal, recurrence, and non-diagnostic biopsy, written consent and photographs were obtained. The area was cleaned with isopropyl alcohol. 0.5mL of 1% lidocaine with epinephrine was injected to obtain adequate anesthesia of lesion(s). Shave biopsy at site(s) performed. Hemostasis was achieved with aluminium chloride. Petrolatum ointment and a sterile dressing were applied. The patient tolerated the procedure and no complications were noted. The patient was provided with verbal and written post care instructions.     Follow-up: 6 month(s) in-person, or earlier pending pathology or for new or changing lesions    Staff and Scribe:     Scribe Disclosure:  IJayesh, am serving as a scribe to document services personally performed by Saskia Oliveros PA-C based on data collection and the provider's statements to me.     Provider Disclosure:   The documentation recorded by the scribe accurately reflects the services I personally performed and the decisions made by me.    All risks, benefits and alternatives were discussed with patient.  Patient is in agreement and understands the assessment and plan.  All questions were answered.  Sun Screen Education was given.   Return to Clinic in 6 months or sooner as needed.   Saskia Oliveros PA-C   [facility]   ____________________________________________    CC: Skin Check    HPI:  Ms. Kristen Bernstein is a(n) 57 year old female who presents today as a return patient for a FBSE. Last seen in dermatology by me on 10/11/2022, at which time patient was started on ketoconazole 2% shampoo for treatment of seborrheic dermatitis.    Today, patient endorses persistent dryness on her right eyebrow. Her scalp also continues to itch, even with ketoconazole shampoo use for a  couple of months. She has not been using betamethasone because she was unsure if it was safe for her face.    Patient is otherwise feeling well, without additional skin concerns.    Labs Reviewed:  N/A    Physical Exam:  Vitals: LMP 05/15/2015 (Exact Date)   SKIN: Total skin excluding the undergarment areas was performed. The exam included the head/face, neck, both arms, chest, back, abdomen, both legs, digits and/or nails.   LYMPH: There is no cervical, supraclavicular, axillary, or inguinal lymphadenopathy.  - 5 mm asymmetric pink and brown macule on the L lower back.  - 1.1 cm brown patch on the R lower cheek.  - Faint pink scaly plaques on the vertex scalp, lower occipital scalp, and R eyebrow.  - There are dome shaped bright red papules on the trunk and extremities.   - Multiple regular brown pigmented macules and papules are identified on the trunk and extremities.   - Scattered brown macules on sun exposed areas.  - There are waxy stuck on tan to brown papules on the trunk and extremities.  - No other lesions of concern on areas examined.             Medications:  Current Outpatient Medications   Medication     betamethasone dipropionate (DIPROSONE) 0.05 % external cream     losartan (COZAAR) 100 MG tablet     ketoconazole (NIZORAL) 2 % external shampoo     No current facility-administered medications for this visit.      Past Medical History:   Patient Active Problem List   Diagnosis     Hypertension goal BP (blood pressure) < 140/90     Family history of colon cancer     GERD (gastroesophageal reflux disease)     CARDIOVASCULAR SCREENING; LDL GOAL LESS THAN 130     History of melanoma in situ - atypical melanocytic proliferation best treated as an early evolving melanoma in situ     Past Medical History:   Diagnosis Date     GERD      Rash and other nonspecific skin eruption (aka PRURITIS)      Unspecified essential hypertension         CC No referring provider defined for this encounter. on close of this  encounter.

## 2023-04-18 ENCOUNTER — OFFICE VISIT (OUTPATIENT)
Dept: FAMILY MEDICINE | Facility: CLINIC | Age: 58
End: 2023-04-18
Payer: COMMERCIAL

## 2023-04-18 DIAGNOSIS — L21.9 SEBORRHEIC DERMATITIS: Primary | ICD-10-CM

## 2023-04-18 DIAGNOSIS — Z12.83 SKIN CANCER SCREENING: ICD-10-CM

## 2023-04-18 DIAGNOSIS — Z85.820 HISTORY OF MELANOMA: ICD-10-CM

## 2023-04-18 DIAGNOSIS — D22.9 MULTIPLE BENIGN NEVI: ICD-10-CM

## 2023-04-18 DIAGNOSIS — Z86.018 HISTORY OF DYSPLASTIC NEVUS: ICD-10-CM

## 2023-04-18 DIAGNOSIS — L81.4 SOLAR LENTIGO: ICD-10-CM

## 2023-04-18 DIAGNOSIS — D18.01 CHERRY ANGIOMA: ICD-10-CM

## 2023-04-18 DIAGNOSIS — L82.1 SEBORRHEIC KERATOSIS: ICD-10-CM

## 2023-04-18 DIAGNOSIS — D49.2 NEOPLASM OF UNSPECIFIED BEHAVIOR OF BONE, SOFT TISSUE, AND SKIN: ICD-10-CM

## 2023-04-18 PROCEDURE — 11102 TANGNTL BX SKIN SINGLE LES: CPT | Performed by: PHYSICIAN ASSISTANT

## 2023-04-18 PROCEDURE — 88305 TISSUE EXAM BY PATHOLOGIST: CPT | Performed by: DERMATOLOGY

## 2023-04-18 PROCEDURE — 99214 OFFICE O/P EST MOD 30 MIN: CPT | Mod: 25 | Performed by: PHYSICIAN ASSISTANT

## 2023-04-18 RX ORDER — TRIAMCINOLONE ACETONIDE 0.25 MG/G
CREAM TOPICAL 2 TIMES DAILY
Qty: 15 G | Refills: 1 | Status: SHIPPED | OUTPATIENT
Start: 2023-04-18 | End: 2024-04-30

## 2023-04-18 RX ORDER — KETOCONAZOLE 20 MG/G
CREAM TOPICAL 2 TIMES DAILY
Qty: 15 G | Refills: 1 | Status: SHIPPED | OUTPATIENT
Start: 2023-04-18 | End: 2024-04-30

## 2023-04-18 RX ORDER — FLUOCINONIDE TOPICAL SOLUTION USP, 0.05% 0.5 MG/ML
SOLUTION TOPICAL 2 TIMES DAILY
Qty: 60 ML | Refills: 3 | Status: SHIPPED | OUTPATIENT
Start: 2023-04-18 | End: 2024-04-30

## 2023-04-18 ASSESSMENT — PAIN SCALES - GENERAL: PAINLEVEL: NO PAIN (0)

## 2023-04-18 NOTE — PATIENT INSTRUCTIONS
Wound Care After a Biopsy    What is a skin biopsy?  A skin biopsy allows the doctor to examine a very small piece of tissue under the microscope to determine the diagnosis and the best treatment for the skin condition. A local anesthetic (numbing medicine)  is injected with a very small needle into the skin area to be tested. A small piece of skin is taken from the area. Sometimes a suture (stitch) is used.     What are the risks of a skin biopsy?  I will experience scar, bleeding, swelling, pain, crusting and redness. I may experience incomplete removal or recurrence. Risks of this procedure are excessive bleeding, bruising, infection, nerve damage, numbness, thick (hypertrophic or keloidal) scar and non-diagnostic biopsy.    How should I care for my wound for the first 24 hours?  Keep the wound dry and covered for 24 hours  If it bleeds, hold direct pressure on the area for 15 minutes. If bleeding does not stop then go to the emergency room  Avoid strenuous exercise the first 1-2 days or as your doctor instructs you    How should I care for the wound after 24 hours?  After 24 hours, remove the bandage  You may bathe or shower as normal  If you had a scalp biopsy, you can shampoo as usual and can use shower water to clean the biopsy site daily  Clean the wound twice a day with gentle soap and water  Do not scrub, be gentle  Apply white petroleum/Vaseline after cleaning the wound with a cotton swab or a clean finger, and keep the site covered with a Bandaid /bandage. Bandages are not necessary with a scalp biopsy  If you are unable to cover the site with a Bandaid /bandage, re-apply ointment 2-3 times a day to keep the site moist. Moisture will help with healing  Avoid strenuous activity for first 1-2 days  Avoid lakes, rivers, pools, and oceans until the stitches are removed or the site is healed    How do I clean my wound?  Wash hands thoroughly with soap or use hand  before all wound care  Clean the  wound with gentle soap and water  Apply white petroleum/Vaseline  to wound after it is clean  Replace the Bandaid /bandage to keep the wound covered for the first few days or as instructed by your doctor  If you had a scalp biopsy, warm shower water to the area on a daily basis should suffice    What should I use to clean my wound?   Cotton-tipped applicators (Qtips )  White petroleum jelly (Vaseline ). Use a clean new container and use Q-tips to apply.  Bandaids   as needed  Gentle soap     How should I care for my wound long term?  Do not get your wound dirty  Keep up with wound care for one week or until the area is healed.  A small scab will form and fall off by itself when the area is completely healed. The area will be red and will become pink in color as it heals. Sun protection is very important for how your scar will turn out. Sunscreen with an SPF 30 or greater is recommended once the area is healed.  You should have some soreness but it should be mild and slowly go away over several days. Talk to your doctor about using tylenol for pain,    When should I call my doctor?  If you have increased:   Pain or swelling  Pus or drainage (clear or slightly yellow drainage is ok)  Temperature over 100F  Spreading redness or warmth around wound    When will I hear about my results?  The biopsy results can take 2 weeks to come back.  Your results will automatically release to [facility] [facility] before your provider has even reviewed them.  The clinic will call you with the results, send you a [employer] message, or have you schedule a follow-up clinic or phone time to discuss the results.  Contact our clinics if you do not hear from us in 2 weeks.    Who should I call with questions?  [facility]: [phone]  [facility]: [phone]  For urgent needs outside of business hours call the [facility] at [phone] and ask for the dermatology resident on call      Patient Education     Checking for Skin Cancer  You can find cancer early by checking your skin each month. There are 3 kinds of skin cancer. They are melanoma, basal cell carcinoma, and squamous cell carcinoma. Doing monthly skin checks is the best way to find new marks or skin changes. Follow the instructions below for checking your skin.   The ABCDEs of checking moles for melanoma   Check your moles or growths for signs of melanoma using ABCDE:   Asymmetry: the sides of the mole or growth don t match  Border: the edges are ragged, notched, or blurred  Color: the color within the mole or growth varies  Diameter: the mole or growth is larger than 6 mm (size of a pencil eraser)  Evolving: the size, shape, or color of the mole or growth is changing (evolving is not shown in the images below)    Checking for other types of skin cancer  Basal cell carcinoma or squamous cell carcinoma have symptoms such as:     A spot or mole that looks different from all other marks on your skin  Changes in how an area feels, such as itching, tenderness, or pain  Changes in the skin's surface, such as oozing, bleeding, or scaliness  A sore that does not heal  New swelling or redness beyond the border of a mole    Who s at risk?  Anyone can get skin cancer. But you are at greater risk if you have:   Fair skin, light-colored hair, or light-colored eyes  Many moles or abnormal moles on your skin  A history of sunburns from sunlight or tanning beds  A family history of skin cancer  A history of exposure to radiation or chemicals  A weakened immune system  If you have had skin cancer in the past, you are at risk for recurring skin cancer.   How to check your skin  Do your monthly skin checkups in front of a full-length mirror. Check all parts of your body, including your:   Head (ears, face, neck, and scalp)  Torso (front, back, and sides)  Arms (tops, undersides, upper, and lower armpits)  Hands (palms, backs, and fingers, including  under the nails)  Buttocks and genitals  Legs (front, back, and sides)  Feet (tops, soles, toes, including under the nails, and between toes)  If you have a lot of moles, take digital photos of them each month. Make sure to take photos both up close and from a distance. These can help you see if any moles change over time.   Most skin changes are not cancer. But if you see any changes in your skin, call your doctor right away. Only he or she can diagnose a problem. If you have skin cancer, seeing your doctor can be the first step toward getting the treatment that could save your life.   [employer] last reviewed this educational content on 4/1/2019 2000-2020 [employer]. [address] [place] [address], [place] [address]. All rights reserved. This information is not intended as a substitute for professional medical care. Always follow your healthcare professional's instructions.       When should I call my doctor?  If you are worsening or not improving, please, contact us or seek urgent care as noted below.     Who should I call with questions (adults)?  [facility] (adult and pediatric): [phone]  [facility] (adult): [phone]  For urgent needs outside of business hours call the [facility] at [phone] and ask for the dermatology resident on call to be paged  If this is a medical emergency and you are unable to reach an ER, Call [phone]    Who should I call with questions (pediatric)?  [facility]- Pediatric Dermatology  Dr. Eden Aguirre, Dr. Ruiz Victoria, Dr. Evette Patel, DRU Michelle, Dr. Luisana Garcia, Dr. Татьяна Guzman & Dr. Cali Alvarado  Non-urgent nurse triage line; [phone]- [facility] [place] DAY Care Coordinatoraysha Jimenez (/Complex ) [phone]    If you need a prescription refill, please contact your pharmacy. Refills are approved or denied by our  Physicians during normal business hours, Monday through Fridays  Per office policy, refills will not be granted if you have not been seen within the past year (or sooner depending on your child's condition)    Scheduling Information:  Pediatric Appointment Scheduling and Call Center [phone]  Radiology Scheduling- [phone]  Sedation Unit Scheduling- [phone]  [place] Scheduling- General [phone]; Pediatric Dermatology [phone]  Main  Services: [phone]  Macedonian: [phone]  Cameroonian: [phone]  Hmong/Guamanian/Danish: [phone]  Preadmission Nursing Department Fax Number: [phone] (Fax all pre-operative paperwork to this number)    For urgent matters arising during evenings, weekends, or holidays that cannot wait for normal business hours please call [phone] and ask for the dermatology resident on call to be paged.

## 2023-04-18 NOTE — LETTER
4/18/2023         RE: Kristen Bernstein  [address] [place]  [place] [address]        Dear Colleague,    Thank you for referring your patient, Kristen Bernstein, to the [facility]. Please see a copy of my visit note below.    [facility] Dermatology Note  Encounter Date: Apr 18, 2023  Office Visit     Dermatology Problem List:  0. NUB, L lower back s/p shave biopsy 4/18/2023  0. LTM, R lower cheek (photo 4/18/2023)  1. History of melanoma  - Early melanocytic proliferation (treated as MIS), L medial distal thigh s/p WLE 1/21/2019  2. History of dysplastic nevus  - Mild atypia, L leg s/p shave biopsy 1/3/2019  3. Seborrheic dermatitis  - Current tx: fluocinonide 0.05% solution, ketoconazole 2% cream, triamcinolone 0.025% cream  - Previous tx: ketoconazole 2% shampoo, betamethasone 0.05% cream  4. Solar lentigo, L lower leg s/p shave biopsy 4/12/2022     Last FBSE: 4/18/2023  ____________________________________________    Assessment & Plan:    # NUB, L lower back ddx r/o dysplasia  - Shave biopsy today; see procedure note below.    # Lesion to monitor (macular sk), R lower cheek  - Photodocumentation today.  - Patient to monitor for future changes.    # Seborrheic dermatitis. Chronic, not well controlled.  - Start fluocinonide 0.05% solution BID PRN to affected areas on the scalp.  - Start ketoconazole 2% cream mixed 1:1 triamcinolone 0.025% cream BID PRN to affected areas on the right eyebrow     # Cherry angioma  # Seborrheic keratosis  Discussed the natural history and benign nature of this lesion. Reassurance provided that no additional treatment is necessary.    # Solar lentigines  # Multiple benign nevi   - No concerning lesions today  - Monitor for ABCDEs of melanoma   - Continue sun protection - recommend SPF 30 or higher with frequent application   - Return sooner if noticing changing or symptomatic lesions     # History of melanoma. No evidence of recurrent  disease.  # History of dysplastic nevus  - ABCDEs of melanoma advised  - Continue photoprotection  - Continue yearly skin exams  - Advised to monitor for changing, non-healing, bleeding, painful, changing, or otherwise symptomatic lesions    Procedures Performed:   - Shave biopsy procedure note, location(s): see above. After discussion of benefits and risks including but not limited to bleeding, infection, scar, incomplete removal, recurrence, and non-diagnostic biopsy, written consent and photographs were obtained. The area was cleaned with isopropyl alcohol. 0.5mL of 1% lidocaine with epinephrine was injected to obtain adequate anesthesia of lesion(s). Shave biopsy at site(s) performed. Hemostasis was achieved with aluminium chloride. Petrolatum ointment and a sterile dressing were applied. The patient tolerated the procedure and no complications were noted. The patient was provided with verbal and written post care instructions.     Follow-up: 6 month(s) in-person, or earlier pending pathology or for new or changing lesions    Staff and Scribe:     Scribe Disclosure:  IJayesh, am serving as a scribe to document services personally performed by Saskia Oliveros PA-C based on data collection and the provider's statements to me.     Provider Disclosure:   The documentation recorded by the scribe accurately reflects the services I personally performed and the decisions made by me.    All risks, benefits and alternatives were discussed with patient.  Patient is in agreement and understands the assessment and plan.  All questions were answered.  Sun Screen Education was given.   Return to Clinic in 6 months or sooner as needed.   Saskia Oliveros PA-C   [facility]   ____________________________________________    CC: Skin Check    HPI:  Ms. Kristen Bernstein is a(n) 57 year old female who presents today as a return patient for a FBSE. Last seen in dermatology by me on  10/11/2022, at which time patient was started on ketoconazole 2% shampoo for treatment of seborrheic dermatitis.    Today, patient endorses persistent dryness on her right eyebrow. Her scalp also continues to itch, even with ketoconazole shampoo use for a couple of months. She has not been using betamethasone because she was unsure if it was safe for her face.    Patient is otherwise feeling well, without additional skin concerns.    Labs Reviewed:  N/A    Physical Exam:  Vitals: LMP 05/15/2015 (Exact Date)   SKIN: Total skin excluding the undergarment areas was performed. The exam included the head/face, neck, both arms, chest, back, abdomen, both legs, digits and/or nails.   LYMPH: There is no cervical, supraclavicular, axillary, or inguinal lymphadenopathy.  - 5 mm asymmetric pink and brown macule on the L lower back.  - 1.1 cm brown patch on the R lower cheek.  - Faint pink scaly plaques on the vertex scalp, lower occipital scalp, and R eyebrow.  - There are dome shaped bright red papules on the trunk and extremities.   - Multiple regular brown pigmented macules and papules are identified on the trunk and extremities.   - Scattered brown macules on sun exposed areas.  - There are waxy stuck on tan to brown papules on the trunk and extremities.  - No other lesions of concern on areas examined.             Medications:  Current Outpatient Medications   Medication     betamethasone dipropionate (DIPROSONE) 0.05 % external cream     losartan (COZAAR) 100 MG tablet     ketoconazole (NIZORAL) 2 % external shampoo     No current facility-administered medications for this visit.      Past Medical History:   Patient Active Problem List   Diagnosis     Hypertension goal BP (blood pressure) < 140/90     Family history of colon cancer     GERD (gastroesophageal reflux disease)     CARDIOVASCULAR SCREENING; LDL GOAL LESS THAN 130     History of melanoma in situ - atypical melanocytic proliferation best treated as an early  evolving melanoma in situ     Past Medical History:   Diagnosis Date     GERD      Rash and other nonspecific skin eruption (aka PRURITIS)      Unspecified essential hypertension         CC No referring provider defined for this encounter. on close of this encounter.      Again, thank you for allowing me to participate in the care of your patient.        Sincerely,        Saskia Oliveros PA-C

## 2023-04-25 ENCOUNTER — OFFICE VISIT (OUTPATIENT)
Dept: FAMILY MEDICINE | Facility: CLINIC | Age: 58
End: 2023-04-25
Payer: COMMERCIAL

## 2023-04-25 VITALS
BODY MASS INDEX: 32.2 KG/M2 | DIASTOLIC BLOOD PRESSURE: 70 MMHG | TEMPERATURE: 98.6 F | HEART RATE: 78 BPM | WEIGHT: 175 LBS | RESPIRATION RATE: 16 BRPM | OXYGEN SATURATION: 97 % | SYSTOLIC BLOOD PRESSURE: 120 MMHG | HEIGHT: 62 IN

## 2023-04-25 DIAGNOSIS — Z00.00 ANNUAL PHYSICAL EXAM: Primary | ICD-10-CM

## 2023-04-25 DIAGNOSIS — Z23 HIGH PRIORITY FOR 2019-NCOV VACCINE: ICD-10-CM

## 2023-04-25 DIAGNOSIS — Z12.11 SCREEN FOR COLON CANCER: ICD-10-CM

## 2023-04-25 DIAGNOSIS — I10 HYPERTENSION GOAL BP (BLOOD PRESSURE) < 140/90: ICD-10-CM

## 2023-04-25 LAB
ALBUMIN SERPL BCG-MCNC: 4.1 G/DL (ref 3.5–5.2)
ALP SERPL-CCNC: 126 U/L (ref 35–104)
ALT SERPL W P-5'-P-CCNC: 16 U/L (ref 10–35)
ANION GAP SERPL CALCULATED.3IONS-SCNC: 17 MMOL/L (ref 7–15)
AST SERPL W P-5'-P-CCNC: 25 U/L (ref 10–35)
BILIRUB SERPL-MCNC: 0.5 MG/DL
BUN SERPL-MCNC: 13.1 MG/DL (ref 6–20)
CALCIUM SERPL-MCNC: 9.5 MG/DL (ref 8.6–10)
CHLORIDE SERPL-SCNC: 102 MMOL/L (ref 98–107)
CHOLEST SERPL-MCNC: 179 MG/DL
CREAT SERPL-MCNC: 1.18 MG/DL (ref 0.51–0.95)
DEPRECATED HCO3 PLAS-SCNC: 21 MMOL/L (ref 22–29)
GFR SERPL CREATININE-BSD FRML MDRD: 53 ML/MIN/1.73M2
GLUCOSE SERPL-MCNC: 93 MG/DL (ref 70–99)
HDLC SERPL-MCNC: 47 MG/DL
LDLC SERPL CALC-MCNC: 110 MG/DL
NONHDLC SERPL-MCNC: 132 MG/DL
POTASSIUM SERPL-SCNC: 4.4 MMOL/L (ref 3.4–5.3)
PROT SERPL-MCNC: 7.1 G/DL (ref 6.4–8.3)
SODIUM SERPL-SCNC: 140 MMOL/L (ref 136–145)
TRIGL SERPL-MCNC: 112 MG/DL

## 2023-04-25 PROCEDURE — 99213 OFFICE O/P EST LOW 20 MIN: CPT | Mod: 25 | Performed by: FAMILY MEDICINE

## 2023-04-25 PROCEDURE — 80061 LIPID PANEL: CPT | Performed by: FAMILY MEDICINE

## 2023-04-25 PROCEDURE — 99396 PREV VISIT EST AGE 40-64: CPT | Mod: 25 | Performed by: FAMILY MEDICINE

## 2023-04-25 PROCEDURE — 80053 COMPREHEN METABOLIC PANEL: CPT | Performed by: FAMILY MEDICINE

## 2023-04-25 PROCEDURE — 36415 COLL VENOUS BLD VENIPUNCTURE: CPT | Performed by: FAMILY MEDICINE

## 2023-04-25 PROCEDURE — 91312 COVID-19 VACCINE BIVALENT BOOSTER 12+ (PFIZER): CPT | Performed by: FAMILY MEDICINE

## 2023-04-25 PROCEDURE — 0124A COVID-19 VACCINE BIVALENT BOOSTER 12+ (PFIZER): CPT | Performed by: FAMILY MEDICINE

## 2023-04-25 RX ORDER — LOSARTAN POTASSIUM 100 MG/1
TABLET ORAL
Qty: 90 TABLET | Refills: 3 | Status: SHIPPED | OUTPATIENT
Start: 2023-04-25 | End: 2024-06-04

## 2023-04-25 ASSESSMENT — ENCOUNTER SYMPTOMS
HEMATURIA: 0
HEMATOCHEZIA: 0
CONSTIPATION: 0
ABDOMINAL PAIN: 0
CHILLS: 0
COUGH: 0

## 2023-04-25 ASSESSMENT — PAIN SCALES - GENERAL: PAINLEVEL: NO PAIN (0)

## 2023-04-25 NOTE — PROGRESS NOTES
SUBJECTIVE:   CC: Kristen is an 57 year old who presents for preventive health visit.       4/25/2023     9:45 AM   Additional Questions   Roomed by KM   Patient has been advised of split billing requirements and indicates understanding: Yes  Healthy Habits:     Getting at least 3 servings of Calcium per day:  NO    Bi-annual eye exam:  Yes    Dental care twice a year:  Yes    Sleep apnea or symptoms of sleep apnea:  None    Diet:  Regular (no restrictions)    Frequency of exercise:  None    Taking medications regularly:  Yes    Medication side effects:  None    PHQ-2 Total Score: 0    Additional concerns today:  No          Hypertension Follow-up      Do you check your blood pressure regularly outside of the clinic? No     Are you following a low salt diet? Yes    Are your blood pressures ever more than 140 on the top number (systolic) OR more   than 90 on the bottom number (diastolic), for example 140/90?       Today's PHQ-2 Score:       4/25/2023     9:36 AM   PHQ-2 ( 1999 [employer])   Q1: Little interest or pleasure in doing things 0   Q2: Feeling down, depressed or hopeless 0   PHQ-2 Score 0   Q1: Little interest or pleasure in doing things Not at all    Not at all   Q2: Feeling down, depressed or hopeless Not at all    Not at all   PHQ-2 Score 0    0           Social History     Tobacco Use     Smoking status: Never     Smokeless tobacco: Never   Vaping Use     Vaping status: Not on file   Substance Use Topics     Alcohol use: Yes     Alcohol/week: 0.0 standard drinks of alcohol     Comment: 1/week             4/25/2023     9:36 AM   Alcohol Use   Prescreen: >3 drinks/day or >7 drinks/week? No     Reviewed orders with patient.  Reviewed health maintenance and updated orders accordingly - Yes  BP Readings from Last 3 Encounters:   04/25/23 120/70   04/12/22 122/60   04/06/22 126/84    Wt Readings from Last 3 Encounters:   04/25/23 79.4 kg (175 lb)   04/06/22 78.7 kg (173 lb 9.6 oz)   01/08/21 78 kg (172 lb)                   Patient Active Problem List   Diagnosis     Hypertension goal BP (blood pressure) < 140/90     Family history of colon cancer     GERD (gastroesophageal reflux disease)     CARDIOVASCULAR SCREENING; LDL GOAL LESS THAN 130     History of melanoma in situ - atypical melanocytic proliferation best treated as an early evolving melanoma in situ     Past Surgical History:   Procedure Laterality Date     COLONOSCOPY  10/12/2012    Procedure: COLONOSCOPY;  Colonoscopy, screening;  Surgeon: Kristen Alexander MD;  Location: [facility] OR     COLONOSCOPY N/A 1/22/2018    Procedure: COMBINED COLONOSCOPY, SINGLE OR MULTIPLE BIOPSY/POLYPECTOMY BY BIOPSY;;  Surgeon: Priscilla You MD;  Location:  GI     UPPER GI ENDOSCOPY  [facility]       [facility] COLONOSCOPY THRU STOMA, DIAGNOSTIC  2007    polyps removed.        Social History     Tobacco Use     Smoking status: Never     Smokeless tobacco: Never   Vaping Use     Vaping status: Not on file   Substance Use Topics     Alcohol use: Yes     Alcohol/week: 0.0 standard drinks of alcohol     Comment: 1/week     Family History   Problem Relation Age of Onset     Diabetes Father      Hypertension Father      Colon Cancer Father      Hypertension Maternal Grandmother      Heart Failure Maternal Grandmother      Diabetes Maternal Uncle      Lung Cancer Maternal Uncle          Current Outpatient Medications   Medication Sig Dispense Refill     fluocinonide (LIDEX) 0.05 % external solution Apply topically 2 times daily To affected areas on the scalp as needed 60 mL 3     ketoconazole (NIZORAL) 2 % external cream Apply topically 2 times daily To affected area on right eyebrow,until clear. Mix with equal parts triam 0.025% 15 g 1     losartan (COZAAR) 100 MG tablet TAKE ONE TABLET BY MOUTH ONCE DAILY 90 tablet 3     triamcinolone (KENALOG) 0.025 % cream Apply topically 2 times daily To affected area on the right eyebrow, until clear. Mix  "with equal parts ketoconazole 15 g 1     No Known Allergies    Breast Cancer Screening:    FHS-7:       4/6/2022     6:54 AM 8/17/2022     8:20 AM 4/25/2023     9:37 AM   Breast CA Risk Assessment (FHS-7)   Did any of your first-degree relatives have breast or ovarian cancer? No No No   Did any of your relatives have bilateral breast cancer? No No No   Did any man in your family have breast cancer?  No    Did any woman in your family have breast and ovarian cancer?  No    Did any woman in your family have breast cancer before age 50 y?  No    Do you have 2 or more relatives with breast and/or ovarian cancer?  No    Do you have 2 or more relatives with breast and/or bowel cancer?  Yes        Mammogram Screening: Recommended mammography every 1-2 years with patient discussion and risk factor consideration  Pertinent mammograms are reviewed under the imaging tab.    History of abnormal Pap smear: NO - age 30-65 PAP every 5 years with negative HPV co-testing recommended      Latest Ref Rng & Units 1/8/2021     1:23 PM 1/8/2021     1:22 PM 11/12/2015     8:48 AM   PAP / HPV   PAP (Historical)   NIL      HPV 16 DNA NEG^Negative Negative    Negative     HPV 18 DNA NEG^Negative Negative    Negative     Other HR HPV NEG^Negative Negative    Negative       Reviewed and updated as needed this visit by clinical staff   Tobacco  Allergies  Meds              Reviewed and updated as needed this visit by Provider    Allergies                  Review of Systems   Constitutional: Negative for chills.   HENT: Negative for congestion.    Respiratory: Negative for cough.    Cardiovascular: Negative for chest pain.   Gastrointestinal: Negative for abdominal pain, constipation and hematochezia.   Genitourinary: Negative for hematuria.   All other systems reviewed and are negative.         OBJECTIVE:   /70   Pulse 78   Temp 98.6  F (37  C) (Temporal)   Resp 16   Ht 1.58 m (5' 2.21\")   Wt 79.4 kg (175 lb)   LMP 05/15/2015 " (Exact Date)   SpO2 97%   BMI 31.80 kg/m    Physical Exam  GENERAL APPEARANCE: healthy, alert and no distress  EYES: Eyes grossly normal to inspection, PERRL and conjunctivae and sclerae normal  HENT: ear canals and TM's normal, nose and mouth without ulcers or lesions, oropharynx clear and oral mucous membranes moist  NECK: no adenopathy, no asymmetry, masses, or scars and thyroid normal to palpation  RESP: lungs clear to auscultation - no rales, rhonchi or wheezes  BREAST: normal without masses, tenderness or nipple discharge and no palpable axillary masses or adenopathy  CV: regular rate and rhythm, normal S1 S2, no S3 or S4, no murmur, click or rub, no peripheral edema and peripheral pulses strong  ABDOMEN: soft, nontender, no hepatosplenomegaly, no masses and bowel sounds normal  MS: no musculoskeletal defects are noted and gait is age appropriate without ataxia  SKIN: no suspicious lesions or rashes  NEURO: Normal strength and tone, sensory exam grossly normal, mentation intact and speech normal  PSYCH: mentation appears normal and affect normal/bright        ASSESSMENT/PLAN:     1. Annual physical exam      2. Hypertension goal BP (blood pressure) < 140/90  Well-controlled.  Resume losartan 100 g daily.  Refill ordered.  Fasting labs ordered today  - losartan (COZAAR) 100 MG tablet; TAKE ONE TABLET BY MOUTH ONCE DAILY  Dispense: 90 tablet; Refill: 3  - Lipid panel reflex to direct LDL Fasting; Future  - Comprehensive metabolic panel; Future  - Lipid panel reflex to direct LDL Fasting  - Comprehensive metabolic panel    3. Screen for colon cancer    - Colonoscopy Screening  Referral; Future    4. High priority for 2019-nCoV vaccine    - COVID-19,PF,PFIZER BOOSTER BIVALENT 12+Yrs        COUNSELING:  Reviewed preventive health counseling, as reflected in patient instructions       Regular exercise       Healthy diet/nutrition       Osteoporosis prevention/bone health      BMI:   Estimated body mass  "index is 31.8 kg/m  as calculated from the following:    Height as of this encounter: 1.58 m (5' 2.21\").    Weight as of this encounter: 79.4 kg (175 lb).   Weight management plan: Discussed healthy diet and exercise guidelines      She reports that she has never smoked. She has never used smokeless tobacco.      Evelin Soto MD  [facility]  "

## 2023-05-10 ENCOUNTER — LAB (OUTPATIENT)
Dept: LAB | Facility: CLINIC | Age: 58
End: 2023-05-10
Payer: COMMERCIAL

## 2023-05-10 DIAGNOSIS — I10 HYPERTENSION GOAL BP (BLOOD PRESSURE) < 140/90: ICD-10-CM

## 2023-05-10 LAB
ALBUMIN SERPL BCG-MCNC: 4.2 G/DL (ref 3.5–5.2)
ALP SERPL-CCNC: 119 U/L (ref 35–104)
ALT SERPL W P-5'-P-CCNC: 19 U/L (ref 10–35)
ANION GAP SERPL CALCULATED.3IONS-SCNC: 12 MMOL/L (ref 7–15)
AST SERPL W P-5'-P-CCNC: 23 U/L (ref 10–35)
BILIRUB SERPL-MCNC: 0.5 MG/DL
BUN SERPL-MCNC: 15.1 MG/DL (ref 6–20)
CALCIUM SERPL-MCNC: 9.2 MG/DL (ref 8.6–10)
CHLORIDE SERPL-SCNC: 105 MMOL/L (ref 98–107)
CREAT SERPL-MCNC: 1.03 MG/DL (ref 0.51–0.95)
DEPRECATED HCO3 PLAS-SCNC: 22 MMOL/L (ref 22–29)
GFR SERPL CREATININE-BSD FRML MDRD: 63 ML/MIN/1.73M2
GLUCOSE SERPL-MCNC: 106 MG/DL (ref 70–99)
POTASSIUM SERPL-SCNC: 4.6 MMOL/L (ref 3.4–5.3)
PROT SERPL-MCNC: 7.2 G/DL (ref 6.4–8.3)
SODIUM SERPL-SCNC: 139 MMOL/L (ref 136–145)

## 2023-05-10 PROCEDURE — 36415 COLL VENOUS BLD VENIPUNCTURE: CPT

## 2023-05-10 PROCEDURE — 80053 COMPREHEN METABOLIC PANEL: CPT

## 2023-07-06 ENCOUNTER — TRANSFERRED RECORDS (OUTPATIENT)
Dept: HEALTH INFORMATION MANAGEMENT | Facility: CLINIC | Age: 58
End: 2023-07-06

## 2023-07-18 ENCOUNTER — PATIENT OUTREACH (OUTPATIENT)
Dept: CARE COORDINATION | Facility: CLINIC | Age: 58
End: 2023-07-18

## 2023-08-17 ENCOUNTER — ANCILLARY ORDERS (OUTPATIENT)
Dept: MAMMOGRAPHY | Facility: CLINIC | Age: 58
End: 2023-08-17

## 2023-08-17 DIAGNOSIS — Z12.31 VISIT FOR SCREENING MAMMOGRAM: Primary | ICD-10-CM

## 2023-10-11 ENCOUNTER — ANCILLARY PROCEDURE (OUTPATIENT)
Dept: MAMMOGRAPHY | Facility: CLINIC | Age: 58
End: 2023-10-11
Payer: COMMERCIAL

## 2023-10-11 DIAGNOSIS — Z12.31 VISIT FOR SCREENING MAMMOGRAM: ICD-10-CM

## 2023-10-11 PROCEDURE — 77063 BREAST TOMOSYNTHESIS BI: CPT | Mod: TC | Performed by: RADIOLOGY

## 2023-10-11 PROCEDURE — 77067 SCR MAMMO BI INCL CAD: CPT | Mod: TC | Performed by: RADIOLOGY

## 2023-10-17 ENCOUNTER — OFFICE VISIT (OUTPATIENT)
Dept: FAMILY MEDICINE | Facility: CLINIC | Age: 58
End: 2023-10-17
Payer: COMMERCIAL

## 2023-10-17 DIAGNOSIS — Z86.006 HISTORY OF MELANOMA IN SITU: Primary | ICD-10-CM

## 2023-10-17 DIAGNOSIS — D22.9 MULTIPLE BENIGN NEVI: ICD-10-CM

## 2023-10-17 DIAGNOSIS — D18.01 CHERRY ANGIOMA: ICD-10-CM

## 2023-10-17 DIAGNOSIS — L21.9 SEBORRHEIC DERMATITIS: ICD-10-CM

## 2023-10-17 DIAGNOSIS — L82.0 SEBORRHEIC KERATOSES, INFLAMED: ICD-10-CM

## 2023-10-17 DIAGNOSIS — L81.4 SOLAR LENTIGO: ICD-10-CM

## 2023-10-17 DIAGNOSIS — L82.1 SEBORRHEIC KERATOSES: ICD-10-CM

## 2023-10-17 DIAGNOSIS — Z12.83 SKIN CANCER SCREENING: ICD-10-CM

## 2023-10-17 PROCEDURE — 99213 OFFICE O/P EST LOW 20 MIN: CPT | Performed by: PHYSICIAN ASSISTANT

## 2023-10-17 NOTE — PATIENT INSTRUCTIONS

## 2023-10-17 NOTE — PROGRESS NOTES
[facility] Dermatology Note  Encounter Date: Oct 17, 2023  Office Visit     Dermatology Problem List:  Last FBSE 4/18/23, recommend every 6 months    0. Lesion to monitor R lower cheek (photo 4/18/2023; stable 10/17/2023)  1. History of melanoma  - Early melanocytic proliferation (treated as MIS), L medial distal thigh s/p WLE 1/21/2019  2. History of dysplastic nevus  - Mild atypia, L leg s/p shave biopsy 1/3/2019  3. Seborrheic dermatitis  - Current tx: fluocinonide 0.05% solution, ketoconazole 2% cream, triamcinolone 0.025% cream  - Previous tx: ketoconazole 2% shampoo, betamethasone 0.05% cream  4. Solar lentigo, L lower leg s/p shave biopsy 4/12/2022  5. 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    ____________________________________________    Assessment & Plan:    # Lesion to monitor (macular sk), R lower cheek. Unchanged from last exam.  - Monitor: Patient to continue monitoring at home and will contact the clinic for any changes.     # Seborrheic dermatitis. Chronic. Improved. Controlled.  - Continue fluocinonide 0.05% solution BID PRN to affected areas on the scalp.  - Continue ketoconazole 2% cream mixed 1:1 triamcinolone 0.025% cream BID PRN to affected areas on the right eyebrow.    # Seborrheic keratosis, inflamed, upper chest x 2. Patient reports intermittent inflammation.   - Monitor: Patient to continue monitoring at home and will contact the clinic for any changes.     # History of melanoma. No evidence of recurrent disease.  # History of dysplastic nevus.  - ABCDEs of melanoma advised.  - Continue photoprotection.  - Continue skin exams twice yearly.  - Advised to monitor for changing, non-healing, bleeding, painful, changing, or otherwise symptomatic lesions.     # Cherry angioma.  # Seborrheic keratosis.  Discussed the natural history and benign nature of this lesion. Reassurance provided that no  additional treatment is necessary.     # Solar lentigines.  # Multiple benign nevi.  - No concerning lesions today  - Monitor for ABCDEs of melanoma   - Continue sun protection - recommend SPF 30 or higher with frequent application.  - Return sooner if noticing changing or symptomatic lesions.    Procedures Performed:   N/A    Follow-up: 6 month(s) in-person, or earlier for new or changing lesions    Staff and Scribe:     Scribe Disclosure:   I, Jazzmine Soliman, am serving as a scribe to document services personally performed by Saskia Oliveros PA-C based on data collection and the provider's statements to me.       Provider Disclosure:   The documentation recorded by the scribe accurately reflects the services I personally performed and the decisions made by me.    All risks, benefits and alternatives were discussed with patient.  Patient is in agreement and understands the assessment and plan.  All questions were answered.  Sun Screen Education was given.   Return to Clinic in 6 months or sooner as needed.   Saskia Oliveros PA-C   [facility]    ____________________________________________    CC: Skin Check    HPI:  Ms. Kristen Bernstein is a(n) 58 year old female who presents today as a return patient for a skin check. Last seen by me 4/18/23 for a FBSE, at which time a NUB on her L lower back was biopsied and path confirmed it to be a compound melanocytic nevus. She also had a possible macular SK on her R lower cheek which she elected to monitor. Lastly, for seborrheic dermatitis on her scalp and eyebrows, which was not well-controlled, she was to start fluocinonide 0.05% solution BID PRN on the scalp and ketoconazole 2% cream mixed 1:1 triamcinolone 0.025% cream BID PRN on the R eyebrow.    Today, she reports scalp dermatitis drastically improved with fluocinonide 0.05% solution, which she continues to use regularly. She also reports improvement with R eyebrow dermatitis with  ketoconazole 2% cream mixed 1:1 triamcinolone 0.025% cream. She does not need refill of either today.    She also reports 2 lesions on her upper chest, which are itchy and dry at times.    Denies cough, fever, chills, night sweats.    Patient is otherwise feeling well, without additional skin concerns.    Labs Reviewed:  Last derm path 4/18/23  A(1). L lower back:  - Compound melanocytic nevus    Physical Exam:  Vitals: LMP 05/15/2015 (Exact Date)   SKIN: Total skin excluding the undergarment areas was performed. The exam included the head/face, neck, both arms, chest, back, abdomen, both legs, digits and/or nails.   - Stable 1.1 cm brown patch on the R lower cheek.  - There are dome shaped bright red papules on the trunk and extremities.   - Multiple regular brown pigmented macules and papules are identified on the trunk and extremities.   - Scattered brown macules on sun exposed areas.  - There are waxy stuck on tan to brown papules on the trunk and extremities.  - There is no erythema, telangectasias, nodularity, or pigmentation on the sites of prior melanoma and DN.   - No lymphadenopathy on the cervical, axillary, groin or popliteal nodes.   - There are 2 tan to brown waxy stuck on papules with surrounding erythema on the upper chest.   - There is macular erythema of the scalp and R eyebrow with mild flaky white scale.  - No other lesions of concern on areas examined.     Medications:  Current Outpatient Medications   Medication    fluocinonide (LIDEX) 0.05 % external solution    ketoconazole (NIZORAL) 2 % external cream    losartan (COZAAR) 100 MG tablet    triamcinolone (KENALOG) 0.025 % cream     No current facility-administered medications for this visit.      Past Medical History:   Patient Active Problem List   Diagnosis    Hypertension goal BP (blood pressure) < 140/90    Family history of colon cancer    GERD (gastroesophageal reflux disease)    CARDIOVASCULAR SCREENING; LDL GOAL LESS THAN 130     History of melanoma in situ - atypical melanocytic proliferation best treated as an early evolving melanoma in situ     Past Medical History:   Diagnosis Date    GERD     Rash and other nonspecific skin eruption (aka PRURITIS)     Unspecified essential hypertension         CC No referring provider defined for this encounter. on close of this encounter.

## 2023-10-17 NOTE — LETTER
10/17/2023         RE: Kristen Bernstein  [address]  [place] [address]        Dear Colleague,    Thank you for referring your patient, Kristen Bernstein, to the [facility]. Please see a copy of my visit note below.    [facility] Dermatology Note  Encounter Date: Oct 17, 2023  Office Visit     Dermatology Problem List:  Last FBSE 4/18/23, recommend every 6 months    0. Lesion to monitor R lower cheek (photo 4/18/2023; stable 10/17/2023)  1. History of melanoma  - Early melanocytic proliferation (treated as MIS), L medial distal thigh s/p WLE 1/21/2019  2. History of dysplastic nevus  - Mild atypia, L leg s/p shave biopsy 1/3/2019  3. Seborrheic dermatitis  - Current tx: fluocinonide 0.05% solution, ketoconazole 2% cream, triamcinolone 0.025% cream  - Previous tx: ketoconazole 2% shampoo, betamethasone 0.05% cream  4. Solar lentigo, L lower leg s/p shave biopsy 4/12/2022  5. 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    ____________________________________________    Assessment & Plan:    # Lesion to monitor (macular sk), R lower cheek. Unchanged from last exam.  - Monitor: Patient to continue monitoring at home and will contact the clinic for any changes.     # Seborrheic dermatitis. Chronic. Improved. Controlled.  - Continue fluocinonide 0.05% solution BID PRN to affected areas on the scalp.  - Continue ketoconazole 2% cream mixed 1:1 triamcinolone 0.025% cream BID PRN to affected areas on the right eyebrow.    # Seborrheic keratosis, inflamed, upper chest x 2. Patient reports intermittent inflammation.   - Monitor: Patient to continue monitoring at home and will contact the clinic for any changes.     # History of melanoma. No evidence of recurrent disease.  # History of dysplastic nevus.  - ABCDEs of melanoma advised.  - Continue photoprotection.  - Continue skin exams twice  yearly.  - Advised to monitor for changing, non-healing, bleeding, painful, changing, or otherwise symptomatic lesions.     # Cherry angioma.  # Seborrheic keratosis.  Discussed the natural history and benign nature of this lesion. Reassurance provided that no additional treatment is necessary.     # Solar lentigines.  # Multiple benign nevi.  - No concerning lesions today  - Monitor for ABCDEs of melanoma   - Continue sun protection - recommend SPF 30 or higher with frequent application.  - Return sooner if noticing changing or symptomatic lesions.    Procedures Performed:   N/A    Follow-up: 6 month(s) in-person, or earlier for new or changing lesions    Staff and Scribe:     Scribe Disclosure:   I, Jazzmine Soliman, am serving as a scribe to document services personally performed by Saskia Oliveros PA-C based on data collection and the provider's statements to me.       Provider Disclosure:   The documentation recorded by the scribe accurately reflects the services I personally performed and the decisions made by me.    All risks, benefits and alternatives were discussed with patient.  Patient is in agreement and understands the assessment and plan.  All questions were answered.  Sun Screen Education was given.   Return to Clinic in 6 months or sooner as needed.   Saskia Oliveros PA-C   [facility]    ____________________________________________    CC: Skin Check    HPI:  Ms. Kristen Bernstein is a(n) 58 year old female who presents today as a return patient for a skin check. Last seen by me 4/18/23 for a FBSE, at which time a NUB on her L lower back was biopsied and path confirmed it to be a compound melanocytic nevus. She also had a possible macular SK on her R lower cheek which she elected to monitor. Lastly, for seborrheic dermatitis on her scalp and eyebrows, which was not well-controlled, she was to start fluocinonide 0.05% solution BID PRN on the scalp and ketoconazole 2%  cream mixed 1:1 triamcinolone 0.025% cream BID PRN on the R eyebrow.    Today, she reports scalp dermatitis drastically improved with fluocinonide 0.05% solution, which she continues to use regularly. She also reports improvement with R eyebrow dermatitis with ketoconazole 2% cream mixed 1:1 triamcinolone 0.025% cream. She does not need refill of either today.    She also reports 2 lesions on her upper chest, which are itchy and dry at times.    Denies cough, fever, chills, night sweats.    Patient is otherwise feeling well, without additional skin concerns.    Labs Reviewed:  Last derm path 4/18/23  A(1). L lower back:  - Compound melanocytic nevus    Physical Exam:  Vitals: LMP 05/15/2015 (Exact Date)   SKIN: Total skin excluding the undergarment areas was performed. The exam included the head/face, neck, both arms, chest, back, abdomen, both legs, digits and/or nails.   - Stable 1.1 cm brown patch on the R lower cheek.  - There are dome shaped bright red papules on the trunk and extremities.   - Multiple regular brown pigmented macules and papules are identified on the trunk and extremities.   - Scattered brown macules on sun exposed areas.  - There are waxy stuck on tan to brown papules on the trunk and extremities.  - There is no erythema, telangectasias, nodularity, or pigmentation on the sites of prior melanoma and DN.   - No lymphadenopathy on the cervical, axillary, groin or popliteal nodes.   - There are 2 tan to brown waxy stuck on papules with surrounding erythema on the upper chest.   - There is macular erythema of the scalp and R eyebrow with mild flaky white scale.  - No other lesions of concern on areas examined.     Medications:  Current Outpatient Medications   Medication     fluocinonide (LIDEX) 0.05 % external solution     ketoconazole (NIZORAL) 2 % external cream     losartan (COZAAR) 100 MG tablet     triamcinolone (KENALOG) 0.025 % cream     No current facility-administered medications for  this visit.      Past Medical History:   Patient Active Problem List   Diagnosis     Hypertension goal BP (blood pressure) < 140/90     Family history of colon cancer     GERD (gastroesophageal reflux disease)     CARDIOVASCULAR SCREENING; LDL GOAL LESS THAN 130     History of melanoma in situ - atypical melanocytic proliferation best treated as an early evolving melanoma in situ     Past Medical History:   Diagnosis Date     GERD      Rash and other nonspecific skin eruption (aka PRURITIS)      Unspecified essential hypertension         CC No referring provider defined for this encounter. on close of this encounter.      Again, thank you for allowing me to participate in the care of your patient.        Sincerely,        Saskia Oliveros PA-C

## 2024-04-30 ENCOUNTER — OFFICE VISIT (OUTPATIENT)
Dept: FAMILY MEDICINE | Facility: CLINIC | Age: 59
End: 2024-04-30
Payer: COMMERCIAL

## 2024-04-30 DIAGNOSIS — L82.1 SEBORRHEIC KERATOSES: ICD-10-CM

## 2024-04-30 DIAGNOSIS — L30.4 INTERTRIGO: ICD-10-CM

## 2024-04-30 DIAGNOSIS — D22.9 MULTIPLE BENIGN NEVI: ICD-10-CM

## 2024-04-30 DIAGNOSIS — D49.2 NEOPLASM OF UNSPECIFIED BEHAVIOR OF BONE, SOFT TISSUE, AND SKIN: ICD-10-CM

## 2024-04-30 DIAGNOSIS — L50.3 DERMATOGRAPHISM: ICD-10-CM

## 2024-04-30 DIAGNOSIS — L81.4 SOLAR LENTIGO: ICD-10-CM

## 2024-04-30 DIAGNOSIS — D18.01 CHERRY ANGIOMA: ICD-10-CM

## 2024-04-30 DIAGNOSIS — Z12.83 SKIN CANCER SCREENING: Primary | ICD-10-CM

## 2024-04-30 DIAGNOSIS — L21.9 SEBORRHEIC DERMATITIS: ICD-10-CM

## 2024-04-30 DIAGNOSIS — Z86.006 HISTORY OF MELANOMA IN SITU: ICD-10-CM

## 2024-04-30 PROCEDURE — 88341 IMHCHEM/IMCYTCHM EA ADD ANTB: CPT | Performed by: DERMATOLOGY

## 2024-04-30 PROCEDURE — 99214 OFFICE O/P EST MOD 30 MIN: CPT | Mod: 25 | Performed by: PHYSICIAN ASSISTANT

## 2024-04-30 PROCEDURE — 11102 TANGNTL BX SKIN SINGLE LES: CPT | Performed by: PHYSICIAN ASSISTANT

## 2024-04-30 PROCEDURE — 88305 TISSUE EXAM BY PATHOLOGIST: CPT | Performed by: DERMATOLOGY

## 2024-04-30 PROCEDURE — 88342 IMHCHEM/IMCYTCHM 1ST ANTB: CPT | Performed by: DERMATOLOGY

## 2024-04-30 RX ORDER — KETOCONAZOLE 20 MG/G
CREAM TOPICAL 2 TIMES DAILY
Qty: 15 G | Refills: 1 | Status: SHIPPED | OUTPATIENT
Start: 2024-04-30

## 2024-04-30 RX ORDER — TRIAMCINOLONE ACETONIDE 0.25 MG/G
CREAM TOPICAL 2 TIMES DAILY
Qty: 15 G | Refills: 1 | Status: SHIPPED | OUTPATIENT
Start: 2024-04-30

## 2024-04-30 RX ORDER — FLUOCINONIDE TOPICAL SOLUTION USP, 0.05% 0.5 MG/ML
SOLUTION TOPICAL 2 TIMES DAILY
Qty: 60 ML | Refills: 3 | Status: SHIPPED | OUTPATIENT
Start: 2024-04-30

## 2024-04-30 NOTE — PROGRESS NOTES
[facility] Dermatology Note  Encounter Date: Apr 30, 2024  Office Visit     Dermatology Problem List:  Last FBSE 04/30/2024, recommend every 6 months    0. Lesion to monitor R lower cheek (photo 4/18/2023; stable 10/17/2023)  - s/p partial shave bx 04/30/2024  1. History of melanoma  - Early melanocytic proliferation (treated as MIS), L medial distal thigh s/p WLE 1/21/2019  2. History of dysplastic nevus  - Mild atypia, L leg s/p shave biopsy 1/3/2019  3. Seborrheic dermatitis  - Current tx: fluocinonide 0.05% solution, ketoconazole 2% cream, triamcinolone 0.025% cream  - Previous tx: ketoconazole 2% shampoo, betamethasone 0.05% cream  4. Solar lentigo, L lower leg s/p shave biopsy 4/12/2022  5. 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    ____________________________________________    Assessment & Plan:    # Neoplasm of Unspecified behavior on R lower cheek. Differential diagnosis includes macular SK vs melanoma in situ.  - partial shave biopsy performed today, see note below    # Seborrheic dermatitis. Chronic. Improved. Controlled.  - Continue fluocinonide 0.05% solution BID PRN to affected areas on the scalp.  - Continue ketoconazole 2% cream mixed 1:1 triamcinolone 0.025% cream BID PRN on the face.     # History of melanoma. No evidence of recurrent disease.  # History of dysplastic nevus.  - ABCDEs of melanoma advised.  - Continue photoprotection.  - Continue skin exams twice yearly.  - Advised to monitor for changing, non-healing, bleeding, painful, changing, or otherwise symptomatic lesions.     # Skin cancer screening with multiple benign nevi, solar lentigines  - ABCDEs: Counseled ABCDEs of melanoma: Asymmetry, Border (irregularity), Color (not uniform, changes in color), Diameter (greater than 6 mm which is about the size of a pencil eraser), and Evolving (any changes in preexisting moles).  - Sun protection:  Counseled SPF30+ sunscreen, UPF clothing, sun avoidance, tanning bed avoidance.    # Cherry angiomas and seborrheic keratoses,  Benign, reassurance given.     # Dermatographism  - recommending OTC Antihistamines daily    # Intertrigo-  Okay to use combination of ketoconazole and triamcinolone as needed and affected areas when options occur    Procedures Performed:   - Partial Shave biopsy: After discussion of benefits and risks including but not limited to bleeding, infection, scar, incomplete removal, recurrence, and non-diagnostic biopsy, written consent and photographs were obtained. The area was cleaned with isopropyl alcohol. < 1mL of 1% lidocaine with epinephrine was injected to obtain adequate anesthesia. A shave biopsy was performed. Hemostasis was achieved with aluminium chloride. Vaseline and a sterile dressing were applied. The patient tolerated the procedure and no complications were noted. The patient was provided with verbal and written post care instructions.       Follow-up: 6 month(s) in-person, or earlier for new or changing lesions    Staff and Scribe:   Scribe Disclosure:   By signing my name below, I, Freda Yarbrough, attest that this documentation has been prepared under the direction and in the presence of Saskia Oliveros PA-C.  - Electronically Signed: Freda Yarbrough 04/30/24       Provider Disclosure:  I agree with above History, Review of Systems, Physical exam and Plan.  I have reviewed the content of the documentation and have edited it as needed. I have personally performed the services documented here and the documentation accurately represents those services and the decisions I have made.      Electronically signed by:  All risk, benefits and alternatives were discussed with patient.  Patient is in agreement and understands the assessment and plan.  All questions were answered.  Sun Screen Education was given.   Return to Clinic in 6 months or sooner as needed.   Saskia Oliveros  ELIZABEHT Kumar has another 6-month check    ____________________________________________    CC: Skin Check    HPI:  Ms. Kristen Bernstein is a(n) 58 year old female who presents today as a return patient for a skin check. Last seen by me 10/17/2023.    Patient reports itching on the back of her neck for the past 2 days. She reports she sweats at night and believes it could be a triggering factor. Patient reports an area of concern that is bothersome and itchy. She uses sensitive skin dove body soap. She reports the past 1.5 weeks the itching on the back of her neck occurred, but usually it moves to different areas. She recently got her hair colored. Patient is still diligent about sun protection.    Patient is otherwise feeling well, without additional skin concerns.    Labs Reviewed:  N/A    Physical exam:  Vitals: LMP 05/15/2015 (Exact Date)   GEN: This is a well developed, well-nourished female in no acute distress, in a pleasant mood.    SKIN: Full skin, which includes the head/face, both arms, chest, back, abdomen,both legs, genitalia and/or groin buttocks, digits and/or nails, was examined.  -No scaly plaques to the face.  - R lower cheek 1.5 cm brown asymmetric patch r/o macular sk vs mis  - There are dome shaped bright red papules on the head/neck, trunk, extremities.   - Multiple regular brown pigmented macules and papules are identified on the head/neck, trunk, extremities.   - Scattered brown macules on sun exposed areas.  - There are waxy stuck on tan to brown papules on the head/neck, trunk, extremities.  - pink linear wheels on L neck  - There is no lymphadenopathy on palpation of cervical, post auricular, occipital, axillary, inguinal, and popliteal nodes.  - positive dermatographism  - No other lesions of concern on areas examined.           Medications:  Current Outpatient Medications   Medication Sig Dispense Refill    fluocinonide (LIDEX) 0.05 % external solution Apply topically 2 times daily To  affected areas on the scalp as needed 60 mL 3    ketoconazole (NIZORAL) 2 % external cream Apply topically 2 times daily To affected area on right eyebrow,until clear. Mix with equal parts triam 0.025% 15 g 1    losartan (COZAAR) 100 MG tablet TAKE ONE TABLET BY MOUTH ONCE DAILY 90 tablet 3    triamcinolone (KENALOG) 0.025 % cream Apply topically 2 times daily To affected area on the right eyebrow, until clear. Mix with equal parts ketoconazole 15 g 1     No current facility-administered medications for this visit.      Past Medical History:   Patient Active Problem List   Diagnosis    Hypertension goal BP (blood pressure) < 140/90    Family history of colon cancer    GERD (gastroesophageal reflux disease)    CARDIOVASCULAR SCREENING; LDL GOAL LESS THAN 130    History of melanoma in situ - atypical melanocytic proliferation best treated as an early evolving melanoma in situ     Past Medical History:   Diagnosis Date    GERD     Rash and other nonspecific skin eruption (aka PRURITIS)     Unspecified essential hypertension         CC No referring provider defined for this encounter. on close of this encounter.

## 2024-04-30 NOTE — LETTER
4/30/2024         RE: Kristen Bernstein  [address] [facility] [address]        Dear Colleague,    Thank you for referring your patient, Kristen Bernstein, to the [facility] [place]. Please see a copy of my visit note below.    [facility] Dermatology Note  Encounter Date: Apr 30, 2024  Office Visit     Dermatology Problem List:  Last FBSE 04/30/2024, recommend every 6 months    0. Lesion to monitor R lower cheek (photo 4/18/2023; stable 10/17/2023)  - s/p partial shave bx 04/30/2024  1. History of melanoma  - Early melanocytic proliferation (treated as MIS), L medial distal thigh s/p WLE 1/21/2019  2. History of dysplastic nevus  - Mild atypia, L leg s/p shave biopsy 1/3/2019  3. Seborrheic dermatitis  - Current tx: fluocinonide 0.05% solution, ketoconazole 2% cream, triamcinolone 0.025% cream  - Previous tx: ketoconazole 2% shampoo, betamethasone 0.05% cream  4. Solar lentigo, L lower leg s/p shave biopsy 4/12/2022  5. 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    ____________________________________________    Assessment & Plan:    # Neoplasm of Unspecified behavior on R lower cheek. Differential diagnosis includes macular SK vs melanoma in situ.  - partial shave biopsy performed today, see note below    # Seborrheic dermatitis. Chronic. Improved. Controlled.  - Continue fluocinonide 0.05% solution BID PRN to affected areas on the scalp.  - Continue ketoconazole 2% cream mixed 1:1 triamcinolone 0.025% cream BID PRN on the face.     # History of melanoma. No evidence of recurrent disease.  # History of dysplastic nevus.  - ABCDEs of melanoma advised.  - Continue photoprotection.  - Continue skin exams twice yearly.  - Advised to monitor for changing, non-healing, bleeding, painful, changing, or otherwise symptomatic lesions.     # Skin cancer screening with multiple benign nevi, solar  lentigines  - ABCDEs: Counseled ABCDEs of melanoma: Asymmetry, Border (irregularity), Color (not uniform, changes in color), Diameter (greater than 6 mm which is about the size of a pencil eraser), and Evolving (any changes in preexisting moles).  - Sun protection: Counseled SPF30+ sunscreen, UPF clothing, sun avoidance, tanning bed avoidance.    # Cherry angiomas and seborrheic keratoses,  Benign, reassurance given.     # Dermatographism  - recommending OTC Antihistamines daily    # Intertrigo-  Okay to use combination of ketoconazole and triamcinolone as needed and affected areas when options occur    Procedures Performed:   - Partial Shave biopsy: After discussion of benefits and risks including but not limited to bleeding, infection, scar, incomplete removal, recurrence, and non-diagnostic biopsy, written consent and photographs were obtained. The area was cleaned with isopropyl alcohol. < 1mL of 1% lidocaine with epinephrine was injected to obtain adequate anesthesia. A shave biopsy was performed. Hemostasis was achieved with aluminium chloride. Vaseline and a sterile dressing were applied. The patient tolerated the procedure and no complications were noted. The patient was provided with verbal and written post care instructions.       Follow-up: 6 month(s) in-person, or earlier for new or changing lesions    Staff and Scribe:   Scribe Disclosure:   By signing my name below, I, Freda Yarbrough, attest that this documentation has been prepared under the direction and in the presence of Saskia Oliveros PA-C.  - Electronically Signed: Freda Yarbrough 04/30/24       Provider Disclosure:  I agree with above History, Review of Systems, Physical exam and Plan.  I have reviewed the content of the documentation and have edited it as needed. I have personally performed the services documented here and the documentation accurately represents those services and the decisions I have made.      Electronically signed  by:  All risk, benefits and alternatives were discussed with patient.  Patient is in agreement and understands the assessment and plan.  All questions were answered.  Sun Screen Education was given.   Return to Clinic in 6 months or sooner as needed.   Saskia Kumar has another 6-month check    ____________________________________________    CC: Skin Check    HPI:  Ms. Kristen Bernstein is a(n) 58 year old female who presents today as a return patient for a skin check. Last seen by me 10/17/2023.    Patient reports itching on the back of her neck for the past 2 days. She reports she sweats at night and believes it could be a triggering factor. Patient reports an area of concern that is bothersome and itchy. She uses sensitive skin dove body soap. She reports the past 1.5 weeks the itching on the back of her neck occurred, but usually it moves to different areas. She recently got her hair colored. Patient is still diligent about sun protection.    Patient is otherwise feeling well, without additional skin concerns.    Labs Reviewed:  N/A    Physical exam:  Vitals: LMP 05/15/2015 (Exact Date)   GEN: This is a well developed, well-nourished female in no acute distress, in a pleasant mood.    SKIN: Full skin, which includes the head/face, both arms, chest, back, abdomen,both legs, genitalia and/or groin buttocks, digits and/or nails, was examined.  -No scaly plaques to the face.  - R lower cheek 1.5 cm brown asymmetric patch r/o macular sk vs mis  - There are dome shaped bright red papules on the head/neck, trunk, extremities.   - Multiple regular brown pigmented macules and papules are identified on the head/neck, trunk, extremities.   - Scattered brown macules on sun exposed areas.  - There are waxy stuck on tan to brown papules on the head/neck, trunk, extremities.  - pink linear wheels on L neck  - There is no lymphadenopathy on palpation of cervical, post auricular, occipital, axillary, inguinal,  and popliteal nodes.  - positive dermatographism  - No other lesions of concern on areas examined.           Medications:  Current Outpatient Medications   Medication Sig Dispense Refill     fluocinonide (LIDEX) 0.05 % external solution Apply topically 2 times daily To affected areas on the scalp as needed 60 mL 3     ketoconazole (NIZORAL) 2 % external cream Apply topically 2 times daily To affected area on right eyebrow,until clear. Mix with equal parts triam 0.025% 15 g 1     losartan (COZAAR) 100 MG tablet TAKE ONE TABLET BY MOUTH ONCE DAILY 90 tablet 3     triamcinolone (KENALOG) 0.025 % cream Apply topically 2 times daily To affected area on the right eyebrow, until clear. Mix with equal parts ketoconazole 15 g 1     No current facility-administered medications for this visit.      Past Medical History:   Patient Active Problem List   Diagnosis     Hypertension goal BP (blood pressure) < 140/90     Family history of colon cancer     GERD (gastroesophageal reflux disease)     CARDIOVASCULAR SCREENING; LDL GOAL LESS THAN 130     History of melanoma in situ - atypical melanocytic proliferation best treated as an early evolving melanoma in situ     Past Medical History:   Diagnosis Date     GERD      Rash and other nonspecific skin eruption (aka PRURITIS)      Unspecified essential hypertension         CC No referring provider defined for this encounter. on close of this encounter.      Again, thank you for allowing me to participate in the care of your patient.        Sincerely,        Saskia Oliveros PA-C

## 2024-04-30 NOTE — PATIENT INSTRUCTIONS
Patient Education       Proper skin care from [facility]:    -Eliminate harsh soaps as they strip the natural oils from the skin, often resulting in dry itchy skin ( i.e. Dial, Zest, Turkish Spring)  -Use mild soaps such as Cetaphil or Dove Sensitive Skin in the shower. You do not need to use soap on arms, legs, and trunk every time you shower unless visibly soiled.   -Avoid hot or cold showers.  -After showering, lightly dry off and apply moisturizing within 2-3 minutes. This will help trap moisture in the skin.   -Aggressive use of a moisturizer at least 1-2 times a day to the entire body (including -Vanicream, Cetaphil, Aquaphor or Cerave) and moisturize hands after every washing.  -We recommend using moisturizers that come in a tub that needs to be scooped out, not a pump. This has more of an oil base. It will hold moisture in your skin much better than a water base moisturizer. The above recommended are non-pore clogging.      Wear a sunscreen with at least SPF 30 on your face, ears, neck and V of the chest daily. Wear sunscreen on other areas of the body if those areas are exposed to the sun throughout the day. Sunscreens can contain physical and/or chemical blockers. Physical blockers are less likely to clog pores, these include zinc oxide and titanium dioxide. Reapply every two hour and after swimming.     Sunscreen examples: https://www.ewg.org/sunscreen/    UV radiation  UVA radiation remains constant throughout the day and throughout the year. It is a longer wavelength than UVB and therefore penetrates deeper into the skin leading to immediate and delayed tanning, photoaging, and skin cancer. 70-80% of UVA and UVB radiation occurs between the hours of 10am-2pm.  UVB radiation  UVB radiation causes the most harmful effects and is more significant during the summer months. However, snow and ice can reflect UVB radiation leading to skin damage during the winter months as well. UVB radiation is  responsible for tanning, burning, inflammation, delayed erythema (pinkness), pigmentation (brown spots), and skin cancer.     I recommend self monthly full body exams and yearly full body exams with a dermatology provider. If you develop a new or changing lesion please follow up for examination. Most skin cancers are pink and scaly or pink and pearly. However, we do see blue/brown/black skin cancers.  Consider the ABCDEs of melanoma when giving yourself your monthly full body exam ( don't forget the groin, buttocks, feet, toes, etc). A-asymmetry, B-borders, C-color, D-diameter, E-elevation or evolving. If you see any of these changes please follow up in clinic. If you cannot see your back I recommend purchasing a hand held mirror to use with a larger wall mirror.       Checking for Skin Cancer  You can find cancer early by checking your skin each month. There are 3 kinds of skin cancer. They are melanoma, basal cell carcinoma, and squamous cell carcinoma. Doing monthly skin checks is the best way to find new marks or skin changes. Follow the instructions below for checking your skin.   The ABCDEs of checking moles for melanoma   Check your moles or growths for signs of melanoma using ABCDE:   Asymmetry: the sides of the mole or growth don t match  Border: the edges are ragged, notched, or blurred  Color: the color within the mole or growth varies  Diameter: the mole or growth is larger than 6 mm (size of a pencil eraser)  Evolving: the size, shape, or color of the mole or growth is changing (evolving is not shown in the images below)    Checking for other types of skin cancer  Basal cell carcinoma or squamous cell carcinoma have symptoms such as:     A spot or mole that looks different from all other marks on your skin  Changes in how an area feels, such as itching, tenderness, or pain  Changes in the skin's surface, such as oozing, bleeding, or scaliness  A sore that does not heal  New swelling or redness beyond  the border of a mole    Who s at risk?  Anyone can get skin cancer. But you are at greater risk if you have:   Fair skin, light-colored hair, or light-colored eyes  Many moles or abnormal moles on your skin  A history of sunburns from sunlight or tanning beds  A family history of skin cancer  A history of exposure to radiation or chemicals  A weakened immune system  If you have had skin cancer in the past, you are at risk for recurring skin cancer.   How to check your skin  Do your monthly skin checkups in front of a full-length mirror. Check all parts of your body, including your:   Head (ears, face, neck, and scalp)  Torso (front, back, and sides)  Arms (tops, undersides, upper, and lower armpits)  Hands (palms, backs, and fingers, including under the nails)  Buttocks and genitals  Legs (front, back, and sides)  Feet (tops, soles, toes, including under the nails, and between toes)  If you have a lot of moles, take digital photos of them each month. Make sure to take photos both up close and from a distance. These can help you see if any moles change over time.   Most skin changes are not cancer. But if you see any changes in your skin, call your doctor right away. Only he or she can diagnose a problem. If you have skin cancer, seeing your doctor can be the first step toward getting the treatment that could save your life.   [employer] last reviewed this educational content on 4/1/2019 2000-2020 [employer]. [address] [place] [address], [place] [address]. All rights reserved. This information is not intended as a substitute for professional medical care. Always follow your healthcare professional's instructions.       When should I call my doctor?  If you are worsening or not improving, please, contact us or seek urgent care as noted below.     Who should I call with questions (adults)?  [facility] (adult and pediatric): [phone]  [facility]  [place] (adult): [phone]  [facility] ([place], [place], [place] and [place]) [phone]  For urgent needs outside of business hours call the [facility] at [phone] and ask for the dermatology resident on call to be paged  If this is a medical emergency and you are unable to reach an ER, Call 911      If you need a prescription refill, please contact your pharmacy. Refills are approved or denied by our Physicians during normal business hours, Monday through Fridays  Per office policy, refills will not be granted if you have not been seen within the past year (or sooner depending on your child's condition)    Wound Care After a Biopsy    What is a skin biopsy?  A skin biopsy allows the doctor to examine a very small piece of tissue under the microscope to determine the diagnosis and the best treatment for the skin condition. A local anesthetic (numbing medicine) is injected with a very small needle into the skin area to be tested. A small piece of skin is taken from the area. Sometimes a suture (stitch) is used.     What are the risks of a skin biopsy?  I will experience scar, bleeding, swelling, pain, crusting and redness. I may experience incomplete removal or recurrence. Risks of this procedure are excessive bleeding, bruising, infection, nerve damage, numbness, thick (hypertrophic or keloidal) scar and non-diagnostic biopsy.    How should I care for my wound for the first 24 hours?  Keep the wound dry and covered for 24 hours  If it bleeds, hold direct pressure on the area for 15 minutes. If bleeding does not stop, call us or go to the emergency room  Avoid strenuous exercise the first 1-2 days or as your doctor instructs you    How should I care for the wound after 24 hours?  After 24 hours, remove the bandage  You may bathe or shower as normal  If you had a scalp biopsy, you can shampoo as usual and can use shower water to clean the biopsy site daily  Clean  the wound once a day with gentle soap and water  Do not scrub, be gentle  Apply white petroleum/Vaseline after cleaning the wound with a cotton swab or a clean finger, and keep the site covered with a Bandaid /bandage. Bandages are not necessary with a scalp biopsy  If you are unable to cover the site with a Bandaid /bandage, re-apply ointment 2-3 times a day to keep the site moist. Moisture will help with healing  Avoid strenuous activity for first 1-2 days  Avoid lakes, rivers, pools, and oceans until the stitches are removed or the site is healed    How do I clean my wound?  Wash hands thoroughly with soap or use hand  before all wound care  Clean the wound with gentle soap and water  Apply white petroleum/Vaseline  to wound after it is clean  Replace the Bandaid /bandage to keep the wound covered for the first few days or as instructed by your doctor  If you had a scalp biopsy, warm shower water to the area on a daily basis should suffice    What should I use to clean my wound?   Cotton-tipped applicators (Qtips )  White petroleum jelly (Vaseline ). Use a clean new container and use Q-tips to apply.  Bandaids  as needed  Gentle soap     How should I care for my wound long term?  Do not get your wound dirty  Keep up with wound care for one week or until the area is healed.  A small scab will form and fall off by itself when the area is completely healed. The area will be red and will become pink in color as it heals. Sun protection is very important for how your scar will turn out. Sunscreen with an SPF 30 or greater is recommended once the area is healed.  You should have some soreness but it should be mild and slowly go away over several days. Talk to your doctor about using tylenol for pain,    When should I call my doctor?  If you have increased:   Pain or swelling  Pus or drainage (clear or slightly yellow drainage is ok)  Temperature over 100F  Spreading redness or warmth around wound    When will  I hear about my results?  The biopsy results can take 2 weeks to come back.  Your results will automatically release to [facility] before your provider has even reviewed them.  The clinic will call you with the results, send you a [facility] message, or have you schedule a follow-up clinic or phone time to discuss the results.  Contact our clinics if you do not hear from us in 2 weeks.    Who should I call with questions?  [facility]: [phone]  [facility]: [phone]  For urgent needs outside of business hours call the [facility] at [phone] and ask for the dermatology resident on call

## 2024-05-02 LAB
PATH REPORT.COMMENTS IMP SPEC: ABNORMAL
PATH REPORT.COMMENTS IMP SPEC: YES
PATH REPORT.FINAL DX SPEC: ABNORMAL
PATH REPORT.GROSS SPEC: ABNORMAL
PATH REPORT.MICROSCOPIC SPEC OTHER STN: ABNORMAL
PATH REPORT.RELEVANT HX SPEC: ABNORMAL

## 2024-05-03 ENCOUNTER — TELEPHONE (OUTPATIENT)
Dept: FAMILY MEDICINE | Facility: CLINIC | Age: 59
End: 2024-05-03
Payer: COMMERCIAL

## 2024-05-03 DIAGNOSIS — D03.39 MELANOMA IN SITU OF CHEEK (H): Primary | ICD-10-CM

## 2024-05-03 NOTE — TELEPHONE ENCOUNTER
Patient Contact    Attempt # 1    Was call answered?  No.  Left message on voicemail with information to call nurse back at [phone].     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  [phone]

## 2024-05-03 NOTE — LETTER
[address]  [address]  [phone]        5/3/2024       Kristen Bernstein  [address] [place] [id number]      Dear Kristen:    You are scheduled for Mohs Surgery on: Thursday May 30th 8 am.    Please check in at [facility], [address].     You don't need to arrive more than 5-10 minutes prior to your appointment time.     Be sure to eat a good breakfast and bathe and wash your hair prior to surgery.     If you are taking any anti-coagulants that are prescribed by your Doctor (such as Coumadin/Warfarin, Plavix, Aspirin, Ibuprofen), please continue taking them.     However, if you are taking anti-coagulants over the counter without a Doctor's order for a medical condition, please discontinue them 10 days prior to surgery.     Please wear loose comfortable clothing as it could possibly be 4-6 hours until your surgery is completed depending upon how many layers of tissue need to be removed.      Thank you,    Neil Farah MD

## 2024-05-03 NOTE — TELEPHONE ENCOUNTER
Pt called back and went over Saskia's message below about biopsy results.  Explained and answered all questions about mohs procedure.  Scheduled with Dr. Farah on Thursday May 30th at 8 am at .    Mohs letter and information sent via my chart and mailed home.    Madeline JEWELL RN  [facility]  [phone]

## 2024-05-03 NOTE — TELEPHONE ENCOUNTER
"----- Message from Saskia Oliveros PA-C sent at 5/3/2024  6:28 AM CDT -----  Please call and discuss this with Salima and inquire where she would like to be seen so you can send a referral for urgent 1-2 weeks (melanoma):    The biopsy on the right lower cheek is a superficial melanoma skin cancer called melanoma in-situ. The type is a lentigo maligna. Basically this was likely a \"sun freckle/lentigo\" for many years and then started to change. This is slow growing and just found in the found in the top layer of the skin. It has potential to keep growing, so it needs further removal with Moh's is recommended to ensure all of the cancerous cells are removed. I would like you to see one of our dermatology surgeons at the [place] (Dr Wilkes) or [facility] (Dr Martinez) who perform a lot of surgeries on the face with skin sparing technique.  The scheduling staff will call you for an appointment with one of our surgeons. We should continue to monitor you closely and see you again 6 months for another skin check.       Let me know if she has any questions or concerns.  Best.  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

## 2024-05-28 NOTE — PROGRESS NOTES
Surgical Office Location:  [facility]  [address] [place] [address]

## 2024-05-30 ENCOUNTER — OFFICE VISIT (OUTPATIENT)
Dept: DERMATOLOGY | Facility: CLINIC | Age: 59
End: 2024-05-30
Payer: COMMERCIAL

## 2024-05-30 DIAGNOSIS — D23.9 DERMAL NEVUS: ICD-10-CM

## 2024-05-30 DIAGNOSIS — D03.39 MELANOMA IN SITU OF CHEEK (H): Primary | ICD-10-CM

## 2024-05-30 DIAGNOSIS — D18.01 ANGIOMA OF SKIN: ICD-10-CM

## 2024-05-30 DIAGNOSIS — Z86.006 HISTORY OF MELANOMA IN SITU: ICD-10-CM

## 2024-05-30 DIAGNOSIS — L81.4 LENTIGO: ICD-10-CM

## 2024-05-30 DIAGNOSIS — L82.1 SEBORRHEIC KERATOSES: ICD-10-CM

## 2024-05-30 PROCEDURE — 99213 OFFICE O/P EST LOW 20 MIN: CPT | Mod: 25 | Performed by: DERMATOLOGY

## 2024-05-30 PROCEDURE — 88342 IMHCHEM/IMCYTCHM 1ST ANTB: CPT | Mod: 59 | Performed by: DERMATOLOGY

## 2024-05-30 PROCEDURE — 88341 IMHCHEM/IMCYTCHM EA ADD ANTB: CPT | Performed by: PATHOLOGY

## 2024-05-30 PROCEDURE — 13132 CMPLX RPR F/C/C/M/N/AX/G/H/F: CPT | Performed by: DERMATOLOGY

## 2024-05-30 PROCEDURE — 88305 TISSUE EXAM BY PATHOLOGIST: CPT | Performed by: PATHOLOGY

## 2024-05-30 PROCEDURE — 88342 IMHCHEM/IMCYTCHM 1ST ANTB: CPT | Performed by: PATHOLOGY

## 2024-05-30 PROCEDURE — 17311 MOHS 1 STAGE H/N/HF/G: CPT | Performed by: DERMATOLOGY

## 2024-05-30 NOTE — PROGRESS NOTES
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  Patient has no other skin complaints today.  Remainder of the HPI, Meds, PMH, Allergies, FH, and SH was reviewed in chart.      Past Medical History:   Diagnosis Date    GERD     Rash and other nonspecific skin eruption (aka PRURITIS)     Unspecified essential hypertension        Past Surgical History:   Procedure Laterality Date    COLONOSCOPY  10/12/2012    Procedure: COLONOSCOPY;  Colonoscopy, screening;  Surgeon: Kristen Alexander MD;  Location: [facility] OR    COLONOSCOPY N/A 1/22/2018    Procedure: COMBINED COLONOSCOPY, SINGLE OR MULTIPLE BIOPSY/POLYPECTOMY BY BIOPSY;;  Surgeon: Priscilla You MD;  Location:  GI    [facility]PP[facility] ENDOSCOPY  3/2006    [facility]      [facility] COLONOSCOPY THRU STOMA, DIAGNOSTIC  2007    polyps removed.         Family History   Problem Relation Age of Onset    Diabetes Father     Hypertension Father     Colon Cancer Father     Hypertension Maternal Grandmother     Heart Failure Maternal Grandmother     Diabetes Maternal Uncle     Lung Cancer Maternal Uncle        Social History     Socioeconomic History    Marital status:      Spouse name: Not on file    Number of children: Not on file    Years of education: Not on file    Highest education level: Not on file   Occupational History    Occupation: [employer]     Employer: NONE    Tobacco Use    Smoking status: Never    Smokeless tobacco: Never   Substance and Sexual Activity    Alcohol use: Yes     Alcohol/week: 0.0 standard drinks of alcohol     Comment: 1/week    Drug use: No    Sexual activity: Yes     Partners: Male   Other Topics Concern    Parent/sibling w/ CABG, MI or angioplasty before 65F 55M? No     Service No    Blood Transfusions No    Caffeine Concern No    Occupational Exposure No    Hobby Hazards No    Sleep Concern No    Stress Concern Yes      Comment: Related to life issues    Weight Concern No    Special Diet No    Back Care No    Exercise No    Bike Helmet Yes    Seat Belt Yes    Self-Exams No   Social History Narrative    Not on file     Social Determinants of Health     Financial Resource Strain: Not on file   Food Insecurity: Not on file   Transportation Needs: Not on file   Physical Activity: Not on file   Stress: Not on file   Social Connections: Not on file   Interpersonal Safety: Not on file   Housing Stability: Not on file       Outpatient Encounter Medications as of 5/30/2024   Medication Sig Dispense Refill    fluocinonide (LIDEX) 0.05 % external solution Apply topically 2 times daily To affected areas on the scalp as needed 60 mL 3    ketoconazole (NIZORAL) 2 % external cream Apply topically 2 times daily To affected area on right eyebrow or folded areas until clear. Mix with equal parts triam 0.025% 15 g 1    losartan (COZAAR) 100 MG tablet TAKE ONE TABLET BY MOUTH ONCE DAILY 90 tablet 3    triamcinolone (KENALOG) 0.025 % cream Apply topically 2 times daily To affected area on the right eyebrow or folded areas, until clear. Mix with equal parts ketoconazole 15 g 1     No facility-administered encounter medications on file as of 5/30/2024.             Review Of Systems  Skin: As above  Eyes: negative  Ears/Nose/Throat: negative  Respiratory: No shortness of breath, dyspnea on exertion, cough, or hemoptysis  Cardiovascular: negative  Gastrointestinal: negative  Genitourinary: negative  Musculoskeletal: negative  Neurologic: negative  Psychiatric: negative  Hematologic/Lymphatic/Immunologic: negative  Endocrine: negative      O:   NAD, WDWN, Alert & Oriented, Mood & Affect wnl, Vitals stable   General appearance roger ii   Vitals stable   Alert, oriented and in no acute distress     R cheek 1.2x1cm brown patch    Stuck on papules and brown macules on trunk and ext    Red papules on trunk   Flesh colored papules on trunk          Eyes:  Conjunctivae/lids:Normal     ENT: Lips, mucosa: normal    MSK:Normal    Cardiovascular: peripheral edema none    Pulm: Breathing Normal    Lymph Nodes: No Head and Neck Lymphadenopathy     Neuro/Psych: Orientation:Normal; Mood/Affect:Normal      A/P:  1. Seborrheic keratosis, lentigo, angioma, dermal nevus, hx of melanoma in situ   2. Melanoma in situ   MELANOMA DISCUSSED WITH PATIENT:  I discussed the specifics of tumor, prognosis, metachronous melanoma, self exam, and genetics with the patient. I explained the need for monthly skin exams including and taught the patient how to do this. Patient was asked about new or changing moles . I discussed with patient signs and symptoms that could arise in the setting of recurrent locoregional or metastatic disease. In addition, the need to undergo every 6 month dermatologic full skin survey and evaluation given that patients with a diagnosis of melanoma are at risk of recurrence (local and distant) and of subsequent de moe melanoma.      It was a pleasure speaking to Kristen Bernstein today.  Previous clinic  notes and pertinent laboratory tests were reviewed prior to Kristen Benrstein's visit.  Signs and Symptoms of skin cancer discussed with patient.  Patient encouraged to perform monthly skin exams.  UV precautions reviewed with patient.  Return to clinic 6 months  PROCEDURE NOTE  R cheek melanoma in situ  MELANOMA DISCUSSED WITH PATIENT:  I discussed the specifics of tumor, prognosis, metachronous melanoma, self exam, and genetics with the patient. I explained the need for monthly skin exams including and taught the patient how to do this. Patient was asked about new or changing moles . I discussed with patient signs and symptoms that could arise in the setting of recurrent locoregional or metastatic disease. In addition, the need to undergo every 6 month dermatologic full skin survey and evaluation given that patients with a diagnosis of melanoma are at risk of  recurrence (local and distant) and of subsequent de moe melanoma.  . I reviewed treatment options, including a discussion of wide excision (the gold standard) versus Mohs surgery with MART-1 immunostains.     Note: MART-1 (Melanoma Antigen Recognized by T-cells) antibody immunostaining was used during Mohs surgery as per standard protocol, in addition to routine processing of all specimens with hematoxylin and eosin. The peripheral margins/edges were processed with the MART-1 stain (2 specimens total). The center was examined with hematoxylin & eosin and MART-1 immunostains. The patient was informed of the procedure and its risk/benefits during the consent for the procedure.    One or more of the reagents used in immunohistochemical testing in this case may not have been cleared or approved by the U.S. Food and Drug Administration (FDA). The FDA has determined that such clearance or approval is not necessary. These tests are used for clinical purposes. They should not be regarded as investigational or for research. These reagents  performance characteristics have been determined by [facility]. This laboratory is certified under the Clinical Laboratory Improvement Amendments of 1988 (CLIA-88) as qualified to perform high complexity clinical laboratory testing.      MOHS:   Aggressive histology    The rationale for Mohs surgery was discussed with the patient and consent was obtained.  The risks and benefits as well as alternatives to therapy were discussed, in detail.  Specifically, the risks of infection, scarring, bleeding, prolonged wound healing, incomplete removal, allergy to anesthesia, nerve injury and recurrence were addressed.  Indication for Mohs was Aggressive histology. Prior to the procedure, the treatment site was clearly identified and, if available, confirmed with previous photos and confirmed by the patient   All components of the Universal Protocol/PAUSE rule were completed.  The  Mohs surgeon operated in two distinct and integrated capacities as the surgeon and pathologist.      The area was prepped with Betasept.  A rim of normal appearing skin was marked circumferentially around the lesion.  The area was infiltrated with local anesthesia.  The tumor was first debulked to remove all clinically apparent tumor.  An incision following the standard Mohs approach was done and the specimen was oriented,mapped and placed in 3 block(s).  Each specimen was then chromacoded and processed in the Mohs laboratory using standard Mohs technique and submitted for frozen section histology.  Frozen section analysis showed no residual tumor but CLEAR MARGINS.      Debulking sent for perms   MART1 negative    The tumor was excised using standard Mohs technique in 1 stages(s).  MART 1 stains were performed on 2 specimens. CLEAR MARGINS OBTAINED and Final defect size was 2.2 x 2 cm.     We discussed the options for wound management in full with the patient including risks/benefits/ possible outcomes.        REPAIR COMPLEX: Because of the tightness of the surrounding skin and Because of the size and full thickness nature of the defect, Because of the tightness of the surrounding skin, To maintain form and function, In order to avoid distortion, and Because of the proximity to the vermilion, a complex closure was planned. After LE anesthesia and prep, Burow's triangles were excised in the relaxed skin tension lines. The wound edges were widely undermined greater than width of the defect on both sides by dissection in the subcutaneous plane until adequate tissue mobility was obtained. Hemostasis was obtained. The wound edges were closed in a layered fashion using Vicryl and Fast Absorbing Plain Gut sutures. Postoperative length was 6.5 cm.   EBL minimal; complications none; wound care routine.  The patient was discharged in good condition and will return in one week for wound evaluation.

## 2024-05-30 NOTE — PATIENT INSTRUCTIONS
Sutured Wound Care     [facility]: [phone]    [facility]: [phone]          No strenuous activity for 48 hours. Resume moderate activity in 48 hours. No heavy exercising until you are seen for follow up in one week.     Take Tylenol as needed for discomfort.                         Do not drink alcoholic beverages for 48 hours.     Keep the pressure bandage in place for 24 hours. If the bandage becomes blood tinged or loose, reinforce it with gauze and tape.        (Refer to the reverse side of this page for management of bleeding).    Remove pressure bandage in 24 hours     Leave the flat bandage in place until your follow up appointment.    Keep the bandage dry. Wash around it carefully.    If the tape becomes soiled or starts to come off, reinforce it with additional paper tape.    Do not smoke for 3 weeks; smoking is detrimental to wound healing.    It is normal to have swelling and bruising around the surgical site. The bruising will fade in approximately 10-14 days. Elevate the area to reduce swelling.    Numbness, itchiness and sensitivity to temperature changes can occur after surgery and may take up to 18 months to normalize.      POSSIBLE COMPLICATIONS    BLEEDING:    Leave the bandage in place.  Use tightly rolled up gauze or a cloth to apply direct pressure over the bandage for 20   minutes.  Reapply pressure for an additional 20 minutes if necessary  Call the office or go to the nearest emergency room if pressure fails to stop the bleeding.  Use additional gauze and tape to maintain pressure once the bleeding has stopped.        PAIN:    Post operative pain should slowly get better, never worse.  A severe increase in pain may indicate a problem. Call the office if this occurs.    In case of emergency phone:Dr Farah [phone]

## 2024-05-30 NOTE — LETTER
5/30/2024         RE: Kristen Bernstein  [address] [place] [address]        Dear Colleague,    Thank you for referring your patient, Kristen Bernstein, to the [facility]. Please see a copy of my visit note below.    Surgical Office Location:  [facility]  [address] [place] [id number]      Kristen Bernstein , a 58 year old year old female patient, I was asked to see by SONAM Oliveros for melanoma in situ on right cheek.  Patient has no other skin complaints today.  Remainder of the HPI, Meds, PMH, Allergies, FH, and SH was reviewed in chart.      Past Medical History:   Diagnosis Date     GERD      Rash and other nonspecific skin eruption (aka PRURITIS)      Unspecified essential hypertension        Past Surgical History:   Procedure Laterality Date     COLONOSCOPY  10/12/2012    Procedure: COLONOSCOPY;  Colonoscopy, screening;  Surgeon: Kristen Alexander MD;  Location: [facility] OR     COLONOSCOPY N/A 1/22/2018    Procedure: COMBINED COLONOSCOPY, SINGLE OR MULTIPLE BIOPSY/POLYPECTOMY BY BIOPSY;;  Surgeon: Priscilla You MD;  Location:  GI     [facility]  [facility]       [facility] COLONOSCOPY THRU STOMA, DIAGNOSTIC  2007    polyps removed.         Family History   Problem Relation Age of Onset     Diabetes Father      Hypertension Father      Colon Cancer Father      Hypertension Maternal Grandmother      Heart Failure Maternal Grandmother      Diabetes Maternal Uncle      Lung Cancer Maternal Uncle        Social History     Socioeconomic History     Marital status:      Spouse name: Not on file     Number of children: Not on file     Years of education: Not on file     Highest education level: Not on file   Occupational History     Occupation: [employer] shop     Employer: NONE    Tobacco Use     Smoking status: Never     Smokeless tobacco: Never   Substance and Sexual Activity     Alcohol use:  Yes     Alcohol/week: 0.0 standard drinks of alcohol     Comment: 1/week     Drug use: No     Sexual activity: Yes     Partners: Male   Other Topics Concern     Parent/sibling w/ CABG, MI or angioplasty before 65F 55M? No      Service No     Blood Transfusions No     Caffeine Concern No     Occupational Exposure No     Hobby Hazards No     Sleep Concern No     Stress Concern Yes     Comment: Related to life issues     Weight Concern No     Special Diet No     Back Care No     Exercise No     Bike Helmet Yes     Seat Belt Yes     Self-Exams No   Social History Narrative     Not on file     Social Determinants of Health     Financial Resource Strain: Not on file   Food Insecurity: Not on file   Transportation Needs: Not on file   Physical Activity: Not on file   Stress: Not on file   Social Connections: Not on file   Interpersonal Safety: Not on file   Housing Stability: Not on file       Outpatient Encounter Medications as of 5/30/2024   Medication Sig Dispense Refill     fluocinonide (LIDEX) 0.05 % external solution Apply topically 2 times daily To affected areas on the scalp as needed 60 mL 3     ketoconazole (NIZORAL) 2 % external cream Apply topically 2 times daily To affected area on right eyebrow or folded areas until clear. Mix with equal parts triam 0.025% 15 g 1     losartan (COZAAR) 100 MG tablet TAKE ONE TABLET BY MOUTH ONCE DAILY 90 tablet 3     triamcinolone (KENALOG) 0.025 % cream Apply topically 2 times daily To affected area on the right eyebrow or folded areas, until clear. Mix with equal parts ketoconazole 15 g 1     No facility-administered encounter medications on file as of 5/30/2024.             Review Of Systems  Skin: As above  Eyes: negative  Ears/Nose/Throat: negative  Respiratory: No shortness of breath, dyspnea on exertion, cough, or hemoptysis  Cardiovascular: negative  Gastrointestinal: negative  Genitourinary: negative  Musculoskeletal: negative  Neurologic:  negative  Psychiatric: negative  Hematologic/Lymphatic/Immunologic: negative  Endocrine: negative      O:   NAD, WDWN, Alert & Oriented, Mood & Affect wnl, Vitals stable   General appearance roger ii   Vitals stable   Alert, oriented and in no acute distress     R cheek 1.2x1cm brown patch    Stuck on papules and brown macules on trunk and ext    Red papules on trunk   Flesh colored papules on trunk          Eyes: Conjunctivae/lids:Normal     ENT: Lips, mucosa: normal    MSK:Normal    Cardiovascular: peripheral edema none    Pulm: Breathing Normal    Lymph Nodes: No Head and Neck Lymphadenopathy     Neuro/Psych: Orientation:Normal; Mood/Affect:Normal      A/P:  1. Seborrheic keratosis, lentigo, angioma, dermal nevus, hx of melanoma in situ   2. Melanoma in situ   MELANOMA DISCUSSED WITH PATIENT:  I discussed the specifics of tumor, prognosis, metachronous melanoma, self exam, and genetics with the patient. I explained the need for monthly skin exams including and taught the patient how to do this. Patient was asked about new or changing moles . I discussed with patient signs and symptoms that could arise in the setting of recurrent locoregional or metastatic disease. In addition, the need to undergo every 6 month dermatologic full skin survey and evaluation given that patients with a diagnosis of melanoma are at risk of recurrence (local and distant) and of subsequent de moe melanoma.      It was a pleasure speaking to Kristen Bernstein today.  Previous clinic  notes and pertinent laboratory tests were reviewed prior to Kristen Bernstein's visit.  Signs and Symptoms of skin cancer discussed with patient.  Patient encouraged to perform monthly skin exams.  UV precautions reviewed with patient.  Return to clinic 6 months  PROCEDURE NOTE  R cheek melanoma in situ  MELANOMA DISCUSSED WITH PATIENT:  I discussed the specifics of tumor, prognosis, metachronous melanoma, self exam, and genetics with the patient. I explained  the need for monthly skin exams including and taught the patient how to do this. Patient was asked about new or changing moles . I discussed with patient signs and symptoms that could arise in the setting of recurrent locoregional or metastatic disease. In addition, the need to undergo every 6 month dermatologic full skin survey and evaluation given that patients with a diagnosis of melanoma are at risk of recurrence (local and distant) and of subsequent de moe melanoma.  . I reviewed treatment options, including a discussion of wide excision (the gold standard) versus Mohs surgery with MART-1 immunostains.     Note: MART-1 (Melanoma Antigen Recognized by T-cells) antibody immunostaining was used during Mohs surgery as per standard protocol, in addition to routine processing of all specimens with hematoxylin and eosin. The peripheral margins/edges were processed with the MART-1 stain (2 specimens total). The center was examined with hematoxylin & eosin and MART-1 immunostains. The patient was informed of the procedure and its risk/benefits during the consent for the procedure.    One or more of the reagents used in immunohistochemical testing in this case may not have been cleared or approved by the U.S. Food and Drug Administration (FDA). The FDA has determined that such clearance or approval is not necessary. These tests are used for clinical purposes. They should not be regarded as investigational or for research. These reagents  performance characteristics have been determined by [facility]. This laboratory is certified under the Clinical Laboratory Improvement Amendments of 1988 (CLIA-88) as qualified to perform high complexity clinical laboratory testing.      MOHS:   Aggressive histology    The rationale for Mohs surgery was discussed with the patient and consent was obtained.  The risks and benefits as well as alternatives to therapy were discussed, in detail.  Specifically, the risks of  infection, scarring, bleeding, prolonged wound healing, incomplete removal, allergy to anesthesia, nerve injury and recurrence were addressed.  Indication for Mohs was Aggressive histology. Prior to the procedure, the treatment site was clearly identified and, if available, confirmed with previous photos and confirmed by the patient   All components of the Universal Protocol/PAUSE rule were completed.  The Mohs surgeon operated in two distinct and integrated capacities as the surgeon and pathologist.      The area was prepped with Betasept.  A rim of normal appearing skin was marked circumferentially around the lesion.  The area was infiltrated with local anesthesia.  The tumor was first debulked to remove all clinically apparent tumor.  An incision following the standard Mohs approach was done and the specimen was oriented,mapped and placed in 3 block(s).  Each specimen was then chromacoded and processed in the Mohs laboratory using standard Mohs technique and submitted for frozen section histology.  Frozen section analysis showed no residual tumor but CLEAR MARGINS.      Debulking sent for perms   MART1 negative    The tumor was excised using standard Mohs technique in 1 stages(s).  MART 1 stains were performed on 2 specimens. CLEAR MARGINS OBTAINED and Final defect size was 2.2 x 2 cm.     We discussed the options for wound management in full with the patient including risks/benefits/ possible outcomes.        REPAIR COMPLEX: Because of the tightness of the surrounding skin and Because of the size and full thickness nature of the defect, Because of the tightness of the surrounding skin, To maintain form and function, In order to avoid distortion, and Because of the proximity to the vermilion, a complex closure was planned. After LE anesthesia and prep, Burow's triangles were excised in the relaxed skin tension lines. The wound edges were widely undermined greater than width of the defect on both sides by  dissection in the subcutaneous plane until adequate tissue mobility was obtained. Hemostasis was obtained. The wound edges were closed in a layered fashion using Vicryl and Fast Absorbing Plain Gut sutures. Postoperative length was 6.5 cm.   EBL minimal; complications none; wound care routine.  The patient was discharged in good condition and will return in one week for wound evaluation.      Again, thank you for allowing me to participate in the care of your patient.        Sincerely,        Neil Farah MD

## 2024-06-04 ENCOUNTER — OFFICE VISIT (OUTPATIENT)
Dept: FAMILY MEDICINE | Facility: CLINIC | Age: 59
End: 2024-06-04
Payer: COMMERCIAL

## 2024-06-04 VITALS
OXYGEN SATURATION: 97 % | DIASTOLIC BLOOD PRESSURE: 80 MMHG | HEART RATE: 69 BPM | BODY MASS INDEX: 32.77 KG/M2 | WEIGHT: 178.1 LBS | SYSTOLIC BLOOD PRESSURE: 126 MMHG | TEMPERATURE: 98.8 F | RESPIRATION RATE: 16 BRPM | HEIGHT: 62 IN

## 2024-06-04 DIAGNOSIS — I10 HYPERTENSION GOAL BP (BLOOD PRESSURE) < 140/90: ICD-10-CM

## 2024-06-04 DIAGNOSIS — Z00.00 ANNUAL PHYSICAL EXAM: Primary | ICD-10-CM

## 2024-06-04 DIAGNOSIS — Z23 NEED FOR COVID-19 VACCINE: ICD-10-CM

## 2024-06-04 PROCEDURE — 90480 ADMN SARSCOV2 VAC 1/ONLY CMP: CPT | Performed by: FAMILY MEDICINE

## 2024-06-04 PROCEDURE — 91320 SARSCV2 VAC 30MCG TRS-SUC IM: CPT | Performed by: FAMILY MEDICINE

## 2024-06-04 PROCEDURE — 36415 COLL VENOUS BLD VENIPUNCTURE: CPT | Performed by: FAMILY MEDICINE

## 2024-06-04 PROCEDURE — 80061 LIPID PANEL: CPT | Performed by: FAMILY MEDICINE

## 2024-06-04 PROCEDURE — 80053 COMPREHEN METABOLIC PANEL: CPT | Performed by: FAMILY MEDICINE

## 2024-06-04 PROCEDURE — 99396 PREV VISIT EST AGE 40-64: CPT | Mod: 25 | Performed by: FAMILY MEDICINE

## 2024-06-04 RX ORDER — LOSARTAN POTASSIUM 100 MG/1
TABLET ORAL
Qty: 90 TABLET | Refills: 3 | Status: SHIPPED | OUTPATIENT
Start: 2024-06-04

## 2024-06-04 SDOH — HEALTH STABILITY: PHYSICAL HEALTH: ON AVERAGE, HOW MANY DAYS PER WEEK DO YOU ENGAGE IN MODERATE TO STRENUOUS EXERCISE (LIKE A BRISK WALK)?: 0 DAYS

## 2024-06-04 ASSESSMENT — SOCIAL DETERMINANTS OF HEALTH (SDOH): HOW OFTEN DO YOU GET TOGETHER WITH FRIENDS OR RELATIVES?: MORE THAN THREE TIMES A WEEK

## 2024-06-04 ASSESSMENT — PAIN SCALES - GENERAL: PAINLEVEL: NO PAIN (0)

## 2024-06-04 NOTE — PROGRESS NOTES
"Preventive Care Visit  [facility] MELVA Soto MD, Family Medicine  Jun 4, 2024      Assessment & Plan     Annual physical exam      Hypertension goal BP (blood pressure) < 140/90  Well controlled  - losartan (COZAAR) 100 MG tablet; TAKE ONE TABLET BY MOUTH ONCE DAILY  - Lipid panel reflex to direct LDL Fasting; Future  - Comprehensive metabolic panel; Future  - Lipid panel reflex to direct LDL Fasting  - Comprehensive metabolic panel    Need for COVID-19 vaccine    - COVID-19 12+ (2023-24) (PFIZER)            BMI  Estimated body mass index is 32.57 kg/m  as calculated from the following:    Height as of this encounter: 1.575 m (5' 2\").    Weight as of this encounter: 80.8 kg (178 lb 1.6 oz).       Counseling  Appropriate preventive services were discussed with this patient, including applicable screening as appropriate for fall prevention, nutrition, physical activity, Tobacco-use cessation, weight loss and cognition.  Checklist reviewing preventive services available has been given to the patient.  Reviewed patient's diet, addressing concerns and/or questions.           Kuldip Peterson is a 58 year old, presenting for the following:  Physical (Fasting )        6/4/2024     9:48 AM   Additional Questions   Roomed by Amelia STANLEY        Health Care Directive  Patient does not have a Health Care Directive or Living Will: Patient states has Advance Directive and will bring in a copy to clinic.    HPI        Hypertension Follow-up    Do you check your blood pressure regularly outside of the clinic? Yes   Are you following a low salt diet? Yes  Are your blood pressures ever more than 140 on the top number (systolic) OR more   than 90 on the bottom number (diastolic), for example 140/90? No      6/4/2024   General Health   How would you rate your overall physical health? Good   Feel stress (tense, anxious, or unable to sleep) Not at all         6/4/2024   Nutrition   Three or more servings of " calcium each day? (!) I DON'T KNOW   Diet: Regular (no restrictions)   How many servings of fruit and vegetables per day? (!) 0-1   How many sweetened beverages each day? 0-1         6/4/2024   Exercise   Days per week of moderate/strenous exercise 0 days   (!) EXERCISE CONCERN      6/4/2024   Social Factors   Frequency of gathering with friends or relatives More than three times a week   Worry food won't last until get money to buy more No   Food not last or not have enough money for food? No   Do you have housing?  Yes   Are you worried about losing your housing? No   Lack of transportation? No   Unable to get utilities (heat,electricity)? No         6/4/2024   Fall Risk   Fallen 2 or more times in the past year? No   Trouble with walking or balance? No          6/4/2024   Dental   Dentist two times every year? Yes         6/4/2024   TB Screening   Were you born outside of the [place]? No         Today's PHQ-2 Score:       6/4/2024     9:29 AM   PHQ-2 ( 1999 Pfizer)   Q1: Little interest or pleasure in doing things 0   Q2: Feeling down, depressed or hopeless 0   PHQ-2 Score 0   Q1: Little interest or pleasure in doing things Not at all   Q2: Feeling down, depressed or hopeless Not at all   PHQ-2 Score 0           6/4/2024   Substance Use   Alcohol more than 3/day or more than 7/wk No   Do you use any other substances recreationally? No     Social History     Tobacco Use    Smoking status: Never     Passive exposure: Never    Smokeless tobacco: Never   Vaping Use    Vaping status: Never Used   Substance Use Topics    Alcohol use: Yes     Alcohol/week: 0.0 standard drinks of alcohol     Comment: 1/week    Drug use: No           10/11/2023   LAST FHS-7 RESULTS   1st degree relative breast or ovarian cancer No   Any relative bilateral breast cancer No   Any male have breast cancer No   Any ONE woman have BOTH breast AND ovarian cancer No   Any woman with breast cancer before 50yrs No   2 or more relatives with breast  AND/OR ovarian cancer No   2 or more relatives with breast AND/OR bowel cancer No        Mammogram Screening - Mammogram every 1-2 years updated in Health Maintenance based on mutual decision making        6/4/2024   STI Screening   New sexual partner(s) since last STI/HIV test? No     History of abnormal Pap smear: No - age 30- 64 PAP with HPV every 5 years recommended        Latest Ref Rng & Units 1/8/2021     1:23 PM 1/8/2021     1:22 PM 11/12/2015     8:48 AM   PAP / HPV   PAP (Historical)   NIL     HPV 16 DNA NEG^Negative Negative   Negative    HPV 18 DNA NEG^Negative Negative   Negative    Other HR HPV NEG^Negative Negative   Negative      ASCVD Risk   The 10-year ASCVD risk score (Neema THAO, et al., 2019) is: 4%    Values used to calculate the score:      Age: 58 years      Sex: Female      Is Non- : No      Diabetic: No      Tobacco smoker: No      Systolic Blood Pressure: 126 mmHg      Is BP treated: Yes      HDL Cholesterol: 47 mg/dL      Total Cholesterol: 179 mg/dL           Reviewed and updated as needed this visit by Provider    Allergies      Fam Hx            Patient Active Problem List   Diagnosis    Hypertension goal BP (blood pressure) < 140/90    Family history of colon cancer    GERD (gastroesophageal reflux disease)    CARDIOVASCULAR SCREENING; LDL GOAL LESS THAN 130    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        Social History     Tobacco Use    Smoking status: Never     Passive exposure: Never    Smokeless tobacco: Never   Substance Use Topics    Alcohol use: Yes     Alcohol/week: 0.0 standard drinks of alcohol     Comment: 1/week     Family History   Problem Relation Age of Onset    Breast Cancer Mother 78    Osteoporosis Mother     Diabetes Father     Hypertension Father     Colon Cancer Father     Hypertension Maternal Grandmother     Heart Failure Maternal Grandmother     Diabetes Maternal Uncle     Lung Cancer Maternal Uncle          Current Outpatient Medications   Medication Sig Dispense Refill    fluocinonide (LIDEX) 0.05 % external solution Apply topically 2 times daily To affected areas on the scalp as needed 60 mL 3    ketoconazole (NIZORAL) 2 % external cream Apply topically 2 times daily To affected area on right eyebrow or folded areas until clear. Mix with equal parts triam 0.025% 15 g 1    losartan (COZAAR) 100 MG tablet TAKE ONE TABLET BY MOUTH ONCE DAILY 90 tablet 3    triamcinolone (KENALOG) 0.025 % cream Apply topically 2 times daily To affected area on the right eyebrow or folded areas, until clear. Mix with equal parts ketoconazole 15 g 1     No Known Allergies      Review of Systems  CONSTITUTIONAL: NEGATIVE for fever, chills, change in weight  INTEGUMENTARY/SKIN: NEGATIVE for worrisome rashes, moles or lesions  EYES: NEGATIVE for vision changes or irritation  ENT/MOUTH: NEGATIVE for ear, mouth and throat problems  RESP: NEGATIVE for significant cough or SOB  BREAST: NEGATIVE for masses, tenderness or discharge  CV: NEGATIVE for chest pain, palpitations or peripheral edema  GI: NEGATIVE for nausea, abdominal pain, heartburn, or change in bowel habits  : NEGATIVE for frequency, dysuria, or hematuria  MUSCULOSKELETAL: NEGATIVE for significant arthralgias or myalgia  NEURO: NEGATIVE for weakness, dizziness or paresthesias  ENDOCRINE: NEGATIVE for temperature intolerance,  "skin/hair changes  HEME: NEGATIVE for bleeding problems  PSYCHIATRIC: NEGATIVE for changes in mood or affect     Objective    Exam  /80   Pulse 69   Temp 98.8  F (37.1  C) (Tympanic)   Resp 16   Ht 1.575 m (5' 2\")   Wt 80.8 kg (178 lb 1.6 oz)   LMP 05/15/2015 (Exact Date)   SpO2 97%   BMI 32.57 kg/m     Estimated body mass index is 32.57 kg/m  as calculated from the following:    Height as of this encounter: 1.575 m (5' 2\").    Weight as of this encounter: 80.8 kg (178 lb 1.6 oz).    Physical Exam  GENERAL: alert and no distress  EYES: Eyes grossly normal to inspection, PERRL and conjunctivae and sclerae normal  HENT: ear canals and TM's normal, nose and mouth without ulcers or lesions  NECK: no adenopathy, no asymmetry, masses, or scars  RESP: lungs clear to auscultation - no rales, rhonchi or wheezes  BREAST: normal without masses, tenderness or nipple discharge and no palpable axillary masses or adenopathy  CV: regular rate and rhythm, normal S1 S2, no S3 or S4, no murmur, click or rub, no peripheral edema  ABDOMEN: soft, nontender, no hepatosplenomegaly, no masses and bowel sounds normal  MS: no gross musculoskeletal defects noted, no edema  SKIN: no suspicious lesions or rashes  NEURO: Normal strength and tone, mentation intact and speech normal  PSYCH: mentation appears normal, affect normal/bright        Signed Electronically by: Evelin Soto MD    "

## 2024-06-05 LAB
ALBUMIN SERPL BCG-MCNC: 4.3 G/DL (ref 3.5–5.2)
ALP SERPL-CCNC: 117 U/L (ref 40–150)
ALT SERPL W P-5'-P-CCNC: 28 U/L (ref 0–50)
ANION GAP SERPL CALCULATED.3IONS-SCNC: 12 MMOL/L (ref 7–15)
AST SERPL W P-5'-P-CCNC: 32 U/L (ref 0–45)
BILIRUB SERPL-MCNC: 0.6 MG/DL
BUN SERPL-MCNC: 13.8 MG/DL (ref 8–23)
CALCIUM SERPL-MCNC: 9.4 MG/DL (ref 8.6–10)
CHLORIDE SERPL-SCNC: 103 MMOL/L (ref 98–107)
CHOLEST SERPL-MCNC: 169 MG/DL
CREAT SERPL-MCNC: 1.07 MG/DL (ref 0.51–0.95)
DEPRECATED HCO3 PLAS-SCNC: 24 MMOL/L (ref 22–29)
EGFRCR SERPLBLD CKD-EPI 2021: 60 ML/MIN/1.73M2
FASTING STATUS PATIENT QL REPORTED: YES
FASTING STATUS PATIENT QL REPORTED: YES
GLUCOSE SERPL-MCNC: 91 MG/DL (ref 70–99)
HDLC SERPL-MCNC: 41 MG/DL
LDLC SERPL CALC-MCNC: 105 MG/DL
NONHDLC SERPL-MCNC: 128 MG/DL
POTASSIUM SERPL-SCNC: 5.2 MMOL/L (ref 3.4–5.3)
PROT SERPL-MCNC: 7.3 G/DL (ref 6.4–8.3)
SODIUM SERPL-SCNC: 139 MMOL/L (ref 135–145)
TRIGL SERPL-MCNC: 115 MG/DL

## 2024-06-06 LAB
PATH REPORT.COMMENTS IMP SPEC: NORMAL
PATH REPORT.FINAL DX SPEC: NORMAL
PATH REPORT.GROSS SPEC: NORMAL
PATH REPORT.MICROSCOPIC SPEC OTHER STN: NORMAL
PATH REPORT.RELEVANT HX SPEC: NORMAL

## 2024-06-07 ENCOUNTER — ALLIED HEALTH/NURSE VISIT (OUTPATIENT)
Dept: DERMATOLOGY | Facility: CLINIC | Age: 59
End: 2024-06-07
Payer: COMMERCIAL

## 2024-06-07 DIAGNOSIS — Z48.01 DRESSING CHANGE OR REMOVAL, SURGICAL WOUND: Primary | ICD-10-CM

## 2024-06-07 PROCEDURE — 99207 PR NO CHARGE NURSE ONLY: CPT

## 2024-06-07 NOTE — PATIENT INSTRUCTIONS
WOUND CARE INSTRUCTIONS  for  ONE WEEK AFTER SURGERY          Leave flat bandage on your skin for one week after today s bandage change.  In one week when you remove the bandage, you may resume your regular skin care routine, including washing with mild soap and water, applying moisturizer, make-up and sunscreen.    If there are any open or bleeding areas at the incision/graft site you should begin to cover the area with a bandage daily as follows:    Clean and dry the area with plain tap water using a Q-tip or sterile gauze pad.  Apply Polysporin or Bacitracin ointment to the open area.  Cover the wound with a band-aid or a sterile non-stick gauze pad and micropore paper tape.         SIGNS OF INFECTION  - If you notice any of these signs of infection, call your doctor right away: expanding redness around the wound.  - Yellow or greenish-colored pus or cloudy wound drainage.    - Red streaking spreading from the wound.  - Increased swelling, tenderness, or pain around the wound.   - Fever.    Please remember that yellow and clear drainage from a wound can be normal and related to normal wound healing.  Isolated drainage from a wound without a combination of the above features does not indicate infection.       *Once the bandages are removed, the scar will be red and firm (especially in the lip/chin area). This is normal and will fade in time. It might take 6-12 months for this to happen.     *Massaging the area will help the scar soften and fade quicker. Begin to massage the area one month after the bandages have been removed. To massage apply pressure directly and firmly over the scar with the fingertips and move in a circular motion. Massage the area for a few minutes several times a day. Continue to massage the site for several months.    *Approximately 6-8 weeks after surgery it is not uncommon to see the formation of  tender pimple-like  bump along the scar. This is normal. As the scar continues to mature and  the stitches underneath the skin begin to dissolve, this might occur. Do not pick or squeeze, this will resolve on it s own. Should one break open producing a small amount of drainage, apply Polysporin or Bacitracin ointment a few times a day until the wound is completely healed.    *Numbness in the surgical area is expected. It might take 12-18 months for the feeling to return to normal. During this time sensations of itchiness, tingling and occasional sharp pains might be noted. These feelings are normal and will subside once the nerves have completely healed.         IN CASE OF EMERGENCY: Dr Farah [phone]     If you were seen in [place] call: [phone]    If you were seen in [place] call: [phone]

## 2024-06-07 NOTE — PROGRESS NOTES
Kristen Bernstein comes into clinic today at the request of Dr. Farah Ordering Provider for Dressing Change   .    This service provided today was under the supervising provider of the day Angeles Lewis PA-C, who was available if needed.    Please see providers note on 5/30/24 for orders  Patient returned to clinic for post surgery 1 week follow up bandage change. Patient has no complaints, denies pain.  Bandage removed from R cheek. Area cleansed with wound cleanser. Site is healing with no signs of infection, wound edges approximating well.   Reapplied new steri strips and paper tape.     Advised to watch for signs and symptons of infection; spreading redness, increasing pain, drainage, odor, fever.   Call or report promptly to clinic if any of these symptoms are present.    Wound care post op instructions given and discussed for 14 day bandage removal and continued incision/scar care.    Patient verbalized understanding.

## 2024-09-05 ENCOUNTER — OFFICE VISIT (OUTPATIENT)
Dept: DERMATOLOGY | Facility: CLINIC | Age: 59
End: 2024-09-05
Payer: COMMERCIAL

## 2024-09-05 DIAGNOSIS — Z86.006 HISTORY OF MELANOMA IN SITU: Primary | ICD-10-CM

## 2024-09-05 PROCEDURE — 99212 OFFICE O/P EST SF 10 MIN: CPT | Performed by: DERMATOLOGY

## 2024-09-05 NOTE — LETTER
9/5/2024      Kristen Bernstein  [address] [place] [address]      Dear Colleague,    Thank you for referring your patient, Kristen Bernstein, to the [facility]. Please see a copy of my visit note below.    Kristen Bernstein is an extremely pleasant 58 year old year old female patient here today for hx of melanoma in situ right cheek well healed with some pigment.  Patient has no other skin complaints today.  Remainder of the HPI, Meds, PMH, Allergies, FH, and SH was reviewed in chart.      Past Medical History:   Diagnosis Date     GERD      Malignant melanoma (H)      Rash and other nonspecific skin eruption (aka PRURITIS)      Unspecified essential hypertension        Past Surgical History:   Procedure Laterality Date     COLONOSCOPY  10/12/2012    Procedure: COLONOSCOPY;  Colonoscopy, screening;  Surgeon: Kristen Alexander MD;  Location:  OR     COLONOSCOPY N/A 1/22/2018    Procedure: COMBINED COLONOSCOPY, SINGLE OR MULTIPLE BIOPSY/POLYPECTOMY BY BIOPSY;;  Surgeon: Priscilla You MD;  Location:  GI     [facility]       [facility] COLONOS[facility] THRU STOMA, DIAGNOSTIC  2007    polyps removed.         Family History   Problem Relation Age of Onset     Breast Cancer Mother 78     Osteoporosis Mother      Diabetes Father      Hypertension Father      Colon Cancer Father      Hypertension Maternal Grandmother      Heart Failure Maternal Grandmother      Diabetes Maternal Uncle      Lung Cancer Maternal Uncle        Social History     Socioeconomic History     Marital status:      Spouse name: Not on file     Number of children: Not on file     Years of education: Not on file     Highest education level: Not on file   Occupational History     Occupation: [employer]     Employer: NONE    Tobacco Use     Smoking status: Never     Passive exposure: Never     Smokeless tobacco: Never   Vaping Use     Vaping  status: Never Used   Substance and Sexual Activity     Alcohol use: Yes     Alcohol/week: 0.0 standard drinks of alcohol     Comment: 1/week     Drug use: No     Sexual activity: Yes     Partners: Male   Other Topics Concern     Parent/sibling w/ CABG, MI or angioplasty before 65F 55M? No      Service No     Blood Transfusions No     Caffeine Concern No     Occupational Exposure No     Hobby Hazards No     Sleep Concern No     Stress Concern Yes     Comment: Related to life issues     Weight Concern No     Special Diet No     Back Care No     Exercise No     Bike Helmet Yes     Seat Belt Yes     Self-Exams No   Social History Narrative     Not on file     Social Determinants of Health     Financial Resource Strain: Low Risk  (6/4/2024)    Financial Resource Strain      Within the past 12 months, have you or your family members you live with been unable to get utilities (heat, electricity) when it was really needed?: No   Food Insecurity: Low Risk  (6/4/2024)    Food Insecurity      Within the past 12 months, did you worry that your food would run out before you got money to buy more?: No      Within the past 12 months, did the food you bought just not last and you didn t have money to get more?: No   Transportation Needs: Low Risk  (6/4/2024)    Transportation Needs      Within the past 12 months, has lack of transportation kept you from medical appointments, getting your medicines, non-medical meetings or appointments, work, or from getting things that you need?: No   Physical Activity: Unknown (6/4/2024)    Exercise Vital Sign      Days of Exercise per Week: 0 days      Minutes of Exercise per Session: Not on file   Stress: No Stress Concern Present (6/4/2024)    [employer] - Occupational Stress Questionnaire      Feeling of Stress : Not at all   Social Connections: Unknown (6/4/2024)    Social Connection and Isolation Panel [NHANES]      Frequency of Communication with  Friends and Family: Not on file      Frequency of Social Gatherings with Friends and Family: More than three times a week      Attends Sikhism Services: Not on file      Active Member of Clubs or Organizations: Not on file      Attends Club or Organization Meetings: Not on file      Marital Status: Not on file   Interpersonal Safety: Low Risk  (6/4/2024)    Interpersonal Safety      Do you feel physically and emotionally safe where you currently live?: Yes      Within the past 12 months, have you been hit, slapped, kicked or otherwise physically hurt by someone?: No      Within the past 12 months, have you been humiliated or emotionally abused in other ways by your partner or ex-partner?: No   Housing Stability: Low Risk  (6/4/2024)    Housing Stability      Do you have housing? : Yes      Are you worried about losing your housing?: No       Outpatient Encounter Medications as of 9/5/2024   Medication Sig Dispense Refill     fluocinonide (LIDEX) 0.05 % external solution Apply topically 2 times daily To affected areas on the scalp as needed 60 mL 3     ketoconazole (NIZORAL) 2 % external cream Apply topically 2 times daily To affected area on right eyebrow or folded areas until clear. Mix with equal parts triam 0.025% 15 g 1     losartan (COZAAR) 100 MG tablet TAKE ONE TABLET BY MOUTH ONCE DAILY 90 tablet 3     triamcinolone (KENALOG) 0.025 % cream Apply topically 2 times daily To affected area on the right eyebrow or folded areas, until clear. Mix with equal parts ketoconazole 15 g 1     No facility-administered encounter medications on file as of 9/5/2024.             O:   NAD, WDWN, Alert & Oriented, Mood & Affect wnl, Vitals stable   General appearance normal   Vitals stable   Alert, oriented and in no acute distress     Post inflammatory pigmentation of cheek        Eyes: Conjunctivae/lids:Normal     ENT: Lips, mucosa: normal    MSK:Normal    Cardiovascular: peripheral edema none    Pulm: Breathing  Normal    Neuro/Psych: Orientation:Alert and Orientedx3 ; Mood/Affect:normal       A/P:  Hx of melanoma in situ   Healed well  Ipl discussed with patient   Schedule ipl   It was a pleasure speaking to Krsiten Bernstein today.  Previous clinic notes and pertinent laboratory tests were reviewed prior to Kristen Bernstein's visit.      Again, thank you for allowing me to participate in the care of your patient.        Sincerely,        Neil Farah MD

## 2024-09-05 NOTE — PROGRESS NOTES
Kristen Bernstein is an extremely pleasant 58 year old year old female patient here today for hx of melanoma in situ right cheek well healed with some pigment.  Patient has no other skin complaints today.  Remainder of the HPI, Meds, PMH, Allergies, FH, and SH was reviewed in chart.      Past Medical History:   Diagnosis Date    GERD     Malignant melanoma (H)     Rash and other nonspecific skin eruption (aka PRURITIS)     Unspecified essential hypertension        Past Surgical History:   Procedure Laterality Date    COLONOSCOPY  10/12/2012    Procedure: COLONOSCOPY;  Colonoscopy, screening;  Surgeon: Kristen Alexander MD;  Location: [facility] OR    COLONOSCOPY N/A 1/22/2018    Procedure: COMBINED COLONOSCOPY, SINGLE OR MULTIPLE BIOPSY/POLYPECTOMY BY BIOPSY;;  Surgeon: Priscilla You MD;  Location:  GI    UPPER GI ENDOSCOPY  3/2006    [facility] C-SEC ONLY,PREV C-SEC      [facility] COLONOSCOPY THRU STOMA, DIAGNOSTIC  2007    polyps removed.         Family History   Problem Relation Age of Onset    Breast Cancer Mother 78    Osteoporosis Mother     Diabetes Father     Hypertension Father     Colon Cancer Father     Hypertension Maternal Grandmother     Heart Failure Maternal Grandmother     Diabetes Maternal Uncle     Lung Cancer Maternal Uncle        Social History     Socioeconomic History    Marital status:      Spouse name: Not on file    Number of children: Not on file    Years of education: Not on file    Highest education level: Not on file   Occupational History    Occupation: [employer]     Employer: NONE    Tobacco Use    Smoking status: Never     Passive exposure: Never    Smokeless tobacco: Never   Vaping Use    Vaping status: Never Used   Substance and Sexual Activity    Alcohol use: Yes     Alcohol/week: 0.0 standard drinks of alcohol     Comment: 1/week    Drug use: No    Sexual activity: Yes     Partners: Male   Other Topics Concern    Parent/sibling w/ CABG, MI or angioplasty before  65F 55M? No     Service No    Blood Transfusions No    Caffeine Concern No    Occupational Exposure No    Hobby Hazards No    Sleep Concern No    Stress Concern Yes     Comment: Related to life issues    Weight Concern No    Special Diet No    Back Care No    Exercise No    Bike Helmet Yes    Seat Belt Yes    Self-Exams No   Social History Narrative    Not on file     Social Determinants of Health     Financial Resource Strain: Low Risk  (6/4/2024)    Financial Resource Strain     Within the past 12 months, have you or your family members you live with been unable to get utilities (heat, electricity) when it was really needed?: No   Food Insecurity: Low Risk  (6/4/2024)    Food Insecurity     Within the past 12 months, did you worry that your food would run out before you got money to buy more?: No     Within the past 12 months, did the food you bought just not last and you didn t have money to get more?: No   Transportation Needs: Low Risk  (6/4/2024)    Transportation Needs     Within the past 12 months, has lack of transportation kept you from medical appointments, getting your medicines, non-medical meetings or appointments, work, or from getting things that you need?: No   Physical Activity: Unknown (6/4/2024)    Exercise Vital Sign     Days of Exercise per Week: 0 days     Minutes of Exercise per Session: Not on file   Stress: No Stress Concern Present (6/4/2024)    [employer] [place] [employer] - Occupational Stress Questionnaire     Feeling of Stress : Not at all   Social Connections: Unknown (6/4/2024)    Social Connection and Isolation Panel [NHANES]     Frequency of Communication with Friends and Family: Not on file     Frequency of Social Gatherings with Friends and Family: More than three times a week     Attends L[employer]-day Services: Not on file     Active Member of Clubs or Organizations: Not on file     Attends Club or Organization Meetings: Not on file     Marital Status: Not on  file   Interpersonal Safety: Low Risk  (6/4/2024)    Interpersonal Safety     Do you feel physically and emotionally safe where you currently live?: Yes     Within the past 12 months, have you been hit, slapped, kicked or otherwise physically hurt by someone?: No     Within the past 12 months, have you been humiliated or emotionally abused in other ways by your partner or ex-partner?: No   Housing Stability: Low Risk  (6/4/2024)    Housing Stability     Do you have housing? : Yes     Are you worried about losing your housing?: No       Outpatient Encounter Medications as of 9/5/2024   Medication Sig Dispense Refill    fluocinonide (LIDEX) 0.05 % external solution Apply topically 2 times daily To affected areas on the scalp as needed 60 mL 3    ketoconazole (NIZORAL) 2 % external cream Apply topically 2 times daily To affected area on right eyebrow or folded areas until clear. Mix with equal parts triam 0.025% 15 g 1    losartan (COZAAR) 100 MG tablet TAKE ONE TABLET BY MOUTH ONCE DAILY 90 tablet 3    triamcinolone (KENALOG) 0.025 % cream Apply topically 2 times daily To affected area on the right eyebrow or folded areas, until clear. Mix with equal parts ketoconazole 15 g 1     No facility-administered encounter medications on file as of 9/5/2024.             O:   NAD, WDWN, Alert & Oriented, Mood & Affect wnl, Vitals stable   General appearance normal   Vitals stable   Alert, oriented and in no acute distress     Post inflammatory pigmentation of cheek        Eyes: Conjunctivae/lids:Normal     ENT: Lips, mucosa: normal    MSK:Normal    Cardiovascular: peripheral edema none    Pulm: Breathing Normal    Neuro/Psych: Orientation:Alert and Orientedx3 ; Mood/Affect:normal       A/P:  Hx of melanoma in situ   Healed well  Ipl discussed with patient   Schedule ipl   It was a pleasure speaking to Kristen Bernstein today.  Previous clinic notes and pertinent laboratory tests were reviewed prior to Kristen Bernstein's visit.

## 2024-10-02 ENCOUNTER — ANCILLARY ORDERS (OUTPATIENT)
Dept: FAMILY MEDICINE | Facility: CLINIC | Age: 59
End: 2024-10-02

## 2024-10-02 DIAGNOSIS — Z12.31 VISIT FOR SCREENING MAMMOGRAM: Primary | ICD-10-CM

## 2024-10-16 ENCOUNTER — ANCILLARY PROCEDURE (OUTPATIENT)
Dept: MAMMOGRAPHY | Facility: CLINIC | Age: 59
End: 2024-10-16
Attending: FAMILY MEDICINE
Payer: COMMERCIAL

## 2024-10-16 DIAGNOSIS — Z12.31 VISIT FOR SCREENING MAMMOGRAM: ICD-10-CM

## 2024-10-16 PROCEDURE — 77067 SCR MAMMO BI INCL CAD: CPT | Mod: TC | Performed by: RADIOLOGY

## 2024-10-16 PROCEDURE — 77063 BREAST TOMOSYNTHESIS BI: CPT | Mod: TC | Performed by: RADIOLOGY

## 2024-10-29 ENCOUNTER — OFFICE VISIT (OUTPATIENT)
Dept: DERMATOLOGY | Facility: CLINIC | Age: 59
End: 2024-10-29
Attending: PHYSICIAN ASSISTANT
Payer: COMMERCIAL

## 2024-10-29 DIAGNOSIS — L21.9 SEBORRHEIC DERMATITIS: ICD-10-CM

## 2024-10-29 DIAGNOSIS — L82.1 SEBORRHEIC KERATOSES: Primary | ICD-10-CM

## 2024-10-29 DIAGNOSIS — L50.3 DERMATOGRAPHISM: ICD-10-CM

## 2024-10-29 DIAGNOSIS — Z12.83 SKIN CANCER SCREENING: ICD-10-CM

## 2024-10-29 DIAGNOSIS — D18.01 CHERRY ANGIOMA: ICD-10-CM

## 2024-10-29 DIAGNOSIS — Z98.890 POSTOPERATIVE SCAR: ICD-10-CM

## 2024-10-29 DIAGNOSIS — D22.9 MULTIPLE BENIGN NEVI: ICD-10-CM

## 2024-10-29 DIAGNOSIS — L90.5 POSTOPERATIVE SCAR: ICD-10-CM

## 2024-10-29 DIAGNOSIS — L30.4 INTERTRIGO: ICD-10-CM

## 2024-10-29 DIAGNOSIS — L81.4 LENTIGINES: ICD-10-CM

## 2024-10-29 DIAGNOSIS — Z12.83 ENCOUNTER FOR SCREENING FOR MALIGNANT NEOPLASM OF SKIN: ICD-10-CM

## 2024-10-29 DIAGNOSIS — Z86.006 HISTORY OF MELANOMA IN SITU: ICD-10-CM

## 2024-10-29 PROCEDURE — 99214 OFFICE O/P EST MOD 30 MIN: CPT | Performed by: PHYSICIAN ASSISTANT

## 2024-10-29 RX ORDER — KETOCONAZOLE 20 MG/ML
SHAMPOO, SUSPENSION TOPICAL DAILY PRN
Qty: 120 ML | Refills: 11 | Status: SHIPPED | OUTPATIENT
Start: 2024-10-29

## 2024-10-29 NOTE — PATIENT INSTRUCTIONS
Seborrheic dermatitis regimen    Maintenance - keep symptoms away!  - Begin ketoconazole 2% shampoo at least three times weekly on the scalp and face. Lather in the shower, wait 5-10 minutes before rinsing.  May use this in jonah of the ketoconazole cream, or, in conjunction with the cream. Safe to use both at the same time!    Flares - knock the itching and scaling down!  - Fluocinonide 0.05% solution BID PRN to affected areas on the scalp. Best to use this medication sparingly, but okay to hit it hard when itching and flaking.  - Ketoconazole cream mixed 1:1 with triamcinolone steroid cream: apply twice daily to the face when flared. Again, try to use the steroid sparingly, but if you need it - use it until symptoms clear up. Then resume the ketoconazole shampoo and/or ketoconazole cream.       Itching/bumps on the neck  - Okay to use betamethasone as needed. Do not exceed using more than 14 days (2 weeks) at a time.         Checking for Skin Cancer  You can help find cancer early by checking your skin each month. There are 3 main kinds of skin cancer: melanoma, basal cell carcinoma, and squamous cell carcinoma. Doing monthly skin checks is the best way to find new marks, sores, or skin changes. Follow these instructions for checking your skin.   The ABCDEs of checking moles for melanoma   Check your moles or growths for signs of melanoma using ABCDE:   Asymmetry: The sides of the mole or growth don t match.  Border: The edges are ragged, notched, or blurred.  Color: The color within the mole or growth varies. It could be black, brown, tan, white, or shades of red, gray, or blue.  Diameter: The mole or growth is larger than   inch or 6 mm (size of a pencil eraser).  Evolving: The size, shape, texture, or color of the mole or growth is changing.     ABCDE's of moles on light skin.        ABCDE's of moles on dark skin may be harder to identify.     Checking for other types of skin cancer  Basal cell carcinoma or  squamous cell carcinoma cause symptoms like:     A spot or mole that looks different from all other marks on your skin  Changes in how an area feels, such as itching, tenderness, or pain  Changes in the skin's surface, such as oozing, bleeding, or scaliness  A sore that doesn't heal  New swelling, redness, or spread of color beyond the border of a mole    Who s at risk?  Anyone of any skin color can get skin cancer. But you're at greater risk if you have:   Fair skin that freckles easily and burns instead of tanning  Light-colored or red hair  Light-colored eyes  Many moles or abnormal moles on your skin  A long history of unprotected exposure to sunlight or tanning beds  A history of many blistering sunburns as a child or teen  A family history of skin cancer  Been exposed to radiation or chemicals  A weakened immune system  Been exposed to arsenic  If you've had skin cancer in the past, you're at high risk of having it again.   How to check your skin  Do your monthly skin checkups in front of a full-length mirror. Use a room with good lighting so it's easier to see. Use a hand mirror to look at hard-to-see places like your buttocks and back. You can also have a trusted friend or family member help you with these checks. Check every part of your body, including your:   Head (ears, face, neck, and scalp)  Torso (front, back, sides, and under breasts)  Arms (tops, undersides, and armpits)  Hands (palms, backs, and fingers, including under the nails)  Lower back, buttocks, and genitals  Legs (front, back, and sides)  Feet (tops, soles, toes, including under the nails, and between toes)  Watch for new spots on your skin or a spot that's changing in color, shape, size.   If you have a lot of moles, take digital photos of them each month. Make sure to take photos both up close and from a distance. These can help you see if any moles change over time.   Know your skin  Most skin changes aren't cancer. But if you see any  changes in your skin, call your healthcare provider right away. Only they can tell you if a change is a problem. If you have skin cancer, seeing your provider can be the first step to getting the treatment that could save your life.   Stephen last reviewed this educational content on 10/1/2021    7777-4083 [employer]. All rights reserved. This information is not intended as a substitute for professional medical care. Always follow your healthcare professional's instructions.

## 2024-10-29 NOTE — PROGRESS NOTES
[facility] Dermatology Note  Encounter Date: Oct 29, 2024  Office Visit     Reviewed patients past medical history and pertinent chart review prior to patients visit today.     Dermatology Problem List:  Last FBSE - 10/29/2024  #PHx melanoma in situ  -       Family history of skin cancer (***)     SHx: ***  ____________________________________________    Assessment & Plan:     ***    # Multiple nevi, trunk and extremities  # Solar lentigines  - Discussed importance of self exams, ABCDE criteria and importance of photoprotection reviewed.     # Cherry angiomas  # Seborrheic keratoses  - Reassured benign, no treatment required.     Follow-up:  *** for follow up full body skin exam, as needed for new or changing lesions or new concerns    All risks, benefits and alternatives were discussed with patient.  Patient is in agreement and understands the assessment and plan.  All questions were answered.    STAFF:  Handy Escobedo PA-C  [facility]        ____________________________________________    CC: Skin Check (FBSE)    HPI:  Ms. Kristen Bernstein is a(n) 59 year old female who presents today as a return patient for a full body skin cancer screening.     MIS removal in May 2024, followed up with Gagan beginning of September to check her scar. Pt happy with result. Having IPL laser done 11/6/24 to address redness.      No spots of concern today.     Pt reports utilizing with photoprotection     Physical Exam:  Vitals: LMP 05/15/2015 (Exact Date)   SKIN:  {kkSkinExam:[id number]}  [place]ell type ***  ***  - Trunk/extremities: 1-2 mm red dome shaped symmetric papules, consistent with cherry angiomas.   - Trunk/extremities: tan/brown macules and papules, consistent with benign nevi. No concerning features on dermoscopy.   - Sun exposed skin: tan, homogenous macules, consistent with solar lentigines.   - Trunk/extremities: waxy, stuck on appearing papules and patches, consistent with  seborrheic keratoses.    - No other lesions of concern on areas examined.     Medications:  Current Outpatient Medications   Medication Sig Dispense Refill    fluocinonide (LIDEX) 0.05 % external solution Apply topically 2 times daily To affected areas on the scalp as needed 60 mL 3    ketoconazole (NIZORAL) 2 % external cream Apply topically 2 times daily To affected area on right eyebrow or folded areas until clear. Mix with equal parts triam 0.025% 15 g 1    losartan (COZAAR) 100 MG tablet TAKE ONE TABLET BY MOUTH ONCE DAILY 90 tablet 3    triamcinolone (KENALOG) 0.025 % cream Apply topically 2 times daily To affected area on the right eyebrow or folded areas, until clear. Mix with equal parts ketoconazole 15 g 1     No current facility-administered medications for this visit.      Past Medical History:   Patient Active Problem List   Diagnosis    Hypertension goal BP (blood pressure) < 140/90    Family history of colon cancer    GERD (gastroesophageal reflux disease)    CARDIOVASCULAR SCREENING; LDL GOAL LESS THAN 130    History of melanoma in situ - atypical melanocytic proliferation best treated as an early evolving melanoma in situ     Past Medical History:   Diagnosis Date    GERD     Malignant melanoma (H)     Rash and other nonspecific skin eruption (aka PRURITIS)     Unspecified essential hypertension        CC Referred Self, MD  No address on file on close of this encounter.

## 2024-10-29 NOTE — LETTER
10/29/2024      Kristen Bernstein  [address] [place] [address]      Dear Colleague,    Thank you for referring your patient, Kristen Bernstein, to the [facility]. Please see a copy of my visit note below.    [facility] Dermatology Note  Encounter Date: Oct 29, 2024  Office Visit     Dermatology Problem List:  Last FBSE 10/29/2024, recommend every 6 months  1. History of melanoma  - R lower cheek (monitor photos 4/18/2023; stable 10/17/2023), s/p partial shave bx 04/30/2024 revealed MIS s/p Mohs w/ WLE by Dr. Farah 5/3/24 clear margins  - Early melanocytic proliferation (treated as MIS), L medial distal thigh s/p WLE 1/21/2019  2. History of dysplastic nevus  - Mild atypia, L leg s/p shave biopsy 1/3/2019  3. Seborrheic dermatitis  - Current tx: fluocinonide 0.05% solution, ketoconazole 2% cream, triamcinolone 0.025% cream  - Previous tx: ketoconazole 2% shampoo, betamethasone 0.05% cream  4. Solar lentigo, L lower leg s/p shave biopsy 4/12/2022  5. 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    ____________________________________________    Assessment & Plan:    # Seborrheic dermatitis. Chronic. Improved, but still flares occassionally.  - Begin ketoconazole 2% shampoo at least three times weekly. Lather in the shower, wait 5-10 minutes before rinsing.  This is your maintenance medication.   - Continue fluocinonide 0.05% solution BID PRN to affected areas on the scalp. Try to use sparingly for flares.   - Continue ketoconazole 2% cream mixed 1:1 triamcinolone 0.025% cream BID PRN on the face prn.     # History of melanoma. No evidence of recurrent disease.  # History of dysplastic nevus.  - ABCDEs of melanoma advised.  - Continue photoprotection.  - Continue skin exams twice yearly.  - Advised to monitor for changing, non-healing, bleeding, painful, changing, or otherwise symptomatic  lesions.     # Skin cancer screening with multiple benign nevi, solar lentigines  - ABCDEs: Counseled ABCDEs of melanoma: Asymmetry, Border (irregularity), Color (not uniform, changes in color), Diameter (greater than 6 mm which is about the size of a pencil eraser), and Evolving (any changes in preexisting moles).  - Sun protection: Counseled SPF30+ sunscreen, UPF clothing, sun avoidance, tanning bed avoidance.    # Cherry angiomas and seborrheic keratoses,  Benign, reassurance given.     # Dermatographism. Resolved.  - Consider OTC Antihistamines daily if symptoms arise    # Intertrigo. Improved, stable.   - Okay to use betamethasone lotion when flared. Do not use for longer than 2 weeks at a time. MyChart for refill when needed.     Follow-up: 6 month(s) in-person, or earlier for new or changing lesions    Staff and Scribe:   Scribe Disclosure:  I, Constance Bernal serving as a scribe to document services personally performed by  Saskia Oliveros PA-C , based on data collection and the provider's statements to me.    [facility]  Provider Disclosure:   The documentation recorded by the scribe accurately reflects the services I personally performed and the decisions made by me.    All risks, benefits and alternatives were discussed with patient.  Patient is in agreement and understands the assessment and plan.  All questions were answered.  Sun Screen Education was given.   Return to Clinic in 6 months or sooner as needed.   Saskia Oliveros PA-C   [facility]          ____________________________________________    CC: Skin Check (FBSE)    HPI:  Ms. Kristen Bernstein is a(n) 59 year old female who presents today as a return patient for a skin check. Last seen by me 04/30/2023.    Pt had MIS removal with Dr. Farah on R cheek and is happy with the result. She had a scar re-check with him September 2024 and he is also pleased. On 11/6/24 she is having IPL laser  with him to address redness around the scar. No spots of concern today.     Seb Derm is stable, uses ketoconazole shampoo 3x per week which controls symptoms. Has not needed fluocinolone.     Intertrigo is stable, had not needed ketoconazole or triamcinolone. Gets some heat rash on the back on the neck, for which she uses betamethasone, which helps.     Dermatographism has not been an issue, never initated otc antihistamine.     Patient is otherwise feeling well, without additional skin concerns.    Labs Reviewed:  5/30/24 pathology - margins clear on WLE of MIS R cheek    Physical exam:  Vitals: LMP 05/15/2015 (Exact Date)   GEN: This is a well developed, well-nourished female in no acute distress, in a pleasant mood.    SKIN: Full skin, which includes the head/face, both arms, chest, back, abdomen,both legs, genitalia and/or groin buttocks, digits and/or nails, was examined.    - R lower cheek: 6.5 well-healed linear, flat surgical scar with surrounding erythema  -LYMPH:  There is no lymphadenopathy on palpation of cervical, post auricular, occipital, axillary, inguinal, and popliteal nodes.  - Face: no scaly plaques  - L neck: no evidence of pink linear wheels  - There are dome shaped bright red papules on the head/neck, trunk, extremities.   - Multiple regular brown pigmented macules and papules are identified on the head/neck, trunk, extremities.   - Scattered brown macules on sun exposed areas.  - There are waxy stuck on tan to brown papules on the head/neck, trunk, extremities.  - Negative dermatographism  - No other lesions of concern on areas examined.           Medications:  Current Outpatient Medications   Medication Sig Dispense Refill     fluocinonide (LIDEX) 0.05 % external solution Apply topically 2 times daily To affected areas on the scalp as needed 60 mL 3     ketoconazole (NIZORAL) 2 % external cream Apply topically 2 times daily To affected area on right eyebrow or folded areas until clear. Mix  with equal parts triam 0.025% 15 g 1     losartan (COZAAR) 100 MG tablet TAKE ONE TABLET BY MOUTH ONCE DAILY 90 tablet 3     triamcinolone (KENALOG) 0.025 % cream Apply topically 2 times daily To affected area on the right eyebrow or folded areas, until clear. Mix with equal parts ketoconazole 15 g 1     No current facility-administered medications for this visit.      Past Medical History:   Patient Active Problem List   Diagnosis     Hypertension goal BP (blood pressure) < 140/90     Family history of colon cancer     GERD (gastroesophageal reflux disease)     CARDIOVASCULAR SCREENING; LDL GOAL LESS THAN 130     History of melanoma in situ - atypical melanocytic proliferation best treated as an early evolving melanoma in situ     Past Medical History:   Diagnosis Date     GERD      Malignant melanoma (H)      Rash and other nonspecific skin eruption (aka PRURITIS)      Unspecified essential hypertension         CC No referring provider defined for this encounter. on close of this encounter.      Again, thank you for allowing me to participate in the care of your patient.        Sincerely,        Saskia Oliveros PA-C

## 2024-10-29 NOTE — PROGRESS NOTES
[facility] Dermatology Note  Encounter Date: Oct 29, 2024  Office Visit     Dermatology Problem List:  Last FBSE 10/29/2024, recommend every 6 months  1. History of melanoma  - R lower cheek (monitor photos 4/18/2023; stable 10/17/2023), s/p partial shave bx 04/30/2024 revealed MIS s/p Mohs w/ WLE by Dr. Farah 5/3/24 clear margins  - Early melanocytic proliferation (treated as MIS), L medial distal thigh s/p WLE 1/21/2019  2. History of dysplastic nevus  - Mild atypia, L leg s/p shave biopsy 1/3/2019  3. Seborrheic dermatitis  - Current tx: fluocinonide 0.05% solution, ketoconazole 2% cream, triamcinolone 0.025% cream  - Previous tx: ketoconazole 2% shampoo, betamethasone 0.05% cream  4. Solar lentigo, L lower leg s/p shave biopsy 4/12/2022  5. 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    ____________________________________________    Assessment & Plan:    # Seborrheic dermatitis. Chronic. Improved, but still flares occassionally.  - Begin ketoconazole 2% shampoo at least three times weekly. Lather in the shower, wait 5-10 minutes before rinsing.  This is your maintenance medication.   - Continue fluocinonide 0.05% solution BID PRN to affected areas on the scalp. Try to use sparingly for flares.   - Continue ketoconazole 2% cream mixed 1:1 triamcinolone 0.025% cream BID PRN on the face prn.     # History of melanoma. No evidence of recurrent disease.  # History of dysplastic nevus.  - ABCDEs of melanoma advised.  - Continue photoprotection.  - Continue skin exams twice yearly.  - Advised to monitor for changing, non-healing, bleeding, painful, changing, or otherwise symptomatic lesions.     # Skin cancer screening with multiple benign nevi, solar lentigines  - ABCDEs: Counseled ABCDEs of melanoma: Asymmetry, Border (irregularity), Color (not uniform, changes in color), Diameter (greater than 6 mm which is about the size  of a pencil eraser), and Evolving (any changes in preexisting moles).  - Sun protection: Counseled SPF30+ sunscreen, UPF clothing, sun avoidance, tanning bed avoidance.    # Cherry angiomas and seborrheic keratoses,  Benign, reassurance given.     # Dermatographism. Resolved.  - Consider OTC Antihistamines daily if symptoms arise    # Intertrigo. Improved, stable.   - Okay to use betamethasone lotion when flared. Do not use for longer than 2 weeks at a time. MyChart for refill when needed.     Follow-up: 6 month(s) in-person, or earlier for new or changing lesions    Staff and Scribe:   Scribe Disclosure:  I, Constance Bernal serving as a scribe to document services personally performed by  Saskia Oliveros PA-C , based on data collection and the provider's statements to me.    [facility] Dermatology  Provider Disclosure:   The documentation recorded by the scribe accurately reflects the services I personally performed and the decisions made by me.    All risks, benefits and alternatives were discussed with patient.  Patient is in agreement and understands the assessment and plan.  All questions were answered.  Sun Screen Education was given.   Return to Clinic in 6 months or sooner as needed.   Saskia Oliveros PA-C   [facility]          ____________________________________________    CC: Skin Check (FBSE)    HPI:  Ms. Kristen Bernstein is a(n) 59 year old female who presents today as a return patient for a skin check. Last seen by me 04/30/2023.    Pt had MIS removal with Dr. Farah on R cheek and is happy with the result. She had a scar re-check with him September 2024 and he is also pleased. On 11/6/24 she is having IPL laser with him to address redness around the scar. No spots of concern today.     Seb Derm is stable, uses ketoconazole shampoo 3x per week which controls symptoms. Has not needed fluocinolone.     Intertrigo is stable, had not needed ketoconazole or  triamcinolone. Gets some heat rash on the back on the neck, for which she uses betamethasone, which helps.     Dermatographism has not been an issue, never initated otc antihistamine.     Patient is otherwise feeling well, without additional skin concerns.    Labs Reviewed:  5/30/24 pathology - margins clear on WLE of MIS R cheek    Physical exam:  Vitals: LMP 05/15/2015 (Exact Date)   GEN: This is a well developed, well-nourished female in no acute distress, in a pleasant mood.    SKIN: Full skin, which includes the head/face, both arms, chest, back, abdomen,both legs, genitalia and/or groin buttocks, digits and/or nails, was examined.    - R lower cheek: 6.5 well-healed linear, flat surgical scar with surrounding erythema  -LYMPH:  There is no lymphadenopathy on palpation of cervical, post auricular, occipital, axillary, inguinal, and popliteal nodes.  - Face: no scaly plaques  - L neck: no evidence of pink linear wheels  - There are dome shaped bright red papules on the head/neck, trunk, extremities.   - Multiple regular brown pigmented macules and papules are identified on the head/neck, trunk, extremities.   - Scattered brown macules on sun exposed areas.  - There are waxy stuck on tan to brown papules on the head/neck, trunk, extremities.  - Negative dermatographism  - No other lesions of concern on areas examined.           Medications:  Current Outpatient Medications   Medication Sig Dispense Refill    fluocinonide (LIDEX) 0.05 % external solution Apply topically 2 times daily To affected areas on the scalp as needed 60 mL 3    ketoconazole (NIZORAL) 2 % external cream Apply topically 2 times daily To affected area on right eyebrow or folded areas until clear. Mix with equal parts triam 0.025% 15 g 1    losartan (COZAAR) 100 MG tablet TAKE ONE TABLET BY MOUTH ONCE DAILY 90 tablet 3    triamcinolone (KENALOG) 0.025 % cream Apply topically 2 times daily To affected area on the right eyebrow or folded areas,  until clear. Mix with equal parts ketoconazole 15 g 1     No current facility-administered medications for this visit.      Past Medical History:   Patient Active Problem List   Diagnosis    Hypertension goal BP (blood pressure) < 140/90    Family history of colon cancer    GERD (gastroesophageal reflux disease)    CARDIOVASCULAR SCREENING; LDL GOAL LESS THAN 130    History of melanoma in situ - atypical melanocytic proliferation best treated as an early evolving melanoma in situ     Past Medical History:   Diagnosis Date    GERD     Malignant melanoma (H)     Rash and other nonspecific skin eruption (aka PRURITIS)     Unspecified essential hypertension         CC No referring provider defined for this encounter. on close of this encounter.

## 2024-11-06 ENCOUNTER — OFFICE VISIT (OUTPATIENT)
Dept: DERMATOLOGY | Facility: CLINIC | Age: 59
End: 2024-11-06
Payer: COMMERCIAL

## 2024-11-06 DIAGNOSIS — Z85.828 HISTORY OF SKIN CANCER: Primary | ICD-10-CM

## 2024-11-06 PROCEDURE — 99207 PR NO CHARGE LOS: CPT | Performed by: DERMATOLOGY

## 2024-11-06 ASSESSMENT — PAIN SCALES - GENERAL: PAINLEVEL_OUTOF10: NO PAIN (0)

## 2024-11-06 NOTE — LETTER
11/6/2024      Kristen Bernstein  [address] [place] [address]      Dear Colleague,    Thank you for referring your patient, Kristen Bernstein, to the [facility]. Please see a copy of my visit note below.    Kristen Bernstein is an extremely pleasant 59 year old year old female patient here today for Ipl of surgical scar.  Patient has no other skin complaints today.  Remainder of the HPI, Meds, PMH, Allergies, FH, and SH was reviewed in chart.      Past Medical History:   Diagnosis Date     GERD      Malignant melanoma (H)      Rash and other nonspecific skin eruption (aka PRURITIS)      Unspecified essential hypertension        Past Surgical History:   Procedure Laterality Date     COLONOSCOPY  10/12/2012    Procedure: COLONOSCOPY;  Colonoscopy, screening;  Surgeon: Kristen Alexander MD;  Location: [facility] OR     COLONOSCOPY N/A 1/22/2018    Procedure: COMBINED COLONOSCOPY, SINGLE OR MULTIPLE BIOPSY/POLYPECTOMY BY BIOPSY;;  Surgeon: Priscilla You MD;  Location:  GI     [facility]       [facility] COLONOSCOPY THRU STOMA, DIAGNOSTIC  2007    polyps removed.         Family History   Problem Relation Age of Onset     Breast Cancer Mother 78     Osteoporosis Mother      Diabetes Father      Hypertension Father      Colon Cancer Father      Hypertension Maternal Grandmother      Heart Failure Maternal Grandmother      Diabetes Maternal Uncle      Lung Cancer Maternal Uncle        Social History     Socioeconomic History     Marital status:      Spouse name: Not on file     Number of children: Not on file     Years of education: Not on file     Highest education level: Not on file   Occupational History     Occupation: [employer]     Employer: NONE    Tobacco Use     Smoking status: Never     Passive exposure: Never     Smokeless tobacco: Never   Vaping Use     Vaping status: Never Used   Substance and Sexual Activity      Alcohol use: Yes     Alcohol/week: 0.0 standard drinks of alcohol     Comment: 1/week     Drug use: No     Sexual activity: Yes     Partners: Male   Other Topics Concern     Parent/sibling w/ CABG, MI or angioplasty before 65F 55M? No      Service No     Blood Transfusions No     Caffeine Concern No     Occupational Exposure No     Hobby Hazards No     Sleep Concern No     Stress Concern Yes     Comment: Related to life issues     Weight Concern No     Special Diet No     Back Care No     Exercise No     Bike Helmet Yes     Seat Belt Yes     Self-Exams No   Social History Narrative     Not on file     Social Drivers of Health     Financial Resource Strain: Low Risk  (6/4/2024)    Financial Resource Strain      Within the past 12 months, have you or your family members you live with been unable to get utilities (heat, electricity) when it was really needed?: No   Food Insecurity: Low Risk  (6/4/2024)    Food Insecurity      Within the past 12 months, did you worry that your food would run out before you got money to buy more?: No      Within the past 12 months, did the food you bought just not last and you didn t have money to get more?: No   Transportation Needs: Low Risk  (6/4/2024)    Transportation Needs      Within the past 12 months, has lack of transportation kept you from medical appointments, getting your medicines, non-medical meetings or appointments, work, or from getting things that you need?: No   Physical Activity: Unknown (6/4/2024)    Exercise Vital Sign      Days of Exercise per Week: 0 days      Minutes of Exercise per Session: Not on file   Stress: No Stress Concern Present (6/4/2024)    [employer] - Occupational Stress Questionnaire      Feeling of Stress : Not at all   Social Connections: Unknown (6/4/2024)    Social Connection and Isolation Panel [NHANES]      Frequency of Communication with Friends and Family: Not on file      Frequency of Social  Gatherings with Friends and Family: More than three times a week      Attends Gnosticist Services: Not on file      Active Member of Clubs or Organizations: Not on file      Attends Club or Organization Meetings: Not on file      Marital Status: Not on file   Interpersonal Safety: Low Risk  (6/4/2024)    Interpersonal Safety      Do you feel physically and emotionally safe where you currently live?: Yes      Within the past 12 months, have you been hit, slapped, kicked or otherwise physically hurt by someone?: No      Within the past 12 months, have you been humiliated or emotionally abused in other ways by your partner or ex-partner?: No   Housing Stability: Low Risk  (6/4/2024)    Housing Stability      Do you have housing? : Yes      Are you worried about losing your housing?: No       Outpatient Encounter Medications as of 11/6/2024   Medication Sig Dispense Refill     fluocinonide (LIDEX) 0.05 % external solution Apply topically 2 times daily To affected areas on the scalp as needed 60 mL 3     ketoconazole (NIZORAL) 2 % external cream Apply topically 2 times daily To affected area on right eyebrow or folded areas until clear. Mix with equal parts triam 0.025% 15 g 1     ketoconazole (NIZORAL) 2 % external shampoo Apply topically daily as needed for itching or irritation. Leave on for 3-5 min before rinsing. 120 mL 11     losartan (COZAAR) 100 MG tablet TAKE ONE TABLET BY MOUTH ONCE DAILY 90 tablet 3     triamcinolone (KENALOG) 0.025 % cream Apply topically 2 times daily To affected area on the right eyebrow or folded areas, until clear. Mix with equal parts ketoconazole 15 g 1     No facility-administered encounter medications on file as of 11/6/2024.             O:   NAD, WDWN, Alert & Oriented, Mood & Affect wnl, Vitals stable   General appearance normal   Vitals stable   Alert, oriented and in no acute distress   R cheek light brown macules and red spotsi n scar      Eyes: Conjunctivae/lids:Normal     ENT:  Lips, mucosa: normal    MSK:Normal    Cardiovascular: peripheral edema none    Pulm: Breathing Normal    Neuro/Psych: Orientation:Alert and Orientedx3 ; Mood/Affect:normal       A/P:  Hx of skin cancer  No charge  After eye protection had been placed, analgesia obtained with cooling, . The ipl used with 515 and 560 nm to treat cheek.  An excellent clinical response was obtained and the patient confirmed that all sites had been treated.  The patient was given wound care instructions and will return in  2 months.  It was a pleasure speaking to Kristen Bernstein today.      Again, thank you for allowing me to participate in the care of your patient.        Sincerely,        Neil Farah MD

## 2024-11-06 NOTE — PATIENT INSTRUCTIONS
You have been treated with IPL. The treated area is very delicate and should be treated gently. Many patients develop immediate redness and swelling of the treated area which feels like a sunburn. Occasionally, blisters and crusts may form. Please read and follow these instructions.     Quick warm showers are recommended. If areas are treated other than the facial area, hot baths are not advised for 24 hours.    Cold compresses (ice packs) should be applied immediately after treatment and may reduce postoperative pain and minimize swelling.    Wound care: wash the treated area with a mild soap twice daily. Emollients such as aquaphor, Aloe Vera gel or petrolatum ointments can be soothing when applied 2-3 times daily until healing is complete. If blistering occurs, a topical antibiotic ointment applied twice daily is helpful. A bandage is not required, however, applying a non-stick bandage or band-aid may help protect a particular area from being irritated by clothing or jewelry.    If crusts or scabs develop, be careful not to scratch or pick at the treated area. Allow them to fall off on their own.    Analgesics such as acetaminophen or ibuprofen may be taken if necessary to reduce discomfort. Additional application of ice or cool compresses in the first 24-36 hours may also be helpful.    Make-up may be applied on the next day unless blistering or crusts developed. Crusts usually disappear within 1 week. A good mineral, non-comedogenic makeup such as Amanda Iredale is recommended.    Any degree of suntan will make the laser treatment less effective and increase your chance of having adverse effects such as blisters and scarring. It is absolutely necessary that you protect the area with a sunscreen that blocks UVA and UVB rays (SPF 15-30) when going outdoors (especially if you require future treatments). This should be done 4 weeks before and after treatment.

## 2024-11-06 NOTE — NURSING NOTE
Kristen Bernstein's chief complaint for this visit includes:  Chief Complaint   Patient presents with    Laser Treatment     Post mohs     PCP: Evelin Soto    Referring Provider:  Referred Self, MD  No address on file    LMP 05/15/2015 (Exact Date)   No Pain (0)      No Known Allergies      Do you need any medication refills at today's visit? No    Nasrin Raya MA

## 2024-11-06 NOTE — PROGRESS NOTES
Kristen Bernstein is an extremely pleasant 59 year old year old female patient here today for Ipl of surgical scar.  Patient has no other skin complaints today.  Remainder of the HPI, Meds, PMH, Allergies, FH, and SH was reviewed in chart.      Past Medical History:   Diagnosis Date    GERD     Malignant melanoma (H)     Rash and other nonspecific skin eruption (aka PRURITIS)     Unspecified essential hypertension        Past Surgical History:   Procedure Laterality Date    COLONOSCOPY  10/12/2012    Procedure: COLONOSCOPY;  Colonoscopy, screening;  Surgeon: Kristen Alexander MD;  Location: [facility] OR    COLONOSCOPY N/A 1/22/2018    Procedure: COMBINED COLONOSCOPY, SINGLE OR MULTIPLE BIOPSY/POLYPECTOMY BY BIOPSY;;  Surgeon: Priscilla You MD;  Location:  GI    UPPER GI ENDOSCOPY  3/2006    [facility] C-SEC ONLY,PREV C-SEC      [facility] COLONOSCOPY THRU STOMA, DIAGNOSTIC  2007    polyps removed.         Family History   Problem Relation Age of Onset    Breast Cancer Mother 78    Osteoporosis Mother     Diabetes Father     Hypertension Father     Colon Cancer Father     Hypertension Maternal Grandmother     Heart Failure Maternal Grandmother     Diabetes Maternal Uncle     Lung Cancer Maternal Uncle        Social History     Socioeconomic History    Marital status:      Spouse name: Not on file    Number of children: Not on file    Years of education: Not on file    Highest education level: Not on file   Occupational History    Occupation: [employer]     Employer: NONE    Tobacco Use    Smoking status: Never     Passive exposure: Never    Smokeless tobacco: Never   Vaping Use    Vaping status: Never Used   Substance and Sexual Activity    Alcohol use: Yes     Alcohol/week: 0.0 standard drinks of alcohol     Comment: 1/week    Drug use: No    Sexual activity: Yes     Partners: Male   Other Topics Concern    Parent/sibling w/ CABG, MI or angioplasty before 65F 55M? No     Service No    Blood  Transfusions No    Caffeine Concern No    Occupational Exposure No    Hobby Hazards No    Sleep Concern No    Stress Concern Yes     Comment: Related to life issues    Weight Concern No    Special Diet No    Back Care No    Exercise No    Bike Helmet Yes    Seat Belt Yes    Self-Exams No   Social History Narrative    Not on file     Social Drivers of Health     Financial Resource Strain: Low Risk  (6/4/2024)    Financial Resource Strain     Within the past 12 months, have you or your family members you live with been unable to get utilities (heat, electricity) when it was really needed?: No   Food Insecurity: Low Risk  (6/4/2024)    Food Insecurity     Within the past 12 months, did you worry that your food would run out before you got money to buy more?: No     Within the past 12 months, did the food you bought just not last and you didn t have money to get more?: No   Transportation Needs: Low Risk  (6/4/2024)    Transportation Needs     Within the past 12 months, has lack of transportation kept you from medical appointments, getting your medicines, non-medical meetings or appointments, work, or from getting things that you need?: No   Physical Activity: Unknown (6/4/2024)    Exercise Vital Sign     Days of Exercise per Week: 0 days     Minutes of Exercise per Session: Not on file   Stress: No Stress Concern Present (6/4/2024)    [employer] - Occupational Stress Questionnaire     Feeling of Stress : Not at all   Social Connections: Unknown (6/4/2024)    Social Connection and Isolation Panel [NHANES]     Frequency of Communication with Friends and Family: Not on file     Frequency of Social Gatherings with Friends and Family: More than three times a week     Attends Orthodoxy Services: Not on file     Active Member of Clubs or Organizations: Not on file     Attends Club or Organization Meetings: Not on file     Marital Status: Not on file   Interpersonal Safety: Low Risk  (6/4/2024)     Interpersonal Safety     Do you feel physically and emotionally safe where you currently live?: Yes     Within the past 12 months, have you been hit, slapped, kicked or otherwise physically hurt by someone?: No     Within the past 12 months, have you been humiliated or emotionally abused in other ways by your partner or ex-partner?: No   Housing Stability: Low Risk  (6/4/2024)    Housing Stability     Do you have housing? : Yes     Are you worried about losing your housing?: No       Outpatient Encounter Medications as of 11/6/2024   Medication Sig Dispense Refill    fluocinonide (LIDEX) 0.05 % external solution Apply topically 2 times daily To affected areas on the scalp as needed 60 mL 3    ketoconazole (NIZORAL) 2 % external cream Apply topically 2 times daily To affected area on right eyebrow or folded areas until clear. Mix with equal parts triam 0.025% 15 g 1    ketoconazole (NIZORAL) 2 % external shampoo Apply topically daily as needed for itching or irritation. Leave on for 3-5 min before rinsing. 120 mL 11    losartan (COZAAR) 100 MG tablet TAKE ONE TABLET BY MOUTH ONCE DAILY 90 tablet 3    triamcinolone (KENALOG) 0.025 % cream Apply topically 2 times daily To affected area on the right eyebrow or folded areas, until clear. Mix with equal parts ketoconazole 15 g 1     No facility-administered encounter medications on file as of 11/6/2024.             O:   NAD, WDWN, Alert & Oriented, Mood & Affect wnl, Vitals stable   General appearance normal   Vitals stable   Alert, oriented and in no acute distress   R cheek light brown macules and red spotsi n scar      Eyes: Conjunctivae/lids:Normal     ENT: Lips, mucosa: normal    MSK:Normal    Cardiovascular: peripheral edema none    Pulm: Breathing Normal    Neuro/Psych: Orientation:Alert and Orientedx3 ; Mood/Affect:normal       A/P:  Hx of skin cancer  No charge  After eye protection had been placed, analgesia obtained with cooling, . The ipl used with 515 and 560  nm to treat cheek.  An excellent clinical response was obtained and the patient confirmed that all sites had been treated.  The patient was given wound care instructions and will return in  2 months.  It was a pleasure speaking to Kristen Bernstein today.

## 2024-12-08 ENCOUNTER — NURSE TRIAGE (OUTPATIENT)
Dept: FAMILY MEDICINE | Facility: CLINIC | Age: 59
End: 2024-12-08
Payer: COMMERCIAL

## 2024-12-08 NOTE — TELEPHONE ENCOUNTER
Reason for Call:  Appointment Request    Patient requesting this type of appt:  Right knee swelling     Requested provider: Evelin Soto    Reason patient unable to be scheduled: Not within requested timeframe    When does patient want to be seen/preferred time: 1-2 days    Comments: Patient says that her right knee swelling for a week and would like to be seen soon    Could we send this information to you in [facility] or would you prefer to receive a phone call?:   Patient would prefer a phone call   Okay to leave a detailed message?: Yes at Cell number on file:    Telephone Information:   Mobile [phone]       Call taken on 12/8/2024 at 11:58 AM by Hafsa Alves

## 2024-12-09 NOTE — TELEPHONE ENCOUNTER
"Pt agreed to Disposition - to be seen within next 3 DAYS - and assisted with scheduling the following:    Dec 11, 2024 1:00 PM  (Arrive by 12:40 PM)  Provider Visit with Evelin Soto MD  [facility] ([facility] ) [phone]     Sara Garcia, RN  ~~~~~~~~~~~~~~~~~~~~~~~~~    1. LOCATION: \"Where is the swelling located?\"  (e.g., left, right, both knees)      Outside of right knee swelling  2. ONSET: \"When did the swelling start?\" \"Does it come and go, or is it there all the time?\"      2 weeks ago  3. SWELLING: \"How bad is the swelling?\" Or, \"How large is it?\" (e.g., mild, moderate, severe; size of localized swelling)       More swelling on the lateral side of right knee  4. PAIN: \"Is there any pain?\" If Yes, ask: \"How bad is it?\" (Scale 1-10; or mild, moderate, severe)      No pain - more stiff, tight at the back of knee  5. SETTING: \"Has there been any recent work, exercise or other activity that involved that part of the body?\"       Denies  6. AGGRAVATING FACTORS: \"What makes the knee swelling worse?\" (e.g., walking, climbing stairs, running)      Walking down stairs, feels tighter; pt feels safe walking - no hx DVT, trauma to right knee   7. ASSOCIATED SYMPTOMS: \"Is there any pain or redness?\"      Denies  8. OTHER SYMPTOMS: \"Do you have any other symptoms?\" (e.g., chest pain, difficulty breathing, fever, calf pain)      Denies  9. PREGNANCY: \"Is there any chance you are pregnant?\" \"When was your last menstrual period?\"      --    Reason for Disposition   MILD or MODERATE swelling (e.g., can't move joint normally, can't do usual activities) (Exceptions: Itchy, localized swelling; swelling is chronic.)    Additional Information   Negative: Followed a knee injury   Negative: Followed a bee sting and has localized swelling (e.g., small area of puffy or swollen skin)   Negative: Followed an insect bite and has localized swelling (e.g., small area of puffy " or swollen skin)   Negative: Knee pain is main symptom   Negative: Swelling of calf or leg is main symptom   Negative: Knee pain and fever   Negative: Knee redness and fever   Negative: Patient sounds very sick or weak to the triager   Negative: SEVERE pain (e.g., excruciating, unable to walk) and not improved after 2 hours of pain medicine   Negative: Redness and painful when touched and no fever   Negative: Red area or streak > 2 inches (or 5 cm)   Negative: Thigh or calf pain and only 1 side and present > 1 hour   Negative: Thigh or calf swelling and only 1 side   Negative: SEVERE swelling (e.g., can't move swollen knee at all)   Negative: Looks like a boil, infected sore, deep ulcer or other infected rash (spreading redness, pus)   Negative: Redness of the skin and no fever   Negative: Patient wants to be seen    Protocols used: Knee Swelling-A-OH

## 2024-12-09 NOTE — TELEPHONE ENCOUNTER
Triage Patient Outreach    Attempt # 1    Was call answered?  No.  Left voicemail to return call to [facility] at [facility]    Roxann Minor RN

## 2024-12-11 ENCOUNTER — ANCILLARY PROCEDURE (OUTPATIENT)
Dept: GENERAL RADIOLOGY | Facility: CLINIC | Age: 59
End: 2024-12-11
Attending: FAMILY MEDICINE
Payer: COMMERCIAL

## 2024-12-11 ENCOUNTER — OFFICE VISIT (OUTPATIENT)
Dept: FAMILY MEDICINE | Facility: CLINIC | Age: 59
End: 2024-12-11
Payer: COMMERCIAL

## 2024-12-11 VITALS
TEMPERATURE: 98.2 F | OXYGEN SATURATION: 97 % | SYSTOLIC BLOOD PRESSURE: 130 MMHG | WEIGHT: 179 LBS | HEART RATE: 85 BPM | DIASTOLIC BLOOD PRESSURE: 80 MMHG | RESPIRATION RATE: 17 BRPM | BODY MASS INDEX: 32.74 KG/M2

## 2024-12-11 DIAGNOSIS — M25.461 SWELLING OF RIGHT KNEE JOINT: Primary | ICD-10-CM

## 2024-12-11 DIAGNOSIS — M25.461 SWELLING OF RIGHT KNEE JOINT: ICD-10-CM

## 2024-12-11 PROCEDURE — 99213 OFFICE O/P EST LOW 20 MIN: CPT | Mod: 25 | Performed by: FAMILY MEDICINE

## 2024-12-11 PROCEDURE — 90480 ADMN SARSCOV2 VAC 1/ONLY CMP: CPT | Performed by: FAMILY MEDICINE

## 2024-12-11 PROCEDURE — 73562 X-RAY EXAM OF KNEE 3: CPT | Mod: TC | Performed by: RADIOLOGY

## 2024-12-11 PROCEDURE — 90673 RIV3 VACCINE NO PRESERV IM: CPT | Performed by: FAMILY MEDICINE

## 2024-12-11 PROCEDURE — 91320 SARSCV2 VAC 30MCG TRS-SUC IM: CPT | Performed by: FAMILY MEDICINE

## 2024-12-11 PROCEDURE — 90471 IMMUNIZATION ADMIN: CPT | Performed by: FAMILY MEDICINE

## 2024-12-11 ASSESSMENT — PAIN SCALES - GENERAL: PAINLEVEL_OUTOF10: NO PAIN (1)

## 2024-12-11 NOTE — PROGRESS NOTES
"  Assessment & Plan     Swelling of right knee joint  - XR Knee Right 3 Views; Future    X-ray of the knee reviewed.  I do not see any arthritic changes.  No narrowing of the joint space.  Questionable etiology.  Patient does not appear to be septic.  She does not have any popliteal discomfort.  She does not clinically appear to have a DVT.  Recommending to use ibuprofen and apply heat.  Observe for the next few weeks to see if things improve gradually or not.  She is able to ambulate without any discomfort.  If any changes of symptoms or any new accompanying symptoms develop, notify me back.  Patient verbalized understanding and agreement to the plan  Official report of the x-ray pending.  Await the result      BMI  Estimated body mass index is 32.74 kg/m  as calculated from the following:    Height as of 6/4/24: 1.575 m (5' 2\").    Weight as of this encounter: 81.2 kg (179 lb).             Subjective   Kristen is a 59 year old, presenting for the following health issues:  Knee Pain (Right x 2 weeks )        12/11/2024     1:02 PM   Additional Questions   Roomed by Carlos HILLS     History of Present Illness       Reason for visit:  Swollen right knee  Symptom onset:  1-2 weeks ago  Symptoms include:  Right knee feels stiff/tight - swelling around it  Symptom intensity:  Mild  Symptom progression:  Staying the same  Had these symptoms before:  No   She is taking medications regularly.         Pain History:  When did you first notice your pain? 2 weeks  - tightness and swelling   Have you seen anyone else for your pain? No  How has your pain affected your ability to work? Pain does not limit ability to work   Where in your body do you have pain? Musculoskeletal problem/pain  Onset/Duration: 2 weeks   Description  Location: knee - right  Joint Swelling: YES  Redness: No  Pain: YES  Warmth: No  Intensity:  moderate  Progression of Symptoms:  same and constant  Accompanying signs and symptoms:   Fevers: " No  Numbness/tingling/weakness: No  History  Trauma to the area: No  Recent illness:  No  Previous similar problem: No  Previous evaluation:  No  Precipitating or alleviating factors:  Aggravating factors include: climbing stairs and pressure   Therapies tried and outcome: ice          Review of Systems  CONSTITUTIONAL: NEGATIVE for fever, chills, change in weight  ENT/MOUTH: NEGATIVE for ear, mouth and throat problems  RESP: NEGATIVE for significant cough or SOB  CV: NEGATIVE for chest pain, palpitations or peripheral edema      Objective    /80   Pulse 85   Temp 98.2  F (36.8  C) (Tympanic)   Resp 17   Wt 81.2 kg (179 lb)   LMP 05/15/2015 (Exact Date)   SpO2 97%   BMI 32.74 kg/m    Body mass index is 32.74 kg/m .  Physical Exam   GENERAL: alert and no distress  RESP: lungs clear to auscultation - no rales, rhonchi or wheezes  CV: regular rate and rhythm, normal S1 S2, no S3 or S4, no murmur, click or rub, no peripheral edema  MS: Right knee swelling noted without any erythema or tenderness.  Range of motion is pretty normal.  Patient is able to bear weight without any difficulty.    Swelling is more pronounced towards the lateral side of the joint as compared to the medial side.  No erythema, ecchymosis.  No tenderness in the popliteal region.  No swelling noted in the thigh or the calf area.    Signed Electronically by: Evelin Soto MD

## 2025-01-08 ENCOUNTER — OFFICE VISIT (OUTPATIENT)
Dept: DERMATOLOGY | Facility: CLINIC | Age: 60
End: 2025-01-08
Payer: COMMERCIAL

## 2025-01-08 DIAGNOSIS — Z85.828 HISTORY OF SKIN CANCER: Primary | ICD-10-CM

## 2025-01-08 PROCEDURE — 99212 OFFICE O/P EST SF 10 MIN: CPT | Performed by: DERMATOLOGY

## 2025-01-08 ASSESSMENT — PAIN SCALES - GENERAL: PAINLEVEL_OUTOF10: NO PAIN (0)

## 2025-01-08 NOTE — NURSING NOTE
Kristen Bernstein's chief complaint for this visit includes:  Chief Complaint   Patient presents with    Derm Problem     Patient is here to follow-up on post mohs right cheek ipl treatment.     PCP: Evelin Soto    Referring Provider:  Referred Self, MD  No address on file    LMP 05/15/2015 (Exact Date)   No Pain (0)      No Known Allergies      Do you need any medication refills at today's visit? No    Nasrin Raya MA

## 2025-01-08 NOTE — LETTER
1/8/2025      Kristen Bernstein  [address] [place] [address]      Dear Colleague,    Thank you for referring your patient, Kristen Bernstein, to the [facility]. Please see a copy of my visit note below.    Kristen Bernstein is an extremely pleasant 59 year old year old female patient here today for hx of melanoma.  SCar healing well no issues.  Patient has no other skin complaints today.  Remainder of the HPI, Meds, PMH, Allergies, FH, and SH was reviewed in chart.      Past Medical History:   Diagnosis Date     GERD      Malignant melanoma (H)      Rash and other nonspecific skin eruption (aka PRURITIS)      Unspecified essential hypertension        Past Surgical History:   Procedure Laterality Date     COLONOSCOPY  10/12/2012    Procedure: COLONOSCOPY;  Colonoscopy, screening;  Surgeon: Kristen Alexander MD;  Location: [facility] OR     COLONOSCOPY N/A 1/22/2018    Procedure: COMBINED COLONOSCOPY, SINGLE OR MULTIPLE BIOPSY/POLYPECTOMY BY BIOPSY;;  Surgeon: Priscilla You MD;  Location:  GI     [facility]       [facility] COLONOSCOPY THRU STOMA, DIAGNOSTIC  2007    polyps removed.         Family History   Problem Relation Age of Onset     Breast Cancer Mother 78     Osteoporosis Mother      Diabetes Father      Hypertension Father      Colon Cancer Father      Hypertension Maternal Grandmother      Heart Failure Maternal Grandmother      Diabetes Maternal Uncle      Lung Cancer Maternal Uncle        Social History     Socioeconomic History     Marital status:      Spouse name: Not on file     Number of children: Not on file     Years of education: Not on file     Highest education level: Not on file   Occupational History     Occupation: [employer]     Employer: NONE    Tobacco Use     Smoking status: Never     Passive exposure: Never     Smokeless tobacco: Never   Vaping Use     Vaping status: Never Used   Substance and  Sexual Activity     Alcohol use: Yes     Alcohol/week: 0.0 standard drinks of alcohol     Comment: 1/week     Drug use: No     Sexual activity: Yes     Partners: Male   Other Topics Concern     Parent/sibling w/ CABG, MI or angioplasty before 65F 55M? No      Service No     Blood Transfusions No     Caffeine Concern No     Occupational Exposure No     Hobby Hazards No     Sleep Concern No     Stress Concern Yes     Comment: Related to life issues     Weight Concern No     Special Diet No     Back Care No     Exercise No     Bike Helmet Yes     Seat Belt Yes     Self-Exams No   Social History Narrative     Not on file     Social Drivers of Health     Financial Resource Strain: Low Risk  (6/4/2024)    Financial Resource Strain      Within the past 12 months, have you or your family members you live with been unable to get utilities (heat, electricity) when it was really needed?: No   Food Insecurity: Low Risk  (6/4/2024)    Food Insecurity      Within the past 12 months, did you worry that your food would run out before you got money to buy more?: No      Within the past 12 months, did the food you bought just not last and you didn t have money to get more?: No   Transportation Needs: Low Risk  (6/4/2024)    Transportation Needs      Within the past 12 months, has lack of transportation kept you from medical appointments, getting your medicines, non-medical meetings or appointments, work, or from getting things that you need?: No   Physical Activity: Unknown (6/4/2024)    Exercise Vital Sign      Days of Exercise per Week: 0 days      Minutes of Exercise per Session: Not on file   Stress: No Stress Concern Present (6/4/2024)    [place] of [employer]ational [employer] - Occupational Stress Questionnaire      Feeling of Stress : Not at all   Social Connections: Unknown (6/4/2024)    Social Connection and Isolation Panel [NHANES]      Frequency of Communication with Friends and Family: Not on file      Frequency  of Social Gatherings with Friends and Family: More than three times a week      Attends Confucianist Services: Not on file      Active Member of Clubs or Organizations: Not on file      Attends Club or Organization Meetings: Not on file      Marital Status: Not on file   Interpersonal Safety: Low Risk  (12/11/2024)    Interpersonal Safety      Do you feel physically and emotionally safe where you currently live?: Yes      Within the past 12 months, have you been hit, slapped, kicked or otherwise physically hurt by someone?: No      Within the past 12 months, have you been humiliated or emotionally abused in other ways by your partner or ex-partner?: No   Housing Stability: Low Risk  (6/4/2024)    Housing Stability      Do you have housing? : Yes      Are you worried about losing your housing?: No       Outpatient Encounter Medications as of 1/8/2025   Medication Sig Dispense Refill     fluocinonide (LIDEX) 0.05 % external solution Apply topically 2 times daily To affected areas on the scalp as needed 60 mL 3     ketoconazole (NIZORAL) 2 % external cream Apply topically 2 times daily To affected area on right eyebrow or folded areas until clear. Mix with equal parts triam 0.025% 15 g 1     ketoconazole (NIZORAL) 2 % external shampoo Apply topically daily as needed for itching or irritation. Leave on for 3-5 min before rinsing. 120 mL 11     losartan (COZAAR) 100 MG tablet TAKE ONE TABLET BY MOUTH ONCE DAILY 90 tablet 3     triamcinolone (KENALOG) 0.025 % cream Apply topically 2 times daily To affected area on the right eyebrow or folded areas, until clear. Mix with equal parts ketoconazole 15 g 1     No facility-administered encounter medications on file as of 1/8/2025.             O:   NAD, WDWN, Alert & Oriented, Mood & Affect wnl, Vitals stable   General appearance normal   Vitals stable   Alert, oriented and in no acute distress      Following lymph nodes palpated: Occipital, Cervical, Supraclavicular no lad  R  cheek healing well with some erythema      Eyes: Conjunctivae/lids:Normal     ENT: Lips, mucosa: normal    MSK:Normal    Cardiovascular: peripheral edema none    Pulm: Breathing Normal    Neuro/Psych: Orientation:Alert and Orientedx3 ; Mood/Affect:normal       A/P:  Hx of melanoma, scar healing well  Recheck in 3 months for possible ipl   It was a pleasure speaking to Kristen Bernstein today.  Previous clinic notes and pertinent laboratory tests were reviewed prior to Kristen Bernstein's visit.      Again, thank you for allowing me to participate in the care of your patient.        Sincerely,        Neil Farah MD    Electronically signed

## 2025-01-08 NOTE — PROGRESS NOTES
Kristen Bernstein is an extremely pleasant 59 year old year old female patient here today for hx of melanoma.  SCar healing well no issues.  Patient has no other skin complaints today.  Remainder of the HPI, Meds, PMH, Allergies, FH, and SH was reviewed in chart.      Past Medical History:   Diagnosis Date    GERD     Malignant melanoma (H)     Rash and other nonspecific skin eruption (aka PRURITIS)     Unspecified essential hypertension        Past Surgical History:   Procedure Laterality Date    COLONOSCOPY  10/12/2012    Procedure: COLONOSCOPY;  Colonoscopy, screening;  Surgeon: Kristen Alexander MD;  Location: [facility] OR    COLONOSCOPY N/A 1/22/2018    Procedure: COMBINED COLONOSCOPY, SINGLE OR MULTIPLE BIOPSY/POLYPECTOMY BY BIOPSY;;  Surgeon: Priscilla You MD;  Location:  GI    [facility]      [facility] COLONOS[facility] THRU STOMA, DIAGNOSTIC  2007    polyps removed.         Family History   Problem Relation Age of Onset    Breast Cancer Mother 78    Osteoporosis Mother     Diabetes Father     Hypertension Father     Colon Cancer Father     Hypertension Maternal Grandmother     Heart Failure Maternal Grandmother     Diabetes Maternal Uncle     Lung Cancer Maternal Uncle        Social History     Socioeconomic History    Marital status:      Spouse name: Not on file    Number of children: Not on file    Years of education: Not on file    Highest education level: Not on file   Occupational History    Occupation: [employer]     Employer: NONE    Tobacco Use    Smoking status: Never     Passive exposure: Never    Smokeless tobacco: Never   Vaping Use    Vaping status: Never Used   Substance and Sexual Activity    Alcohol use: Yes     Alcohol/week: 0.0 standard drinks of alcohol     Comment: 1/week    Drug use: No    Sexual activity: Yes     Partners: Male   Other Topics Concern    Parent/sibling w/ CABG, MI or angioplasty before 65F 55M? No      Service No    Blood Transfusions No    Caffeine Concern No    Occupational Exposure No    Hobby Hazards No    Sleep Concern No    Stress Concern Yes     Comment: Related to life issues    Weight Concern No    Special Diet No    Back Care No    Exercise No    Bike Helmet Yes    Seat Belt Yes    Self-Exams No   Social History Narrative    Not on file     Social Drivers of Health     Financial Resource Strain: Low Risk  (6/4/2024)    Financial Resource Strain     Within the past 12 months, have you or your family members you live with been unable to get utilities (heat, electricity) when it was really needed?: No   Food Insecurity: Low Risk  (6/4/2024)    Food Insecurity     Within the past 12 months, did you worry that your food would run out before you got money to buy more?: No     Within the past 12 months, did the food you bought just not last and you didn t have money to get more?: No   Transportation Needs: Low Risk  (6/4/2024)    Transportation Needs     Within the past 12 months, has lack of transportation kept you from medical appointments, getting your medicines, non-medical meetings or appointments, work, or from getting things that you need?: No   Physical Activity: Unknown (6/4/2024)    Exercise Vital Sign     Days of Exercise per Week: 0 days     Minutes of Exercise per Session: Not on file   Stress: No Stress Concern Present (6/4/2024)    [place] of Occupational Health - Occupational Stress Questionnaire     Feeling of Stress : Not at all   Social Connections: Unknown (6/4/2024)    Social Connection and Isolation Panel [NHANES]     Frequency of Communication with Friends and Family: Not on file     Frequency of Social Gatherings with Friends and Family: More than three times a week     Attends Spiritism Services: Not on file     Active Member of Clubs or Organizations: Not on file     Attends Club or Organization Meetings: Not on file     Marital Status: Not on file   Interpersonal  Safety: Low Risk  (12/11/2024)    Interpersonal Safety     Do you feel physically and emotionally safe where you currently live?: Yes     Within the past 12 months, have you been hit, slapped, kicked or otherwise physically hurt by someone?: No     Within the past 12 months, have you been humiliated or emotionally abused in other ways by your partner or ex-partner?: No   Housing Stability: Low Risk  (6/4/2024)    Housing Stability     Do you have housing? : Yes     Are you worried about losing your housing?: No       Outpatient Encounter Medications as of 1/8/2025   Medication Sig Dispense Refill    fluocinonide (LIDEX) 0.05 % external solution Apply topically 2 times daily To affected areas on the scalp as needed 60 mL 3    ketoconazole (NIZORAL) 2 % external cream Apply topically 2 times daily To affected area on right eyebrow or folded areas until clear. Mix with equal parts triam 0.025% 15 g 1    ketoconazole (NIZORAL) 2 % external shampoo Apply topically daily as needed for itching or irritation. Leave on for 3-5 min before rinsing. 120 mL 11    losartan (COZAAR) 100 MG tablet TAKE ONE TABLET BY MOUTH ONCE DAILY 90 tablet 3    triamcinolone (KENALOG) 0.025 % cream Apply topically 2 times daily To affected area on the right eyebrow or folded areas, until clear. Mix with equal parts ketoconazole 15 g 1     No facility-administered encounter medications on file as of 1/8/2025.             O:   NAD, WDWN, Alert & Oriented, Mood & Affect wnl, Vitals stable   General appearance normal   Vitals stable   Alert, oriented and in no acute distress      Following lymph nodes palpated: Occipital, Cervical, Supraclavicular no lad  R cheek healing well with some erythema      Eyes: Conjunctivae/lids:Normal     ENT: Lips, mucosa: normal    MSK:Normal    Cardiovascular: peripheral edema none    Pulm: Breathing Normal    Neuro/Psych: Orientation:Alert and Orientedx3 ; Mood/Affect:normal       A/P:  Hx of melanoma, scar healing  well  Recheck in 3 months for possible ipl   It was a pleasure speaking to Kristen Bernstein today.  Previous clinic notes and pertinent laboratory tests were reviewed prior to Kristen Bernstein's visit.

## 2025-04-29 ENCOUNTER — OFFICE VISIT (OUTPATIENT)
Dept: DERMATOLOGY | Facility: CLINIC | Age: 60
End: 2025-04-29
Payer: COMMERCIAL

## 2025-04-29 DIAGNOSIS — L82.1 SEBORRHEIC KERATOSES: ICD-10-CM

## 2025-04-29 DIAGNOSIS — Z86.018 HISTORY OF DYSPLASTIC NEVUS: ICD-10-CM

## 2025-04-29 DIAGNOSIS — L21.9 SEBORRHEIC DERMATITIS: ICD-10-CM

## 2025-04-29 DIAGNOSIS — D18.01 CHERRY ANGIOMA: ICD-10-CM

## 2025-04-29 DIAGNOSIS — D22.9 MULTIPLE BENIGN NEVI: ICD-10-CM

## 2025-04-29 DIAGNOSIS — L30.4 INTERTRIGO: ICD-10-CM

## 2025-04-29 DIAGNOSIS — L81.4 SOLAR LENTIGO: ICD-10-CM

## 2025-04-29 DIAGNOSIS — Z12.83 SKIN CANCER SCREENING: ICD-10-CM

## 2025-04-29 DIAGNOSIS — Z85.820 HISTORY OF MELANOMA: Primary | ICD-10-CM

## 2025-04-29 RX ORDER — TRIAMCINOLONE ACETONIDE 0.25 MG/G
CREAM TOPICAL 2 TIMES DAILY
Qty: 15 G | Refills: 1 | Status: SHIPPED | OUTPATIENT
Start: 2025-04-29

## 2025-04-29 RX ORDER — KETOCONAZOLE 20 MG/G
CREAM TOPICAL 2 TIMES DAILY
Qty: 15 G | Refills: 1 | Status: SHIPPED | OUTPATIENT
Start: 2025-04-29

## 2025-04-29 NOTE — PATIENT INSTRUCTIONS

## 2025-04-29 NOTE — LETTER
4/29/2025      Kristen Bernstein  [address] [place] [address]      Dear Colleague,    Thank you for referring your patient, Kristen Bernstein, to the [facility] [place]. Please see a copy of my visit note below.    [facility] Note  Encounter Date: Apr 29, 2025  Office Visit     Dermatology Problem List:  Last FBSE 04/29/2025, recommend every 6 months  1. History of melanoma  - R lower cheek (monitor photos 4/18/2023; stable 10/17/2023), s/p partial shave bx 04/30/2024 revealed MIS s/p Mohs w/ WLE by Dr. Farah 5/3/24 clear margins  - Early melanocytic proliferation (treated as MIS), L medial distal thigh s/p WLE 1/21/2019  2. History of dysplastic nevus  - Mild atypia, L leg s/p shave biopsy 1/3/2019  3. Seborrheic dermatitis  - Current tx: fluocinonide 0.05% solution, ketoconazole 2% cream, triamcinolone 0.025% cream  - Previous tx: ketoconazole 2% shampoo, betamethasone 0.05% cream  4. Solar lentigo, L lower leg s/p shave biopsy 4/12/2022  5. 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    ____________________________________________    Assessment & Plan:    # Seborrheic dermatitis. Chronic. Improved, but still flares occassionally.  - continue ketoconazole 2% shampoo at least three times weekly. Lather in the shower, wait 5-10 minutes before rinsing.   - Continue fluocinonide 0.05% solution BID PRN to affected areas on the scalp. Try to use sparingly for flares.   - Continue ketoconazole 2% cream mixed 1:1 triamcinolone 0.025% cream BID PRN on the face prn.     # intertrigo, chronic, not present currently (well controlled)  - Continue ketoconazole 2% cream mixed 1:1 triamcinolone 0.025% cream BID PRN on the intertriginous areas prn.     # History of melanoma. No evidence of recurrent disease.  # History of dysplastic nevus.  - ABCDEs of melanoma advised.  - Continue photoprotection.  -  Continue skin exams twice yearly.  - Advised to monitor for changing, non-healing, bleeding, painful, changing, or otherwise symptomatic lesions.     # Skin cancer screening with multiple benign nevi, solar lentigines  - ABCDEs: Counseled ABCDEs of melanoma: Asymmetry, Border (irregularity), Color (not uniform, changes in color), Diameter (greater than 6 mm which is about the size of a pencil eraser), and Evolving (any changes in preexisting moles).  - Sun protection: Counseled SPF30+ sunscreen, UPF clothing, sun avoidance, tanning bed avoidance.    # Cherry angiomas and seborrheic keratoses,  Benign, reassurance given.       Follow-up: 6 month(s) in-person, or earlier for new or changing lesions    Staff and Scribe:   Scribe Disclosure:   By signing my name below, I, Freda Yarbrough, attest that this documentation has been prepared under the direction and in the presence of Saskia Oliveros PA-C.  - Electronically Signed: Freda Yarbrough 04/29/25       Provider Disclosure:  I agree with above History, Review of Systems, Physical exam and Plan.  I have reviewed the content of the documentation and have edited it as needed. I have personally performed the services documented here and the documentation accurately represents those services and the decisions I have made.      Electronically signed by:  All risks, benefits and alternatives were discussed with patient.  Patient is in agreement and understands the assessment and plan.  All questions were answered.  Sun Screen Education was given.   Return to Clinic in 6 months or sooner as needed.   Saskia Oliveros PA-C          ____________________________________________    CC: Derm Problem (6 month f/unit(s) [facility] [place] hx of melanoma )    HPI:  Ms. Kristen Bernstein is a(n) 59 year old female who presents today as a return patient for a skin check.     Patient reports her scalp is stable. She notes some itching and dryness on her back as she cannot reach it. She occasionally  and intermittently uses the shampoo and solution when symptoms occur. She has not needed to use the cream recently, but reports it is effective when she uses it. She will occasionally use aquaphor for itching, and notes it is effective.    Patient is otherwise feeling well, without additional skin concerns.    Labs Reviewed:  None.    Physical exam:  Vitals: LMP 05/15/2015 (Exact Date)   GEN: This is a well developed, well-nourished female in no acute distress, in a pleasant mood.    SKIN: Full skin, which includes the head/face, both arms, chest, back, abdomen,both legs, genitalia and/or groin buttocks, digits and/or nails, was examined.  - no significant scale on scalp  - erythema on R eyebrow or intertriginous areas  - linear scar on R cheek with some thickening but no erythema  - There is no lymphadenopathy on palpation of cervical, post auricular, occipital, axillary, inguinal, and popliteal nodes.  - There are dome shaped bright red papules on the head/neck, trunk, extremities.   - Multiple regular brown pigmented macules and papules are identified on the head/neck, trunk, extremities.   - Scattered brown macules on sun exposed areas.  - There are waxy stuck on tan to brown papules on the head/neck, trunk, extremities.  - No other lesions of concern on areas examined.         Medications:  Current Outpatient Medications   Medication Sig Dispense Refill     fluocinonide (LIDEX) 0.05 % external solution Apply topically 2 times daily To affected areas on the scalp as needed 60 mL 3     ketoconazole (NIZORAL) 2 % external cream Apply topically 2 times daily To affected area on right eyebrow or folded areas until clear. Mix with equal parts triam 0.025% 15 g 1     ketoconazole (NIZORAL) 2 % external shampoo Apply topically daily as needed for itching or irritation. Leave on for 3-5 min before rinsing. 120 mL 11     losartan (COZAAR) 100 MG tablet TAKE ONE TABLET BY MOUTH ONCE DAILY 90 tablet 3     triamcinolone  (KENALOG) 0.025 % cream Apply topically 2 times daily To affected area on the right eyebrow or folded areas, until clear. Mix with equal parts ketoconazole 15 g 1     No current facility-administered medications for this visit.      Past Medical History:   Patient Active Problem List   Diagnosis     Hypertension goal BP (blood pressure) < 140/90     Family history of colon cancer     GERD (gastroesophageal reflux disease)     CARDIOVASCULAR SCREENING; LDL GOAL LESS THAN 130     History of melanoma in situ - atypical melanocytic proliferation best treated as an early evolving melanoma in situ     Past Medical History:   Diagnosis Date     GERD      Malignant melanoma (H)      Rash and other nonspecific skin eruption (aka PRURITIS)      Unspecified essential hypertension         CC No referring provider defined for this encounter. on close of this encounter.      Again, thank you for allowing me to participate in the care of your patient.        Sincerely,        Saskia Oliveros PA-C    Electronically signed

## 2025-04-29 NOTE — PROGRESS NOTES
[facility] Note  Encounter Date: Apr 29, 2025  Office Visit     Dermatology Problem List:  Last FBSE 04/29/2025, recommend every 6 months  1. History of melanoma  - R lower cheek (monitor photos 4/18/2023; stable 10/17/2023), s/p partial shave bx 04/30/2024 revealed MIS s/p Mohs w/ WLE by Dr. Farah 5/3/24 clear margins  - Early melanocytic proliferation (treated as MIS), L medial distal thigh s/p WLE 1/21/2019  2. History of dysplastic nevus  - Mild atypia, L leg s/p shave biopsy 1/3/2019  3. Seborrheic dermatitis  - Current tx: fluocinonide 0.05% solution, ketoconazole 2% cream, triamcinolone 0.025% cream  - Previous tx: ketoconazole 2% shampoo, betamethasone 0.05% cream  4. Solar lentigo, L lower leg s/p shave biopsy 4/12/2022  5. 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    ____________________________________________    Assessment & Plan:    # Seborrheic dermatitis. Chronic. Improved, but still flares occassionally.  - continue ketoconazole 2% shampoo at least three times weekly. Lather in the shower, wait 5-10 minutes before rinsing.   - Continue fluocinonide 0.05% solution BID PRN to affected areas on the scalp. Try to use sparingly for flares.   - Continue ketoconazole 2% cream mixed 1:1 triamcinolone 0.025% cream BID PRN on the face prn.     # intertrigo, chronic, not present currently (well controlled)  - Continue ketoconazole 2% cream mixed 1:1 triamcinolone 0.025% cream BID PRN on the intertriginous areas prn.     # History of melanoma. No evidence of recurrent disease.  # History of dysplastic nevus.  - ABCDEs of melanoma advised.  - Continue photoprotection.  - Continue skin exams twice yearly.  - Advised to monitor for changing, non-healing, bleeding, painful, changing, or otherwise symptomatic lesions.     # Skin cancer screening with multiple benign nevi, solar lentigines  - ABCDEs: Counseled  ABCDEs of melanoma: Asymmetry, Border (irregularity), Color (not uniform, changes in color), Diameter (greater than 6 mm which is about the size of a pencil eraser), and Evolving (any changes in preexisting moles).  - Sun protection: Counseled SPF30+ sunscreen, UPF clothing, sun avoidance, tanning bed avoidance.    # Cherry angiomas and seborrheic keratoses,  Benign, reassurance given.       Follow-up: 6 month(s) in-person, or earlier for new or changing lesions    Staff and Scribe:   Scribe Disclosure:   By signing my name below, I, Freda Zenon, attest that this documentation has been prepared under the direction and in the presence of Saskia Oliveros PA-C.  - Electronically Signed: Freda Yarbrough 04/29/25       Provider Disclosure:  I agree with above History, Review of Systems, Physical exam and Plan.  I have reviewed the content of the documentation and have edited it as needed. I have personally performed the services documented here and the documentation accurately represents those services and the decisions I have made.      Electronically signed by:  All risks, benefits and alternatives were discussed with patient.  Patient is in agreement and understands the assessment and plan.  All questions were answered.  Sun Screen Education was given.   Return to Clinic in 6 months or sooner as needed.   Saskia Oliveros PA-C          ____________________________________________    CC: Derm Problem (6 month f/unit(s) [facility] [place] hx of melanoma )    HPI:  Ms. Kristen Bernstein is a(n) 59 year old female who presents today as a return patient for a skin check.     Patient reports her scalp is stable. She notes some itching and dryness on her back as she cannot reach it. She occasionally and intermittently uses the shampoo and solution when symptoms occur. She has not needed to use the cream recently, but reports it is effective when she uses it. She will occasionally use aquaphor for itching, and notes it is  effective.    Patient is otherwise feeling well, without additional skin concerns.    Labs Reviewed:  None.    Physical exam:  Vitals: LMP 05/15/2015 (Exact Date)   GEN: This is a well developed, well-nourished female in no acute distress, in a pleasant mood.    SKIN: Full skin, which includes the head/face, both arms, chest, back, abdomen,both legs, genitalia and/or groin buttocks, digits and/or nails, was examined.  - no significant scale on scalp  - erythema on R eyebrow or intertriginous areas  - linear scar on R cheek with some thickening but no erythema  - There is no lymphadenopathy on palpation of cervical, post auricular, occipital, axillary, inguinal, and popliteal nodes.  - There are dome shaped bright red papules on the head/neck, trunk, extremities.   - Multiple regular brown pigmented macules and papules are identified on the head/neck, trunk, extremities.   - Scattered brown macules on sun exposed areas.  - There are waxy stuck on tan to brown papules on the head/neck, trunk, extremities.  - No other lesions of concern on areas examined.         Medications:  Current Outpatient Medications   Medication Sig Dispense Refill    fluocinonide (LIDEX) 0.05 % external solution Apply topically 2 times daily To affected areas on the scalp as needed 60 mL 3    ketoconazole (NIZORAL) 2 % external cream Apply topically 2 times daily To affected area on right eyebrow or folded areas until clear. Mix with equal parts triam 0.025% 15 g 1    ketoconazole (NIZORAL) 2 % external shampoo Apply topically daily as needed for itching or irritation. Leave on for 3-5 min before rinsing. 120 mL 11    losartan (COZAAR) 100 MG tablet TAKE ONE TABLET BY MOUTH ONCE DAILY 90 tablet 3    triamcinolone (KENALOG) 0.025 % cream Apply topically 2 times daily To affected area on the right eyebrow or folded areas, until clear. Mix with equal parts ketoconazole 15 g 1     No current facility-administered medications for this visit.       Past Medical History:   Patient Active Problem List   Diagnosis    Hypertension goal BP (blood pressure) < 140/90    Family history of colon cancer    GERD (gastroesophageal reflux disease)    CARDIOVASCULAR SCREENING; LDL GOAL LESS THAN 130    History of melanoma in situ - atypical melanocytic proliferation best treated as an early evolving melanoma in situ     Past Medical History:   Diagnosis Date    GERD     Malignant melanoma (H)     Rash and other nonspecific skin eruption (aka PRURITIS)     Unspecified essential hypertension         CC No referring provider defined for this encounter. on close of this encounter.

## 2025-06-01 DIAGNOSIS — I10 HYPERTENSION GOAL BP (BLOOD PRESSURE) < 140/90: ICD-10-CM

## 2025-06-02 RX ORDER — LOSARTAN POTASSIUM 100 MG/1
100 TABLET ORAL
Qty: 90 TABLET | Refills: 0 | Status: SHIPPED | OUTPATIENT
Start: 2025-06-02

## 2025-06-10 SDOH — HEALTH STABILITY: PHYSICAL HEALTH: ON AVERAGE, HOW MANY DAYS PER WEEK DO YOU ENGAGE IN MODERATE TO STRENUOUS EXERCISE (LIKE A BRISK WALK)?: 0 DAYS

## 2025-06-10 SDOH — HEALTH STABILITY: PHYSICAL HEALTH: ON AVERAGE, HOW MANY MINUTES DO YOU ENGAGE IN EXERCISE AT THIS LEVEL?: 0 MIN

## 2025-06-10 ASSESSMENT — SOCIAL DETERMINANTS OF HEALTH (SDOH): HOW OFTEN DO YOU GET TOGETHER WITH FRIENDS OR RELATIVES?: ONCE A WEEK

## 2025-06-11 ENCOUNTER — OFFICE VISIT (OUTPATIENT)
Dept: FAMILY MEDICINE | Facility: CLINIC | Age: 60
End: 2025-06-11
Payer: COMMERCIAL

## 2025-06-11 ENCOUNTER — RESULTS FOLLOW-UP (OUTPATIENT)
Dept: FAMILY MEDICINE | Facility: CLINIC | Age: 60
End: 2025-06-11

## 2025-06-11 VITALS
RESPIRATION RATE: 16 BRPM | OXYGEN SATURATION: 97 % | SYSTOLIC BLOOD PRESSURE: 136 MMHG | DIASTOLIC BLOOD PRESSURE: 82 MMHG | BODY MASS INDEX: 32.94 KG/M2 | HEIGHT: 62 IN | WEIGHT: 179 LBS | HEART RATE: 71 BPM | TEMPERATURE: 98.3 F

## 2025-06-11 DIAGNOSIS — Z78.0 ASYMPTOMATIC POSTMENOPAUSAL STATUS: ICD-10-CM

## 2025-06-11 DIAGNOSIS — Z00.00 ANNUAL PHYSICAL EXAM: Primary | ICD-10-CM

## 2025-06-11 DIAGNOSIS — I10 HYPERTENSION GOAL BP (BLOOD PRESSURE) < 140/90: ICD-10-CM

## 2025-06-11 LAB
ALBUMIN SERPL BCG-MCNC: 4.2 G/DL (ref 3.5–5.2)
ALP SERPL-CCNC: 118 U/L (ref 40–150)
ALT SERPL W P-5'-P-CCNC: 14 U/L (ref 0–50)
ANION GAP SERPL CALCULATED.3IONS-SCNC: 9 MMOL/L (ref 7–15)
AST SERPL W P-5'-P-CCNC: 22 U/L (ref 0–45)
BILIRUB SERPL-MCNC: 0.5 MG/DL
BUN SERPL-MCNC: 12.3 MG/DL (ref 8–23)
CALCIUM SERPL-MCNC: 9.7 MG/DL (ref 8.8–10.4)
CHLORIDE SERPL-SCNC: 102 MMOL/L (ref 98–107)
CHOLEST SERPL-MCNC: 173 MG/DL
CREAT SERPL-MCNC: 1.13 MG/DL (ref 0.51–0.95)
EGFRCR SERPLBLD CKD-EPI 2021: 55 ML/MIN/1.73M2
FASTING STATUS PATIENT QL REPORTED: YES
FASTING STATUS PATIENT QL REPORTED: YES
GLUCOSE SERPL-MCNC: 102 MG/DL (ref 70–99)
HCO3 SERPL-SCNC: 26 MMOL/L (ref 22–29)
HDLC SERPL-MCNC: 45 MG/DL
HGB BLD-MCNC: 13.9 G/DL (ref 11.7–15.7)
LDLC SERPL CALC-MCNC: 104 MG/DL
MCV RBC AUTO: 89 FL (ref 78–100)
NONHDLC SERPL-MCNC: 128 MG/DL
POTASSIUM SERPL-SCNC: 4.6 MMOL/L (ref 3.4–5.3)
PROT SERPL-MCNC: 7.2 G/DL (ref 6.4–8.3)
SODIUM SERPL-SCNC: 137 MMOL/L (ref 135–145)
TRIGL SERPL-MCNC: 122 MG/DL

## 2025-06-11 PROCEDURE — 36415 COLL VENOUS BLD VENIPUNCTURE: CPT | Performed by: FAMILY MEDICINE

## 2025-06-11 PROCEDURE — 85018 HEMOGLOBIN: CPT | Performed by: FAMILY MEDICINE

## 2025-06-11 PROCEDURE — 3049F LDL-C 100-129 MG/DL: CPT | Performed by: FAMILY MEDICINE

## 2025-06-11 PROCEDURE — G2211 COMPLEX E/M VISIT ADD ON: HCPCS | Performed by: FAMILY MEDICINE

## 2025-06-11 PROCEDURE — 80061 LIPID PANEL: CPT | Performed by: FAMILY MEDICINE

## 2025-06-11 PROCEDURE — 99213 OFFICE O/P EST LOW 20 MIN: CPT | Mod: 25 | Performed by: FAMILY MEDICINE

## 2025-06-11 PROCEDURE — 3075F SYST BP GE 130 - 139MM HG: CPT | Performed by: FAMILY MEDICINE

## 2025-06-11 PROCEDURE — 3079F DIAST BP 80-89 MM HG: CPT | Performed by: FAMILY MEDICINE

## 2025-06-11 PROCEDURE — 80053 COMPREHEN METABOLIC PANEL: CPT | Performed by: FAMILY MEDICINE

## 2025-06-11 PROCEDURE — 99396 PREV VISIT EST AGE 40-64: CPT | Performed by: FAMILY MEDICINE

## 2025-06-11 PROCEDURE — 1126F AMNT PAIN NOTED NONE PRSNT: CPT | Performed by: FAMILY MEDICINE

## 2025-06-11 RX ORDER — LOSARTAN POTASSIUM 100 MG/1
100 TABLET ORAL DAILY
Qty: 90 TABLET | Refills: 3 | Status: SHIPPED | OUTPATIENT
Start: 2025-06-11

## 2025-06-11 ASSESSMENT — PAIN SCALES - GENERAL: PAINLEVEL_OUTOF10: NO PAIN (0)

## 2025-06-11 NOTE — PROGRESS NOTES
"Preventive Care Visit  [facility] [place] MD, Family Medicine  Jun 11, 2025      Assessment & Plan     Annual physical exam    - Hemoglobin; Future  - Hemoglobin    Hypertension goal BP (blood pressure) < 140/90  Well managed  - losartan (COZAAR) 100 MG tablet; Take 1 tablet (100 mg) by mouth daily.  - Lipid panel reflex to direct LDL Fasting; Future  - Comprehensive metabolic panel; Future  - Lipid panel reflex to direct LDL Fasting  - Comprehensive metabolic panel    Asymptomatic postmenopausal status    - DX Bone Density; Future    The longitudinal plan of care for the diagnosis(es)/condition(s) as documented were addressed during this visit. Due to the added complexity in care, I will continue to support Kristen in the subsequent management and with ongoing continuity of care.          BMI  Estimated body mass index is 32.94 kg/m  as calculated from the following:    Height as of this encounter: 1.57 m (5' 1.81\").    Weight as of this encounter: 81.2 kg (179 lb).   Weight management plan: Discussed healthy diet and exercise guidelines        Subjective   Kristen is a 59 year old, presenting for the following:  Physical        6/11/2025     8:42 AM   Additional Questions   Roomed by Nasrin RICHARDS       Hypertension Follow-up    Do you check your blood pressure regularly outside of the clinic? No   Are you following a low salt diet? Yes  Are your blood pressures ever more than 140 on the top number (systolic) OR more   than 90 on the bottom number (diastolic), for example 140/90? N/A    BP Readings from Last 2 Encounters:   06/11/25 136/82   12/11/24 130/80       Advance Care Planning    Discussed advance care planning with patient; however, patient declined at this time.        6/10/2025   General Health   How would you rate your overall physical health? Good   Feel stress (tense, anxious, or unable to sleep) Not at all         6/10/2025   Nutrition   Three or more servings " of calcium each day? (!) NO   Diet: Regular (no restrictions)   How many servings of fruit and vegetables per day? (!) 0-1   How many sweetened beverages each day? 0-1         6/10/2025   Exercise   Days per week of moderate/strenous exercise 0 days   Average minutes spent exercising at this level 0 min   (!) EXERCISE CONCERN      6/10/2025   Social Factors   Frequency of gathering with friends or relatives Once a week   Worry food won't last until get money to buy more No   Food not last or not have enough money for food? No   Do you have housing? (Housing is defined as stable permanent housing and does not include staying outside in a car, in a tent, in an abandoned building, in an overnight shelter, or couch-surfing.) Yes   Are you worried about losing your housing? No   Lack of transportation? No   Unable to get utilities (heat,electricity)? No         6/10/2025   Fall Risk   Fallen 2 or more times in the past year? No   Trouble with walking or balance? No          6/10/2025   Dental   Dentist two times every year? Yes         Today's PHQ-2 Score:       6/10/2025     4:56 PM   PHQ-2 ( 1999 [employer])   Q1: Little interest or pleasure in doing things 0   Q2: Feeling down, depressed or hopeless 0   PHQ-2 Score 0    Q1: Little interest or pleasure in doing things Not at all   Q2: Feeling down, depressed or hopeless Not at all   PHQ-2 Score 0       Patient-reported           6/10/2025   Substance Use   Alcohol more than 3/day or more than 7/wk No   Do you use any other substances recreationally? No     Social History     Tobacco Use    Smoking status: Never     Passive exposure: Never    Smokeless tobacco: Never   Vaping Use    Vaping status: Never Used   Substance Use Topics    Alcohol use: Yes     Alcohol/week: 0.0 standard drinks of alcohol     Comment: 1/week    Drug use: No           10/16/2024   LAST FHS-7 RESULTS   1st degree relative breast or ovarian cancer Yes   Any relative bilateral breast cancer No   Any  male have breast cancer No   Any ONE woman have BOTH breast AND ovarian cancer No   Any woman with breast cancer before 50yrs No   2 or more relatives with breast AND/OR ovarian cancer No   2 or more relatives with breast AND/OR bowel cancer Yes        Mammogram Screening - Mammogram every 1-2 years updated in Health Maintenance based on mutual decision making        6/10/2025   STI Screening   New sexual partner(s) since last STI/HIV test? No     History of abnormal Pap smear: No - age 30- 64 PAP with HPV every 5 years recommended        Latest Ref Rng & Units 1/8/2021     1:23 PM 1/8/2021     1:22 PM 11/12/2015     8:48 AM   PAP / HPV   PAP (Historical)   NIL     HPV 16 DNA NEG^Negative Negative   Negative    HPV 18 DNA NEG^Negative Negative   Negative    Other HR HPV NEG^Negative Negative   Negative      ASCVD Risk   The 10-year ASCVD risk score (Neema THAO, et al., 2019) is: 5.1%    Values used to calculate the score:      Age: 59 years      Sex: Female      Is Non- : No      Diabetic: No      Tobacco smoker: No      Systolic Blood Pressure: 136 mmHg      Is BP treated: Yes      HDL Cholesterol: 45 mg/dL      Total Cholesterol: 173 mg/dL           Reviewed and updated as needed this visit by Provider    Allergies                 Patient Active Problem List   Diagnosis    Hypertension goal BP (blood pressure) < 140/90    Family history of colon cancer    GERD (gastroesophageal reflux disease)    CARDIOVASCULAR SCREENING; LDL GOAL LESS THAN 130    History of melanoma in situ - atypical melanocytic proliferation best treated as an early evolving melanoma in situ     Past Surgical History:   Procedure Laterality Date    COLONOSCOPY  10/12/2012    Procedure: COLONOSCOPY;  Colonoscopy, screening;  Surgeon: Kristen Alexander MD;  Location: [facility] OR    COLONOSCOPY N/A 1/22/2018    Procedure: COMBINED COLONOSCOPY, SINGLE OR MULTIPLE BIOPSY/POLYPECTOMY BY BIOPSY;;  Surgeon: Priscilla You  MD Moreno;  Location:  GI    UPPER GI ENDOSCOPY  3/2006    [facility]    ZZC C-SEC ONLY,PREV C-SEC      [facility] COLONOSCOPY THRU STOMA, DIAGNOSTIC  2007    polyps removed.        Social History     Tobacco Use    Smoking status: Never     Passive exposure: Never    Smokeless tobacco: Never   Substance Use Topics    Alcohol use: Yes     Alcohol/week: 0.0 standard drinks of alcohol     Comment: 1/week     Family History   Problem Relation Age of Onset    Breast Cancer Mother 78    Osteoporosis Mother     Diabetes Father     Hypertension Father     Colon Cancer Father     Hypertension Maternal Grandmother     Heart Failure Maternal Grandmother     Diabetes Maternal Uncle     Lung Cancer Maternal Uncle          Current Outpatient Medications   Medication Sig Dispense Refill    fluocinonide (LIDEX) 0.05 % external solution Apply topically 2 times daily To affected areas on the scalp as needed 60 mL 3    ketoconazole (NIZORAL) 2 % external cream Apply topically 2 times daily. To affected area on right eyebrow or folded areas until clear. Mix with equal parts triam 0.025% 15 g 1    ketoconazole (NIZORAL) 2 % external shampoo Apply topically daily as needed for itching or irritation. Leave on for 3-5 min before rinsing. 120 mL 11    losartan (COZAAR) 100 MG tablet Take 1 tablet (100 mg) by mouth daily. 90 tablet 3    triamcinolone (KENALOG) 0.025 % cream Apply topically 2 times daily. To affected area on the right eyebrow or folded areas, until clear. Mix with equal parts ketoconazole 15 g 1     No Known Allergies      Review of Systems  CONSTITUTIONAL: NEGATIVE for fever, chills, change in weight  INTEGUMENTARY/SKIN: NEGATIVE for worrisome rashes, moles or lesions  EYES: NEGATIVE for vision changes or irritation  ENT/MOUTH: NEGATIVE for ear, mouth and throat problems  RESP: NEGATIVE for significant cough or SOB  BREAST: NEGATIVE for masses, tenderness or discharge  CV: NEGATIVE for chest pain, palpitations or  "peripheral edema  GI: NEGATIVE for nausea, abdominal pain, heartburn, or change in bowel habits  : NEGATIVE for frequency, dysuria, or hematuria  MUSCULOSKELETAL: NEGATIVE for significant arthralgias or myalgia  NEURO: NEGATIVE for weakness, dizziness or paresthesias  ENDOCRINE: NEGATIVE for temperature intolerance, skin/hair changes  HEME: NEGATIVE for bleeding problems  PSYCHIATRIC: NEGATIVE for changes in mood or affect     Objective    Exam  /82   Pulse 71   Temp 98.3  F (36.8  C)   Resp 16   Ht 1.57 m (5' 1.81\")   Wt 81.2 kg (179 lb)   LMP 05/15/2015 (Exact Date)   SpO2 97%   BMI 32.94 kg/m     Estimated body mass index is 32.94 kg/m  as calculated from the following:    Height as of this encounter: 1.57 m (5' 1.81\").    Weight as of this encounter: 81.2 kg (179 lb).    Physical Exam  GENERAL: alert and no distress  EYES: Eyes grossly normal to inspection, PERRL and conjunctivae and sclerae normal  HENT: ear canals and TM's normal, nose and mouth without ulcers or lesions  NECK: no adenopathy, no asymmetry, masses, or scars  RESP: lungs clear to auscultation - no rales, rhonchi or wheezes  BREAST: normal without masses, tenderness or nipple discharge and no palpable axillary masses or adenopathy  CV: regular rate and rhythm, normal S1 S2, no S3 or S4, no murmur, click or rub, no peripheral edema  ABDOMEN: soft, nontender, no hepatosplenomegaly, no masses and bowel sounds normal  MS: no gross musculoskeletal defects noted, no edema  SKIN: no suspicious lesions or rashes  NEURO: Normal strength and tone, mentation intact and speech normal  PSYCH: mentation appears normal, affect normal/bright        Signed Electronically by: Evelin Soto MD    "

## 2025-06-20 ENCOUNTER — HOSPITAL ENCOUNTER (OUTPATIENT)
Dept: BONE DENSITY | Facility: CLINIC | Age: 60
Discharge: HOME | End: 2025-06-20
Attending: FAMILY MEDICINE | Admitting: FAMILY MEDICINE
Payer: COMMERCIAL

## 2025-06-20 DIAGNOSIS — Z78.0 ASYMPTOMATIC POSTMENOPAUSAL STATUS: ICD-10-CM

## 2025-06-20 PROCEDURE — 77080 DXA BONE DENSITY AXIAL: CPT

## (undated) RX ORDER — FENTANYL CITRATE 50 UG/ML
INJECTION, SOLUTION INTRAMUSCULAR; INTRAVENOUS
Status: DISPENSED
Start: 2018-01-22